# Patient Record
Sex: MALE | Race: WHITE | NOT HISPANIC OR LATINO | Employment: UNEMPLOYED | URBAN - METROPOLITAN AREA
[De-identification: names, ages, dates, MRNs, and addresses within clinical notes are randomized per-mention and may not be internally consistent; named-entity substitution may affect disease eponyms.]

---

## 2017-04-24 RX ORDER — ARMODAFINIL 250 MG/1
TABLET ORAL
Qty: 30 TABLET | Refills: 5 | OUTPATIENT
Start: 2017-04-24 | End: 2017-10-23 | Stop reason: SDUPTHER

## 2017-06-21 ENCOUNTER — OFFICE VISIT (OUTPATIENT)
Dept: NEUROLOGY | Facility: CLINIC | Age: 62
End: 2017-06-21

## 2017-06-21 VITALS
WEIGHT: 210.8 LBS | HEIGHT: 71 IN | BODY MASS INDEX: 29.51 KG/M2 | OXYGEN SATURATION: 98 % | DIASTOLIC BLOOD PRESSURE: 66 MMHG | HEART RATE: 78 BPM | SYSTOLIC BLOOD PRESSURE: 112 MMHG

## 2017-06-21 DIAGNOSIS — F33.40 RECURRENT MAJOR DEPRESSIVE DISORDER, IN REMISSION (HCC): ICD-10-CM

## 2017-06-21 DIAGNOSIS — G20 PARKINSON'S DISEASE (HCC): Primary | ICD-10-CM

## 2017-06-21 DIAGNOSIS — G47.33 OBSTRUCTIVE SLEEP APNEA SYNDROME: ICD-10-CM

## 2017-06-21 PROCEDURE — 99214 OFFICE O/P EST MOD 30 MIN: CPT | Performed by: PSYCHIATRY & NEUROLOGY

## 2017-06-21 RX ORDER — LANOLIN ALCOHOL/MO/W.PET/CERES
1000 CREAM (GRAM) TOPICAL DAILY
COMMUNITY
End: 2023-01-25 | Stop reason: SDUPTHER

## 2017-06-21 RX ORDER — ENTACAPONE 200 MG/1
200 TABLET ORAL
Qty: 150 TABLET | Refills: 11 | Status: SHIPPED | OUTPATIENT
Start: 2017-06-21 | End: 2018-06-21

## 2017-06-21 NOTE — PROGRESS NOTES
Subjective:     Patient ID: Vadim Mckeon is a 61 y.o. male.    History of Present Illness     59 yo male with PD, APOLINAR and MDD returns in follow up.  Last visit on 12/14/16 renewed Nuvigil, Sinemet, Mirapex, Comtan, and Celexa.    PD    Aching pains in legs from knees down.  Wearing off a few hours prior to next dosage.  Taking 6 am, 11 am, 3 pm, 7 pm.  Left leg stiffness and tremors are worsening.  Tremors in extremities left greater than right slightly worsening.  Involuntary blinking of eyes.  Fatigue is worsening.  Tolerating medications without side effects.          APOLINAR    Excessively tired the next day.  CPAP on average 8 hours a night.  Nuvigil improves daytime alertness.  Increased REM behavior disorder    MDD    Mood is stable on Celexa.    The following portions of the patient's history were reviewed and updated as appropriate: allergies, current medications, past medical history, past surgical history and problem list.    Review of Systems   Constitutional: Positive for fatigue. Negative for activity change and unexpected weight change.   HENT: Negative for facial swelling, hearing loss, tinnitus, trouble swallowing and voice change.    Eyes: Negative for photophobia, pain and visual disturbance.   Respiratory: Negative for apnea and choking.    Gastrointestinal: Positive for constipation.   Endocrine: Negative for cold intolerance.   Genitourinary: Negative for difficulty urinating, frequency and urgency.   Musculoskeletal: Positive for gait problem and neck stiffness. Negative for back pain.   Allergic/Immunologic: Negative for immunocompromised state.   Neurological: Positive for tremors and light-headedness. Negative for dizziness, seizures, syncope, facial asymmetry and speech difficulty.   Hematological: Negative for adenopathy.   Psychiatric/Behavioral: Positive for decreased concentration and sleep disturbance. Negative for confusion and hallucinations. The patient is not nervous/anxious.   "       Objective:  Vitals:    06/21/17 1431   BP: 112/66   Pulse: 78   SpO2: 98%   Weight: 210 lb 12.8 oz (95.6 kg)   Height: 71\" (180.3 cm)       Neurologic Exam     Mental Status   Attention: normal. Concentration: normal.   Level of consciousness: alert  Knowledge: good and consistent with education.   Normal comprehension.     Cranial Nerves     CN II   Visual fields full to confrontation.   Visual acuity: normal  Right visual field deficit: none  Left visual field deficit: none     CN III, IV, VI   Nystagmus: none   Diplopia: none  Ophthalmoparesis: none  Upgaze: normal  Downgaze: normal  Conjugate gaze: present    CN V   Facial sensation intact.   Right corneal reflex: normal  Left corneal reflex: normal    CN VII   Right facial weakness: none  Left facial weakness: none    CN VIII   Hearing: intact    CN IX, X   Palate: symmetric  Right gag reflex: normal  Left gag reflex: normal    CN XI   Right sternocleidomastoid strength: normal  Left sternocleidomastoid strength: normal    CN XII   Tongue: not atrophic  Fasciculations: absent  Tongue deviation: none    Motor Exam   Muscle bulk: normal  Overall muscle tone: normal  Right arm tone: normal  Left arm tone: normal and cogwheel rigidity  Right leg tone: normal  Left leg tone: normal    Sensory Exam   Light touch normal.     Gait, Coordination, and Reflexes     Gait  Gait: shuffling    Tremor   Resting tremor: present  Intention tremor: absent  Action tremor: absent    Reflexes   Reflexes 2+ except as noted.       Physical Exam   Constitutional: He appears well-developed and well-nourished.   Nursing note and vitals reviewed.      Assessment/Plan:       Problems Addressed this Visit        Respiratory    Obstructive sleep apnea syndrome     Continue CPAP and Nuvigil            Nervous and Auditory    Parkinson's disease - Primary     Waxing and waning sx on increasing dosages of dopamine replacement    Increase Sinemet and Comtan to 5 times day   With Mirapex " ER 4.5 mg qday         Relevant Medications    carbidopa-levodopa (SINEMET)  MG per tablet    entacapone (COMTAN) 200 MG tablet    Other Relevant Orders    Ambulatory Referral to Neurosurgery       Other    Depression     Psychological condition is unchanged.  Continue current treatment regimen.  Psychological condition  will be reassessed at the next regular appointment.

## 2017-06-21 NOTE — ASSESSMENT & PLAN NOTE
Waxing and waning sx on increasing dosages of dopamine replacement    Increase Sinemet and Comtan to 5 times day   With Mirapex ER 4.5 mg qday

## 2017-06-26 RX ORDER — ERGOCALCIFEROL 1.25 MG/1
CAPSULE ORAL
Qty: 4 CAPSULE | Refills: 12 | Status: SHIPPED | OUTPATIENT
Start: 2017-06-26 | End: 2018-07-09 | Stop reason: SDUPTHER

## 2017-08-09 ENCOUNTER — HOSPITAL ENCOUNTER (OUTPATIENT)
Dept: GENERAL RADIOLOGY | Facility: HOSPITAL | Age: 62
Discharge: HOME | End: 2017-08-09
Admitting: INTERNAL MEDICINE

## 2017-08-09 ENCOUNTER — TRANSCRIBE ORDERS (OUTPATIENT)
Dept: ADMINISTRATIVE | Facility: HOSPITAL | Age: 62
End: 2017-08-09

## 2017-08-09 DIAGNOSIS — I50.9 CONGESTIVE HEART FAILURE, UNSPECIFIED: Primary | ICD-10-CM

## 2017-08-09 PROCEDURE — 71020 HC CHEST PA AND LATERAL: CPT

## 2017-10-23 RX ORDER — ARMODAFINIL 250 MG/1
TABLET ORAL
Qty: 30 TABLET | Refills: 3 | Status: SHIPPED | OUTPATIENT
Start: 2017-10-23 | End: 2018-02-23 | Stop reason: SDUPTHER

## 2017-10-24 ENCOUNTER — HOSPITAL ENCOUNTER (OUTPATIENT)
Dept: GENERAL RADIOLOGY | Age: 62
Discharge: HOME | End: 2017-10-24
Attending: NURSE PRACTITIONER | Admitting: NURSE PRACTITIONER

## 2017-10-24 DIAGNOSIS — R22.0 FACIAL SWELLING: ICD-10-CM

## 2017-12-20 ENCOUNTER — OFFICE VISIT (OUTPATIENT)
Dept: NEUROLOGY | Facility: CLINIC | Age: 62
End: 2017-12-20

## 2017-12-20 VITALS
HEART RATE: 72 BPM | BODY MASS INDEX: 30.49 KG/M2 | RESPIRATION RATE: 18 BRPM | WEIGHT: 213 LBS | HEIGHT: 70 IN | OXYGEN SATURATION: 98 % | SYSTOLIC BLOOD PRESSURE: 106 MMHG | DIASTOLIC BLOOD PRESSURE: 58 MMHG

## 2017-12-20 DIAGNOSIS — G20 PARKINSON'S DISEASE (HCC): Primary | ICD-10-CM

## 2017-12-20 DIAGNOSIS — F33.40 RECURRENT MAJOR DEPRESSIVE DISORDER, IN REMISSION (HCC): ICD-10-CM

## 2017-12-20 DIAGNOSIS — G47.33 OBSTRUCTIVE SLEEP APNEA SYNDROME: ICD-10-CM

## 2017-12-20 PROCEDURE — 99214 OFFICE O/P EST MOD 30 MIN: CPT | Performed by: PSYCHIATRY & NEUROLOGY

## 2017-12-20 NOTE — PROGRESS NOTES
Subjective:   Chief Complaint   Patient presents with   • Parkinson's Disease       Patient ID: Vadim Mckeon is a 62 y.o. male.    History of Present Illness     62 y.o.  male with PD, APOLINAR and MDD returns in follow up.  Last visit on 6/21/17 renewed Nuvigil, Sinemet, Mirapex, Comtan, and Celexa.    PD    Leg pain improved after surgery for varicose veins.  Going through evaluation for DBS.  Taking Stalevo po 6 times a day.  Fatigue has slightly worsened.  Changed to Stalevo by  neurology.     APOLINAR    Recent sleep study and had CPAP pressure increased.  Improved daytime alertness.    CPAP on average 8 hours a night.  Nuvigil improves daytime alertness.  Continued REM behavior disorder    MDD    Mood is stable on Celexa.    The following portions of the patient's history were reviewed and updated as appropriate: allergies, current medications, past medical history, past surgical history and problem list.    Review of Systems   Constitutional: Positive for fatigue. Negative for activity change and unexpected weight change.   HENT: Negative for facial swelling, hearing loss, tinnitus, trouble swallowing and voice change.    Eyes: Negative for photophobia, pain and visual disturbance.   Respiratory: Negative for apnea and choking.    Gastrointestinal: Positive for constipation.   Endocrine: Negative for cold intolerance.   Genitourinary: Negative for difficulty urinating, frequency and urgency.   Musculoskeletal: Positive for gait problem and neck stiffness. Negative for back pain.   Allergic/Immunologic: Negative for immunocompromised state.   Neurological: Positive for tremors and light-headedness. Negative for dizziness, seizures, syncope, facial asymmetry and speech difficulty.   Hematological: Negative for adenopathy.   Psychiatric/Behavioral: Positive for decreased concentration and sleep disturbance. Negative for confusion and hallucinations. The patient is not nervous/anxious.         Objective:  Vitals:  "   12/20/17 1417   BP: 106/58   BP Location: Right arm   Patient Position: Sitting   Cuff Size: Adult   Pulse: 72   Resp: 18   SpO2: 98%   Weight: 96.6 kg (213 lb)   Height: 177.8 cm (70\")       Neurologic Exam     Mental Status   Attention: normal. Concentration: normal.   Level of consciousness: alert  Knowledge: good and consistent with education.   Normal comprehension.     Cranial Nerves     CN II   Visual fields full to confrontation.   Visual acuity: normal  Right visual field deficit: none  Left visual field deficit: none     CN III, IV, VI   Nystagmus: none   Diplopia: none  Ophthalmoparesis: none  Upgaze: normal  Downgaze: normal  Conjugate gaze: present    CN V   Facial sensation intact.   Right corneal reflex: normal  Left corneal reflex: normal    CN VII   Right facial weakness: none  Left facial weakness: none    CN VIII   Hearing: intact    CN IX, X   Palate: symmetric  Right gag reflex: normal  Left gag reflex: normal    CN XI   Right sternocleidomastoid strength: normal  Left sternocleidomastoid strength: normal    CN XII   Tongue: not atrophic  Fasciculations: absent  Tongue deviation: none    Motor Exam   Muscle bulk: normal  Overall muscle tone: normal  Right arm tone: normal  Left arm tone: normal and cogwheel rigidity  Right leg tone: normal  Left leg tone: normal    Sensory Exam   Light touch normal.     Gait, Coordination, and Reflexes     Gait  Gait: shuffling    Tremor   Resting tremor: present  Intention tremor: absent  Action tremor: absent    Reflexes   Reflexes 2+ except as noted.       Physical Exam   Constitutional: He appears well-developed and well-nourished.   Nursing note and vitals reviewed.      Assessment/Plan:       Problems Addressed this Visit        Respiratory    Obstructive sleep apnea syndrome     Continue CPAP and Nuvigil             Nervous and Auditory    Parkinson's disease - Primary     Undergoing evaluation for DBS    Will follow up after implantation             " Other    Depression     Psychological condition is unchanged.  Continue current treatment regimen.  Psychological condition  will be reassessed at the next regular appointment.

## 2017-12-26 RX ORDER — PRAMIPEXOLE DIHYDROCHLORIDE 4.5 MG/1
TABLET, EXTENDED RELEASE ORAL
Qty: 30 TABLET | Refills: 11 | Status: SHIPPED | OUTPATIENT
Start: 2017-12-26 | End: 2022-04-27

## 2018-01-08 RX ORDER — CITALOPRAM 20 MG/1
TABLET ORAL
Qty: 30 TABLET | Refills: 11 | Status: SHIPPED | OUTPATIENT
Start: 2018-01-08 | End: 2022-04-27

## 2018-02-23 RX ORDER — ARMODAFINIL 250 MG/1
250 TABLET ORAL DAILY
Qty: 30 TABLET | Refills: 3 | Status: SHIPPED | OUTPATIENT
Start: 2018-02-23 | End: 2018-05-29 | Stop reason: SDUPTHER

## 2018-02-23 RX ORDER — ARMODAFINIL 250 MG/1
TABLET ORAL
Qty: 30 TABLET | Refills: 3 | Status: SHIPPED | OUTPATIENT
Start: 2018-02-23 | End: 2018-02-23 | Stop reason: SDUPTHER

## 2018-03-01 RX ORDER — ARMODAFINIL 250 MG/1
TABLET ORAL
Qty: 30 TABLET | Refills: 3 | OUTPATIENT
Start: 2018-03-01

## 2018-05-29 RX ORDER — ARMODAFINIL 250 MG/1
250 TABLET ORAL DAILY
Qty: 30 TABLET | Refills: 3 | OUTPATIENT
Start: 2018-05-29

## 2018-05-29 NOTE — TELEPHONE ENCOUNTER
RECEIVED A FAX REQUESTING A MED REFILL FOR THE PATIENT. PLEASE SIGN THE SCRIPT SO I CAN CALL IN THE MEDICATION FOR THE PATIENT. THANKS!!

## 2018-07-09 RX ORDER — ERGOCALCIFEROL 1.25 MG/1
CAPSULE ORAL
Qty: 4 CAPSULE | Refills: 12 | Status: SHIPPED | OUTPATIENT
Start: 2018-07-09

## 2018-12-04 ENCOUNTER — HOSPITAL ENCOUNTER (OUTPATIENT)
Facility: HOSPITAL | Age: 63
Discharge: HOME | End: 2018-12-04
Payer: MEDICARE

## 2018-12-04 PROCEDURE — 80053 COMPREHEN METABOLIC PANEL: CPT

## 2018-12-04 PROCEDURE — 82607 VITAMIN B-12: CPT

## 2018-12-04 PROCEDURE — 84153 ASSAY OF PSA TOTAL: CPT

## 2018-12-04 PROCEDURE — 80061 LIPID PANEL: CPT

## 2018-12-04 PROCEDURE — 85025 COMPLETE CBC W/AUTO DIFF WBC: CPT

## 2018-12-04 PROCEDURE — 82746 ASSAY OF FOLIC ACID SERUM: CPT

## 2018-12-04 PROCEDURE — 83036 HEMOGLOBIN GLYCOSYLATED A1C: CPT

## 2018-12-04 PROCEDURE — 36415 COLL VENOUS BLD VENIPUNCTURE: CPT

## 2018-12-05 LAB
A/G RATIO: 2 (ref 0.8–2)
ALBUMIN SERPL-MCNC: 4.5 G/DL (ref 3.4–4.8)
ALP BLD-CCNC: 72 U/L (ref 25–100)
ALT SERPL-CCNC: 9 U/L (ref 4–36)
ANION GAP SERPL CALCULATED.3IONS-SCNC: 11 MMOL/L (ref 3–16)
AST SERPL-CCNC: 27 U/L (ref 8–33)
BASOPHILS ABSOLUTE: 0.1 K/UL (ref 0–0.1)
BASOPHILS RELATIVE PERCENT: 0.8 %
BILIRUB SERPL-MCNC: 0.4 MG/DL (ref 0.3–1.2)
BUN BLDV-MCNC: 20 MG/DL (ref 6–20)
CALCIUM SERPL-MCNC: 9.8 MG/DL (ref 8.5–10.5)
CHLORIDE BLD-SCNC: 98 MMOL/L (ref 98–107)
CHOLESTEROL, TOTAL: 151 MG/DL (ref 0–200)
CO2: 27 MMOL/L (ref 20–30)
CREAT SERPL-MCNC: 1.2 MG/DL (ref 0.4–1.2)
EOSINOPHILS ABSOLUTE: 0.1 K/UL (ref 0–0.4)
EOSINOPHILS RELATIVE PERCENT: 1.3 %
FOLATE: 8.97 NG/ML
GFR AFRICAN AMERICAN: >59
GFR NON-AFRICAN AMERICAN: >59
GLOBULIN: 2.3 G/DL
GLUCOSE BLD-MCNC: 302 MG/DL (ref 74–106)
HBA1C MFR BLD: 10.1 %
HCT VFR BLD CALC: 40.4 % (ref 40–54)
HDLC SERPL-MCNC: 35 MG/DL (ref 40–60)
HEMOGLOBIN: 13.2 G/DL (ref 13–18)
IMMATURE GRANULOCYTES #: 0 K/UL
IMMATURE GRANULOCYTES %: 0.3 % (ref 0–5)
LDL CHOLESTEROL CALCULATED: 56 MG/DL
LYMPHOCYTES ABSOLUTE: 1.5 K/UL (ref 1.5–4)
LYMPHOCYTES RELATIVE PERCENT: 21.5 %
MCH RBC QN AUTO: 30.2 PG (ref 27–32)
MCHC RBC AUTO-ENTMCNC: 32.7 G/DL (ref 31–35)
MCV RBC AUTO: 92.4 FL (ref 80–100)
MONOCYTES ABSOLUTE: 0.5 K/UL (ref 0.2–0.8)
MONOCYTES RELATIVE PERCENT: 7.4 %
NEUTROPHILS ABSOLUTE: 4.9 K/UL (ref 2–7.5)
NEUTROPHILS RELATIVE PERCENT: 68.7 %
PDW BLD-RTO: 13.4 % (ref 11–16)
PLATELET # BLD: 166 K/UL (ref 150–400)
PMV BLD AUTO: 11.1 FL (ref 6–10)
POTASSIUM SERPL-SCNC: 4.2 MMOL/L (ref 3.4–5.1)
PROSTATE SPECIFIC ANTIGEN: 1.44 NG/ML (ref 0–4)
RBC # BLD: 4.37 M/UL (ref 4.5–6)
SODIUM BLD-SCNC: 136 MMOL/L (ref 136–145)
TOTAL PROTEIN: 6.8 G/DL (ref 6.4–8.3)
TRIGL SERPL-MCNC: 301 MG/DL (ref 0–249)
VITAMIN B-12: 935 PG/ML (ref 211–911)
VLDLC SERPL CALC-MCNC: 60 MG/DL
WBC # BLD: 7.1 K/UL (ref 4–11)

## 2019-02-11 ENCOUNTER — APPOINTMENT (OUTPATIENT)
Dept: GENERAL RADIOLOGY | Facility: HOSPITAL | Age: 64
End: 2019-02-11
Payer: MEDICARE

## 2019-02-11 ENCOUNTER — HOSPITAL ENCOUNTER (EMERGENCY)
Facility: HOSPITAL | Age: 64
Discharge: HOME | End: 2019-02-11
Attending: EMERGENCY MEDICINE
Payer: MEDICARE

## 2019-02-11 VITALS
WEIGHT: 212.13 LBS | HEART RATE: 70 BPM | SYSTOLIC BLOOD PRESSURE: 137 MMHG | BODY MASS INDEX: 30.37 KG/M2 | HEIGHT: 70 IN | OXYGEN SATURATION: 97 % | DIASTOLIC BLOOD PRESSURE: 76 MMHG | TEMPERATURE: 98.1 F | RESPIRATION RATE: 15 BRPM

## 2019-02-11 DIAGNOSIS — R53.81 MALAISE: ICD-10-CM

## 2019-02-11 DIAGNOSIS — R11.11 NON-INTRACTABLE VOMITING WITHOUT NAUSEA, UNSPECIFIED VOMITING TYPE: Primary | ICD-10-CM

## 2019-02-11 LAB
A/G RATIO: 1.7 (ref 0.8–2)
ALBUMIN SERPL-MCNC: 4.7 G/DL (ref 3.4–4.8)
ALP BLD-CCNC: 75 U/L (ref 25–100)
ALT SERPL-CCNC: 40 U/L (ref 4–36)
ANION GAP SERPL CALCULATED.3IONS-SCNC: 11 MMOL/L (ref 3–16)
AST SERPL-CCNC: 28 U/L (ref 8–33)
BASOPHILS ABSOLUTE: 0 K/UL (ref 0–0.1)
BASOPHILS RELATIVE PERCENT: 0.4 %
BILIRUB SERPL-MCNC: 0.6 MG/DL (ref 0.3–1.2)
BILIRUBIN URINE: NEGATIVE
BLOOD, URINE: NEGATIVE
BUN BLDV-MCNC: 19 MG/DL (ref 6–20)
CALCIUM SERPL-MCNC: 9.7 MG/DL (ref 8.5–10.5)
CHLORIDE BLD-SCNC: 97 MMOL/L (ref 98–107)
CLARITY: CLEAR
CO2: 28 MMOL/L (ref 20–30)
COLOR: YELLOW
CREAT SERPL-MCNC: 1.2 MG/DL (ref 0.4–1.2)
EOSINOPHILS ABSOLUTE: 0.1 K/UL (ref 0–0.4)
EOSINOPHILS RELATIVE PERCENT: 1 %
GFR AFRICAN AMERICAN: >59
GFR NON-AFRICAN AMERICAN: >59
GLOBULIN: 2.8 G/DL
GLUCOSE BLD-MCNC: 215 MG/DL (ref 74–106)
GLUCOSE URINE: 500 MG/DL
HCT VFR BLD CALC: 42.6 % (ref 40–54)
HEMOGLOBIN: 14.2 G/DL (ref 13–18)
IMMATURE GRANULOCYTES #: 0 K/UL
IMMATURE GRANULOCYTES %: 0.3 % (ref 0–5)
KETONES, URINE: NEGATIVE MG/DL
LACTIC ACID: 1.8 MMOL/L (ref 0.4–2)
LEUKOCYTE ESTERASE, URINE: NEGATIVE
LIPASE: 41 U/L (ref 5.6–51.3)
LYMPHOCYTES ABSOLUTE: 2 K/UL (ref 1.5–4)
LYMPHOCYTES RELATIVE PERCENT: 20.9 %
MCH RBC QN AUTO: 29.8 PG (ref 27–32)
MCHC RBC AUTO-ENTMCNC: 33.3 G/DL (ref 31–35)
MCV RBC AUTO: 89.5 FL (ref 80–100)
MICROSCOPIC EXAMINATION: ABNORMAL
MONOCYTES ABSOLUTE: 0.7 K/UL (ref 0.2–0.8)
MONOCYTES RELATIVE PERCENT: 7.4 %
NEUTROPHILS ABSOLUTE: 6.6 K/UL (ref 2–7.5)
NEUTROPHILS RELATIVE PERCENT: 70 %
NITRITE, URINE: NEGATIVE
PDW BLD-RTO: 13.6 % (ref 11–16)
PH UA: 6.5
PLATELET # BLD: 158 K/UL (ref 150–400)
PMV BLD AUTO: 11.1 FL (ref 6–10)
POTASSIUM SERPL-SCNC: 4.2 MMOL/L (ref 3.4–5.1)
PRO-BNP: 162 PG/ML (ref 0–1800)
PROTEIN UA: NEGATIVE MG/DL
RBC # BLD: 4.76 M/UL (ref 4.5–6)
SODIUM BLD-SCNC: 136 MMOL/L (ref 136–145)
SPECIFIC GRAVITY UA: 1.02
TOTAL PROTEIN: 7.5 G/DL (ref 6.4–8.3)
TROPONIN: <0.3 NG/ML
URINE REFLEX TO CULTURE: ABNORMAL
URINE TYPE: ABNORMAL
UROBILINOGEN, URINE: 1 E.U./DL
WBC # BLD: 9.4 K/UL (ref 4–11)

## 2019-02-11 PROCEDURE — 83880 ASSAY OF NATRIURETIC PEPTIDE: CPT

## 2019-02-11 PROCEDURE — 99284 EMERGENCY DEPT VISIT MOD MDM: CPT

## 2019-02-11 PROCEDURE — 85025 COMPLETE CBC W/AUTO DIFF WBC: CPT

## 2019-02-11 PROCEDURE — 83605 ASSAY OF LACTIC ACID: CPT

## 2019-02-11 PROCEDURE — 83690 ASSAY OF LIPASE: CPT

## 2019-02-11 PROCEDURE — 84484 ASSAY OF TROPONIN QUANT: CPT

## 2019-02-11 PROCEDURE — 96361 HYDRATE IV INFUSION ADD-ON: CPT

## 2019-02-11 PROCEDURE — 36415 COLL VENOUS BLD VENIPUNCTURE: CPT

## 2019-02-11 PROCEDURE — 80053 COMPREHEN METABOLIC PANEL: CPT

## 2019-02-11 PROCEDURE — 96374 THER/PROPH/DIAG INJ IV PUSH: CPT

## 2019-02-11 PROCEDURE — 2580000003 HC RX 258: Performed by: EMERGENCY MEDICINE

## 2019-02-11 PROCEDURE — 93005 ELECTROCARDIOGRAM TRACING: CPT

## 2019-02-11 PROCEDURE — 81003 URINALYSIS AUTO W/O SCOPE: CPT

## 2019-02-11 PROCEDURE — 6360000002 HC RX W HCPCS: Performed by: EMERGENCY MEDICINE

## 2019-02-11 PROCEDURE — 71045 X-RAY EXAM CHEST 1 VIEW: CPT

## 2019-02-11 RX ORDER — CITALOPRAM 20 MG/1
20 TABLET ORAL DAILY
COMMUNITY

## 2019-02-11 RX ORDER — PRAMIPEXOLE 4.5 MG/1
TABLET, EXTENDED RELEASE ORAL DAILY
COMMUNITY

## 2019-02-11 RX ORDER — DONEPEZIL HYDROCHLORIDE 10 MG/1
10 TABLET, FILM COATED ORAL NIGHTLY
COMMUNITY

## 2019-02-11 RX ORDER — GLIMEPIRIDE 4 MG/1
4 TABLET ORAL 2 TIMES DAILY
COMMUNITY

## 2019-02-11 RX ORDER — RANITIDINE 150 MG/1
150 TABLET ORAL 2 TIMES DAILY
COMMUNITY

## 2019-02-11 RX ORDER — 0.9 % SODIUM CHLORIDE 0.9 %
1000 INTRAVENOUS SOLUTION INTRAVENOUS ONCE
Status: COMPLETED | OUTPATIENT
Start: 2019-02-11 | End: 2019-02-11

## 2019-02-11 RX ORDER — LISINOPRIL 20 MG/1
20 TABLET ORAL DAILY
COMMUNITY

## 2019-02-11 RX ORDER — ROSUVASTATIN CALCIUM 20 MG/1
20 TABLET, COATED ORAL DAILY
COMMUNITY

## 2019-02-11 RX ORDER — ONDANSETRON 2 MG/ML
4 INJECTION INTRAMUSCULAR; INTRAVENOUS ONCE
Status: COMPLETED | OUTPATIENT
Start: 2019-02-11 | End: 2019-02-11

## 2019-02-11 RX ORDER — ONDANSETRON 4 MG/1
4 TABLET, ORALLY DISINTEGRATING ORAL EVERY 8 HOURS PRN
Qty: 12 TABLET | Refills: 0 | Status: SHIPPED | OUTPATIENT
Start: 2019-02-11

## 2019-02-11 RX ADMIN — ONDANSETRON 4 MG: 2 INJECTION INTRAMUSCULAR; INTRAVENOUS at 19:46

## 2019-02-11 RX ADMIN — SODIUM CHLORIDE 1000 ML: 9 INJECTION, SOLUTION INTRAVENOUS at 19:42

## 2019-10-09 ENCOUNTER — HOSPITAL ENCOUNTER (OUTPATIENT)
Facility: HOSPITAL | Age: 64
Discharge: HOME | End: 2019-10-09
Payer: MEDICARE

## 2019-10-09 PROCEDURE — 82306 VITAMIN D 25 HYDROXY: CPT

## 2019-10-09 PROCEDURE — 82607 VITAMIN B-12: CPT

## 2019-10-09 PROCEDURE — 80053 COMPREHEN METABOLIC PANEL: CPT

## 2019-10-09 PROCEDURE — 36415 COLL VENOUS BLD VENIPUNCTURE: CPT

## 2019-10-09 PROCEDURE — 82746 ASSAY OF FOLIC ACID SERUM: CPT

## 2019-10-09 PROCEDURE — 85025 COMPLETE CBC W/AUTO DIFF WBC: CPT

## 2019-10-09 PROCEDURE — 80061 LIPID PANEL: CPT

## 2019-10-09 PROCEDURE — 84443 ASSAY THYROID STIM HORMONE: CPT

## 2019-10-09 PROCEDURE — 83036 HEMOGLOBIN GLYCOSYLATED A1C: CPT

## 2019-10-10 LAB
A/G RATIO: 1.7 (ref 0.8–2)
ALBUMIN SERPL-MCNC: 4.3 G/DL (ref 3.4–4.8)
ALP BLD-CCNC: 74 U/L (ref 25–100)
ALT SERPL-CCNC: 31 U/L (ref 4–36)
ANION GAP SERPL CALCULATED.3IONS-SCNC: 14 MMOL/L (ref 3–16)
AST SERPL-CCNC: 18 U/L (ref 8–33)
BASOPHILS ABSOLUTE: 0 K/UL (ref 0–0.1)
BASOPHILS RELATIVE PERCENT: 0.5 %
BILIRUB SERPL-MCNC: 0.4 MG/DL (ref 0.3–1.2)
BUN BLDV-MCNC: 22 MG/DL (ref 6–20)
CALCIUM SERPL-MCNC: 9.3 MG/DL (ref 8.5–10.5)
CHLORIDE BLD-SCNC: 98 MMOL/L (ref 98–107)
CHOLESTEROL, TOTAL: 87 MG/DL (ref 0–200)
CO2: 27 MMOL/L (ref 20–30)
CREAT SERPL-MCNC: 1.2 MG/DL (ref 0.4–1.2)
EOSINOPHILS ABSOLUTE: 0.1 K/UL (ref 0–0.4)
EOSINOPHILS RELATIVE PERCENT: 1.1 %
FOLATE: 9.49 NG/ML
GFR AFRICAN AMERICAN: >59
GFR NON-AFRICAN AMERICAN: >59
GLOBULIN: 2.5 G/DL
GLUCOSE BLD-MCNC: 158 MG/DL (ref 74–106)
HBA1C MFR BLD: 8 %
HCT VFR BLD CALC: 38.4 % (ref 40–54)
HDLC SERPL-MCNC: 30 MG/DL (ref 40–60)
HEMOGLOBIN: 12.9 G/DL (ref 13–18)
IMMATURE GRANULOCYTES #: 0 K/UL
IMMATURE GRANULOCYTES %: 0.4 % (ref 0–5)
LDL CHOLESTEROL CALCULATED: 18 MG/DL
LYMPHOCYTES ABSOLUTE: 1.8 K/UL (ref 1.5–4)
LYMPHOCYTES RELATIVE PERCENT: 21.9 %
MCH RBC QN AUTO: 30.8 PG (ref 27–32)
MCHC RBC AUTO-ENTMCNC: 33.6 G/DL (ref 31–35)
MCV RBC AUTO: 91.6 FL (ref 80–100)
MONOCYTES ABSOLUTE: 0.6 K/UL (ref 0.2–0.8)
MONOCYTES RELATIVE PERCENT: 7.4 %
NEUTROPHILS ABSOLUTE: 5.6 K/UL (ref 2–7.5)
NEUTROPHILS RELATIVE PERCENT: 68.7 %
PDW BLD-RTO: 13.2 % (ref 11–16)
PLATELET # BLD: 155 K/UL (ref 150–400)
PMV BLD AUTO: 11.7 FL (ref 6–10)
POTASSIUM SERPL-SCNC: 4.3 MMOL/L (ref 3.4–5.1)
RBC # BLD: 4.19 M/UL (ref 4.5–6)
SODIUM BLD-SCNC: 139 MMOL/L (ref 136–145)
TOTAL PROTEIN: 6.8 G/DL (ref 6.4–8.3)
TRIGL SERPL-MCNC: 197 MG/DL (ref 0–249)
TSH SERPL DL<=0.05 MIU/L-ACNC: 1.07 UIU/ML (ref 0.35–5.5)
VITAMIN B-12: 393 PG/ML (ref 211–911)
VITAMIN D 25-HYDROXY: 42.9 (ref 32–100)
VLDLC SERPL CALC-MCNC: 39 MG/DL
WBC # BLD: 8.1 K/UL (ref 4–11)

## 2020-06-15 ENCOUNTER — HOSPITAL ENCOUNTER (OUTPATIENT)
Facility: HOSPITAL | Age: 65
Discharge: HOME | End: 2020-06-15
Payer: MEDICARE

## 2020-06-15 LAB
A/G RATIO: 1.8 (ref 0.8–2)
ALBUMIN SERPL-MCNC: 4.6 G/DL (ref 3.4–4.8)
ALP BLD-CCNC: 81 U/L (ref 25–100)
ALT SERPL-CCNC: 6 U/L (ref 4–36)
ANION GAP SERPL CALCULATED.3IONS-SCNC: 14 MMOL/L (ref 3–16)
AST SERPL-CCNC: 21 U/L (ref 8–33)
BASOPHILS ABSOLUTE: 0 K/UL (ref 0–0.1)
BASOPHILS RELATIVE PERCENT: 0.5 %
BILIRUB SERPL-MCNC: 0.7 MG/DL (ref 0.3–1.2)
BUN BLDV-MCNC: 18 MG/DL (ref 6–20)
CALCIUM SERPL-MCNC: 9.5 MG/DL (ref 8.5–10.5)
CHLORIDE BLD-SCNC: 99 MMOL/L (ref 98–107)
CHOLESTEROL, TOTAL: 99 MG/DL (ref 0–200)
CO2: 25 MMOL/L (ref 20–30)
CREAT SERPL-MCNC: 1.1 MG/DL (ref 0.4–1.2)
EOSINOPHILS ABSOLUTE: 0.1 K/UL (ref 0–0.4)
EOSINOPHILS RELATIVE PERCENT: 0.8 %
FOLATE: 9.48 NG/ML
GFR AFRICAN AMERICAN: >59
GFR NON-AFRICAN AMERICAN: >59
GLOBULIN: 2.6 G/DL
GLUCOSE BLD-MCNC: 162 MG/DL (ref 74–106)
HBA1C MFR BLD: 7.3 %
HCT VFR BLD CALC: 39.2 % (ref 40–54)
HDLC SERPL-MCNC: 30 MG/DL (ref 40–60)
HEMOGLOBIN: 13.3 G/DL (ref 13–18)
IMMATURE GRANULOCYTES #: 0 K/UL
IMMATURE GRANULOCYTES %: 0.5 % (ref 0–5)
LDL CHOLESTEROL CALCULATED: 34 MG/DL
LYMPHOCYTES ABSOLUTE: 1.2 K/UL (ref 1.5–4)
LYMPHOCYTES RELATIVE PERCENT: 19.6 %
MCH RBC QN AUTO: 31.2 PG (ref 27–32)
MCHC RBC AUTO-ENTMCNC: 33.9 G/DL (ref 31–35)
MCV RBC AUTO: 92 FL (ref 80–100)
MONOCYTES ABSOLUTE: 0.4 K/UL (ref 0.2–0.8)
MONOCYTES RELATIVE PERCENT: 7 %
NEUTROPHILS ABSOLUTE: 4.4 K/UL (ref 2–7.5)
NEUTROPHILS RELATIVE PERCENT: 71.6 %
PDW BLD-RTO: 13.2 % (ref 11–16)
PLATELET # BLD: 141 K/UL (ref 150–400)
PMV BLD AUTO: 11.1 FL (ref 6–10)
POTASSIUM SERPL-SCNC: 4.2 MMOL/L (ref 3.4–5.1)
RBC # BLD: 4.26 M/UL (ref 4.5–6)
SODIUM BLD-SCNC: 138 MMOL/L (ref 136–145)
TOTAL PROTEIN: 7.2 G/DL (ref 6.4–8.3)
TRIGL SERPL-MCNC: 177 MG/DL (ref 0–249)
TSH SERPL DL<=0.05 MIU/L-ACNC: 2.08 UIU/ML (ref 0.35–5.5)
VITAMIN B-12: 320 PG/ML (ref 211–911)
VITAMIN D 25-HYDROXY: 44.1 (ref 32–100)
VLDLC SERPL CALC-MCNC: 35 MG/DL
WBC # BLD: 6.1 K/UL (ref 4–11)

## 2020-06-15 PROCEDURE — 82607 VITAMIN B-12: CPT

## 2020-06-15 PROCEDURE — 36415 COLL VENOUS BLD VENIPUNCTURE: CPT

## 2020-06-15 PROCEDURE — 84443 ASSAY THYROID STIM HORMONE: CPT

## 2020-06-15 PROCEDURE — 80061 LIPID PANEL: CPT

## 2020-06-15 PROCEDURE — 82746 ASSAY OF FOLIC ACID SERUM: CPT

## 2020-06-15 PROCEDURE — 80053 COMPREHEN METABOLIC PANEL: CPT

## 2020-06-15 PROCEDURE — 83036 HEMOGLOBIN GLYCOSYLATED A1C: CPT

## 2020-06-15 PROCEDURE — 82306 VITAMIN D 25 HYDROXY: CPT

## 2020-06-15 PROCEDURE — 85025 COMPLETE CBC W/AUTO DIFF WBC: CPT

## 2020-08-31 ENCOUNTER — HOSPITAL ENCOUNTER (OUTPATIENT)
Facility: HOSPITAL | Age: 65
Discharge: HOME | End: 2020-08-31
Payer: MEDICARE

## 2020-08-31 ENCOUNTER — HOSPITAL ENCOUNTER (OUTPATIENT)
Dept: ULTRASOUND IMAGING | Facility: HOSPITAL | Age: 65
Discharge: HOME | End: 2020-08-31
Payer: MEDICARE

## 2020-08-31 PROCEDURE — 87177 OVA AND PARASITES SMEARS: CPT

## 2020-08-31 PROCEDURE — 87506 IADNA-DNA/RNA PROBE TQ 6-11: CPT

## 2020-08-31 PROCEDURE — 76705 ECHO EXAM OF ABDOMEN: CPT

## 2020-08-31 PROCEDURE — 87209 SMEAR COMPLEX STAIN: CPT

## 2020-09-04 LAB — INTERPRETATION: NEGATIVE

## 2020-09-10 LAB
CAMPYLOBACTER JEJUNI/COLI PCR: NOT DETECTED
CAMPYLOBACTER UPSALIENSIS: NOT DETECTED
E COLI SHIGELLA/ENTEROINVASIVE PCR: NOT DETECTED
SALMONELLA PCR: NOT DETECTED
SHIGA TOXIN I: NOT DETECTED
SHIGA TOXIN II: NOT DETECTED

## 2020-09-28 NOTE — PROGRESS NOTES
Patient: Vadim Mckeon    YOB: 1955    Date: 09/30/2020    Primary Care Provider: Zuleyma Blackburn APRN    Chief Complaint   Patient presents with   • Consult   • Diarrhea       SUBJECTIVE:    History of present illness:  I saw the patient in the office today as a consultation for evaluation and treatment of diarrhea. States this has been going on x1 month ago.  Patient had stool studies that were negative for infection.  No rectal bleeding or anemia.  Last colonoscopy over 10 years ago.  No history of polyps and no rectal bleeding.  No weight loss or nausea or vomiting.  No family history of Crohn's disease or ulcerative colitis.  Gallbladder ultrasound was negative for stones.    The following portions of the patient's history were reviewed and updated as appropriate: allergies, current medications, past family history, past medical history, past social history, past surgical history and problem list.    Review of Systems   Constitutional: Negative for chills, fever and unexpected weight change.   HENT: Negative for trouble swallowing and voice change.    Eyes: Negative for visual disturbance.   Respiratory: Negative for apnea, cough, chest tightness, shortness of breath and wheezing.    Cardiovascular: Negative for chest pain, palpitations and leg swelling.   Gastrointestinal: Positive for abdominal pain and diarrhea. Negative for abdominal distention, anal bleeding, blood in stool, constipation, nausea, rectal pain and vomiting.   Endocrine: Negative for cold intolerance and heat intolerance.   Genitourinary: Negative for difficulty urinating, dysuria, flank pain, scrotal swelling and testicular pain.   Musculoskeletal: Negative for back pain, gait problem and joint swelling.   Skin: Negative for color change, rash and wound.   Neurological: Negative for dizziness, syncope, speech difficulty, weakness, numbness and headaches.   Hematological: Negative for adenopathy. Does not bruise/bleed  easily.   Psychiatric/Behavioral: Negative for confusion. The patient is not nervous/anxious.        History:  Past Medical History:   Diagnosis Date   • Movement disorder    • APOLINAR (obstructive sleep apnea)        Past Surgical History:   Procedure Laterality Date   • KNEE ARTHROSCOPY W/ MENISCAL REPAIR         Family History   Problem Relation Age of Onset   • Diabetes Other    • Hypertension Other    • Other Other         myocardial infarction       Social History     Tobacco Use   • Smoking status: Never Smoker   • Smokeless tobacco: Never Used   Substance Use Topics   • Alcohol use: Yes     Comment: 2 drinks/month   • Drug use: No       Allergies:  No Known Allergies    Medications:    Current Outpatient Medications:   •  Armodafinil 250 MG tablet, Take 1 tablet by mouth Daily., Disp: 30 tablet, Rfl: 3  •  carbidopa-levodopa (SINEMET)  MG per tablet, , Disp: , Rfl:   •  citalopram (CeleXA) 20 MG tablet, Take 1 tablet by mouth Daily., Disp: 30 tablet, Rfl: 11  •  clonazePAM (KlonoPIN) 1 MG tablet, , Disp: , Rfl:   •  donepezil (ARICEPT) 10 MG tablet, Take 10 mg by mouth., Disp: , Rfl:   •  glimepiride (AMARYL) 4 MG tablet, Take 4 mg by mouth., Disp: , Rfl:   •  glucose blood (ACCU-CHEK JAMEY PLUS) test strip, Check blood sugar X 2/day - 3 days/week, Disp: 25 each, Rfl: 12  •  Insulin Glargine, 1 Unit Dial, (TOUJEO) 300 UNIT/ML solution pen-injector injection, Inject  under the skin into the appropriate area as directed., Disp: , Rfl:   •  lisinopril (PRINIVIL,ZESTRIL) 20 MG tablet, Take 20 mg by mouth., Disp: , Rfl:   •  lisinopril-hydrochlorothiazide (PRINZIDE,ZESTORETIC) 20-25 MG per tablet, Take 1 tablet by mouth daily., Disp: , Rfl:   •  metFORMIN (GLUCOPHAGE) 500 MG tablet, Take 500 mg by mouth 2 (Two) Times a Day With Meals., Disp: , Rfl:   •  MIRAPEX ER 4.5 MG tablet sustained-release 24 hour, TAKE 1 TABLET BY MOUTH ONCE DAILY, Disp: 30 tablet, Rfl: 11  •  Nuedexta 20-10 MG capsule capsule, , Disp: ,  "Rfl:   •  omeprazole (priLOSEC) 40 MG capsule, , Disp: , Rfl:   •  rosuvastatin (CRESTOR) 20 MG tablet, Take 20 mg by mouth., Disp: , Rfl:   •  TRUEplus Pen Needles 31G X 6 MM misc, USE ONE EVERY DAY, Disp: , Rfl:   •  bisacodyl (bisacodyl) 5 MG EC tablet, Take 2 at 3pm and 2 at 7pm the day prior to colonoscopy., Disp: 4 tablet, Rfl: 0  •  Omega-3 Fatty Acids (FISH OIL TRIPLE STRENGTH) 1400 MG capsule, Take 1 capsule by mouth daily., Disp: , Rfl:   •  polyethylene glycol (MIRALAX) 17 GM/SCOOP powder, Mix 238g powder with 64 oz of clear liquid. Starting at 5pm drink 80z every 10-15 minutes until consumed., Disp: 238 g, Rfl: 0  •  ranitidine (ZANTAC) 150 MG tablet, Take 150 mg by mouth 2 (two) times a day., Disp: , Rfl:   •  triamcinolone (KENALOG) 0.5 % cream, apply TO TO RASH AREA TWO TO THREE times daily, Disp: , Rfl:   •  vitamin B-12 (CYANOCOBALAMIN) 1000 MCG tablet, Take 1,000 mcg by mouth Daily., Disp: , Rfl:   •  vitamin D (ERGOCALCIFEROL) 43523 units capsule capsule, TAKE 1 CAPSULE BY MOUTH ONCE WEEKLY, Disp: 4 capsule, Rfl: 12  •  VITAMIN D, CHOLECALCIFEROL, PO, Take 1.25 mg by mouth., Disp: , Rfl:     OBJECTIVE:    Vital Signs:   Vitals:    09/30/20 0937   BP: 120/62   Pulse: 73   Resp: 18   Temp: 97.8 °F (36.6 °C)   SpO2: 97%   Weight: 100 kg (221 lb)   Height: 180.3 cm (71\")       Physical Exam:   General Appearance:    Alert, cooperative, in no acute distress   Head:    Normocephalic, without obvious abnormality, atraumatic   Eyes:            Lids and lashes normal, conjunctivae and sclerae normal, no   icterus, no pallor, corneas clear, PERRL   Ears:    Ears appear intact with no abnormalities noted   Throat:   No oral lesions, no thrush, oral mucosa moist   Neck:   No adenopathy, supple, trachea midline, no thyromegaly,  no JVD   Lungs:     Clear to auscultation,respirations regular, even and                  unlabored    Heart:    Regular rhythm and normal rate, normal S1 and S2, no            murmur "   Abdomen:     no masses, no organomegaly, soft and nontender.  No umbilical hernias or inguinal hernias present.  No abdominal wall masses.  No tenderness.   Extremities:   Moves all extremities well, no edema, no cyanosis, no             redness   Pulses:   Pulses palpable and equal bilaterally   Skin:   No bleeding, bruising or rash   Lymph nodes:   No palpable adenopathy   Neurologic:   Cranial nerves 2 - 12 grossly intact, sensation intact      Results Review:   I reviewed the patient's new clinical results.    Review of Systems was reviewed and confirmed as accurate as documented by the MA.    ASSESSMENT/PLAN:    1. Chronic diarrhea        I recommend a colonoscopy for further evaluation. The procedure was explained as well as the risks which include but are not limited to bleeding, infection, perforation, abdominal pain etc. The patient understands these risks and the procedure and wishes to proceed.  Patient placed on a low-fat diet and lactose-free diet in interim.  Recent gallbladder work-up was negative.    Electronically signed by Ras Anders MD  09/30/20 09:04 EDT

## 2020-09-30 ENCOUNTER — OFFICE VISIT (OUTPATIENT)
Dept: SURGERY | Facility: CLINIC | Age: 65
End: 2020-09-30

## 2020-09-30 ENCOUNTER — TELEPHONE (OUTPATIENT)
Dept: SURGERY | Facility: CLINIC | Age: 65
End: 2020-09-30

## 2020-09-30 VITALS
WEIGHT: 221 LBS | HEART RATE: 73 BPM | HEIGHT: 71 IN | SYSTOLIC BLOOD PRESSURE: 120 MMHG | OXYGEN SATURATION: 97 % | DIASTOLIC BLOOD PRESSURE: 62 MMHG | RESPIRATION RATE: 18 BRPM | TEMPERATURE: 97.8 F | BODY MASS INDEX: 30.94 KG/M2

## 2020-09-30 DIAGNOSIS — K52.9 CHRONIC DIARRHEA: Primary | ICD-10-CM

## 2020-09-30 DIAGNOSIS — Z01.818 PREOP TESTING: Primary | ICD-10-CM

## 2020-09-30 PROCEDURE — 99203 OFFICE O/P NEW LOW 30 MIN: CPT | Performed by: SURGERY

## 2020-09-30 RX ORDER — DEXTROMETHORPHAN HYDROBROMIDE AND QUINIDINE SULFATE 20; 10 MG/1; MG/1
CAPSULE, GELATIN COATED ORAL
COMMUNITY
Start: 2020-09-25

## 2020-09-30 RX ORDER — ROSUVASTATIN CALCIUM 20 MG/1
20 TABLET, COATED ORAL
COMMUNITY

## 2020-09-30 RX ORDER — LISINOPRIL 20 MG/1
20 TABLET ORAL
COMMUNITY
End: 2023-01-25 | Stop reason: DRUGHIGH

## 2020-09-30 RX ORDER — TRIAMCINOLONE ACETONIDE 5 MG/G
CREAM TOPICAL
COMMUNITY
Start: 2020-09-16

## 2020-09-30 RX ORDER — OMEPRAZOLE 40 MG/1
CAPSULE, DELAYED RELEASE ORAL
COMMUNITY
Start: 2020-09-28

## 2020-09-30 RX ORDER — PEN NEEDLE, DIABETIC 31 G X1/4"
NEEDLE, DISPOSABLE MISCELLANEOUS
COMMUNITY
Start: 2020-09-03 | End: 2023-01-25

## 2020-09-30 RX ORDER — DONEPEZIL HYDROCHLORIDE 10 MG/1
23 TABLET, FILM COATED ORAL
COMMUNITY
End: 2022-04-27

## 2020-09-30 RX ORDER — BISACODYL 5 MG
TABLET, DELAYED RELEASE (ENTERIC COATED) ORAL
Qty: 4 TABLET | Refills: 0 | Status: SHIPPED | OUTPATIENT
Start: 2020-09-30 | End: 2023-01-25

## 2020-09-30 RX ORDER — CLONAZEPAM 1 MG/1
TABLET ORAL
COMMUNITY
Start: 2020-09-02

## 2020-09-30 RX ORDER — GLIMEPIRIDE 4 MG/1
4 TABLET ORAL
COMMUNITY
End: 2020-10-07 | Stop reason: SDUPTHER

## 2020-09-30 RX ORDER — POLYETHYLENE GLYCOL 3350 17 G/17G
POWDER, FOR SOLUTION ORAL
Qty: 238 G | Refills: 0 | Status: SHIPPED | OUTPATIENT
Start: 2020-09-30 | End: 2023-01-25

## 2020-10-05 ENCOUNTER — LAB (OUTPATIENT)
Dept: LAB | Facility: HOSPITAL | Age: 65
End: 2020-10-05

## 2020-10-05 DIAGNOSIS — Z01.818 PREOP TESTING: ICD-10-CM

## 2020-10-05 LAB — SARS-COV-2 RNA RESP QL NAA+PROBE: NOT DETECTED

## 2020-10-05 PROCEDURE — C9803 HOPD COVID-19 SPEC COLLECT: HCPCS

## 2020-10-05 PROCEDURE — U0004 COV-19 TEST NON-CDC HGH THRU: HCPCS

## 2020-10-06 ENCOUNTER — OUTSIDE FACILITY SERVICE (OUTPATIENT)
Dept: SURGERY | Facility: CLINIC | Age: 65
End: 2020-10-06

## 2020-10-06 PROCEDURE — 45384 COLONOSCOPY W/LESION REMOVAL: CPT | Performed by: SURGERY

## 2020-10-06 PROCEDURE — 45380 COLONOSCOPY AND BIOPSY: CPT | Performed by: SURGERY

## 2020-10-07 ENCOUNTER — OFFICE VISIT (OUTPATIENT)
Dept: ENDOCRINOLOGY | Facility: CLINIC | Age: 65
End: 2020-10-07

## 2020-10-07 VITALS
TEMPERATURE: 97.5 F | WEIGHT: 224.2 LBS | HEART RATE: 66 BPM | BODY MASS INDEX: 31.39 KG/M2 | HEIGHT: 71 IN | SYSTOLIC BLOOD PRESSURE: 128 MMHG | OXYGEN SATURATION: 96 % | DIASTOLIC BLOOD PRESSURE: 78 MMHG

## 2020-10-07 DIAGNOSIS — IMO0002 DIABETES MELLITUS TYPE 2, UNCONTROLLED, WITH COMPLICATIONS: Primary | ICD-10-CM

## 2020-10-07 LAB
EXPIRATION DATE: NORMAL
HBA1C MFR BLD: 9 %
Lab: NORMAL

## 2020-10-07 PROCEDURE — 99213 OFFICE O/P EST LOW 20 MIN: CPT | Performed by: INTERNAL MEDICINE

## 2020-10-07 PROCEDURE — 83036 HEMOGLOBIN GLYCOSYLATED A1C: CPT | Performed by: INTERNAL MEDICINE

## 2020-10-07 RX ORDER — GLIMEPIRIDE 4 MG/1
4 TABLET ORAL 2 TIMES DAILY
Qty: 60 TABLET | Refills: 5 | Status: SHIPPED | OUTPATIENT
Start: 2020-10-07 | End: 2021-04-05 | Stop reason: SDUPTHER

## 2020-10-07 NOTE — ASSESSMENT & PLAN NOTE
Diabetes is worsening.   Reminded to bring in blood sugar diary at next visit.  Dietary recommendations for ADA diet.  Regular aerobic exercise.  Diabetes will be reassessed in 3 months.A1c is up from 7.3 to 9 which is not good. He dementia is worse which makes it harder to follow a diet   He is less attentive to his det.   They can titrate the insulin to get the fasting blood sugars down from 180 to 120 again

## 2020-10-07 NOTE — PROGRESS NOTES
Office Note      Date: 10/07/2020  Patient Name: Vadim Mckeon  MRN: [id number]  : 1955    Chief Complaint   Patient presents with   • Diabetes       History of Present Illness:   Vadim Mckeon is a 64 y.o. male who presents for Diabetes type 2. Diagnosed in: . Treated in past with insulin. Current treatments: insulin an dpills . Number of insulin shots per day: 1. Checks blood sugar 2 times a day. Has low blood sugar: no. Last A1c: see below  Hemoglobin A1C   Date Value Ref Range Status   10/07/2020 9.0 % Final   06/15/2020 7.3 (H) See comment % Final     Comment:     Comment:  Diagnosis of Diabetes: > or = 6.5%  Increased risk of diabetes (Prediabetes): 5.7-6.4%  Glycemic Control: Nonpregnant Adults: <7.0%                    Pregnant: <6.0%   . Changes in health since last visit: had battery replaced in dbs. Last eye exam 2020.    Subjective      Diabetic Complications:  Eyes: No  Kidneys: No  Feet: Yes, describe: mild  Heart: No    Diet and Exercise:  Meals per day: >4  Minutes of exercise per week: 150 mins.    Review of Systems:   Review of Systems   Constitutional: Positive for fatigue and unexpected weight change. Negative for fever.   Respiratory: Negative for choking and chest tightness.    Cardiovascular: Positive for chest pain and leg swelling.   Neurological: Positive for tremors and numbness.   Psychiatric/Behavioral: The patient is nervous/anxious and is hyperactive.    All other systems reviewed and are negative.      The following portions of the patient's history were reviewed and updated as appropriate: allergies, current medications, past family history, past medical history, past social history, past surgical history and problem list.    Objective       Labs:    CMP  No results found for: GLUCOSE, BUN, CREATININE, EGFRIFNONA, EGFRIFAFRI, BCR, K, CO2, CALCIUM, PROTENTOTREF, LABIL2, BILIRUBIN, AST, ALT     CBC w/DIFF  Lab Results   Component Value Date    WBC 6.1  06/15/2020    RBC 4.26 (L) 06/15/2020    HGB 13.3 06/15/2020    HCT 39.2 (L) 06/15/2020    MCV 92.0 06/15/2020    MCH 31.2 06/15/2020    MCHC 33.9 06/15/2020    RDW 13.2 06/15/2020    MPV 11.1 (H) 06/15/2020     (L) 06/15/2020    NEUTRORELPCT 71.6 06/15/2020    LYMPHORELPCT 19.6 06/15/2020    MONORELPCT 7.0 06/15/2020    EOSRELPCT 0.8 06/15/2020    BASORELPCT 0.5 06/15/2020    AUTOIGPER 0.5 06/15/2020    NEUTROABS 4.4 06/15/2020    LYMPHSABS 1.2 (L) 06/15/2020    MONOSABS 0.4 06/15/2020    EOSABS 0.1 06/15/2020    BASOSABS 0.0 06/15/2020    AUTOIGNUM 0.0 06/15/2020       Physical Exam:  Physical Exam  Constitutional:       Appearance: Normal appearance. He is normal weight.   Cardiovascular:      Pulses:           Dorsalis pedis pulses are 2+ on the right side and 2+ on the left side.        Posterior tibial pulses are 2+ on the right side and 2+ on the left side.   Musculoskeletal:      Right foot: Normal range of motion. No deformity, bunion, Charcot foot, foot drop or prominent metatarsal heads.      Left foot: Normal range of motion. No deformity, bunion, Charcot foot, foot drop or prominent metatarsal heads.   Feet:      Right foot:      Protective Sensation: 5 sites tested. 5 sites sensed.      Skin integrity: Skin integrity normal.      Toenail Condition: Right toenails are normal.      Left foot:      Protective Sensation: 5 sites tested. 5 sites sensed.      Skin integrity: Skin integrity normal.      Toenail Condition: Left toenails are normal.      Comments: Diabetic Foot Exam Performed    Neurological:      Mental Status: He is alert.   Psychiatric:         Mood and Affect: Mood normal.         Behavior: Behavior normal.         Thought Content: Thought content normal.          Assessment / Plan      Assessment & Plan:  Problem List Items Addressed This Visit        Endocrine    Diabetes mellitus type 2, uncontrolled, with complications (CMS/[facility]) - Primary    Current Assessment & Plan     Diabetes  is worsening.   Reminded to bring in blood sugar diary at next visit.  Dietary recommendations for ADA diet.  Regular aerobic exercise.  Diabetes will be reassessed in 3 months.A1c is up from 7.3 to 9 which is not good. He dementia is worse which makes it harder to follow a diet   He is less attentive to his det.   They can titrate the insulin to get the fasting blood sugars down from 180 to 120 again          Relevant Medications    glimepiride (AMARYL) 4 MG tablet    Insulin Glargine, 1 Unit Dial, (TOUJEO) 300 UNIT/ML solution pen-injector injection    metFORMIN (GLUCOPHAGE) 500 MG tablet    Other Relevant Orders    POC Glycosylated Hemoglobin (Hb A1C) (Completed)           Matthew Bills MD   10/07/2020

## 2020-10-13 NOTE — PROGRESS NOTES
Patient: Vadim Mckeon    YOB: 1955    Date: 10/14/2020    Primary Care Provider: Zuleyma Blackburn APRN    Reason for Consultation: Follow-up colonoscopy    Chief Complaint   Patient presents with   • Post-op Follow-up     1 week follow up on cecum biopsies       History of present illness:  I saw the patient in the office today as a followup from their recent colonoscopy with polypectomy, the pathology report did show tubular adenoma.  They state that they have done well and are having no complaints.  Patient has persistent diarrhea, slightly improved.  Mild colitis in the cecum but biopsy report unremarkable.    The following portions of the patient's history were reviewed and updated as appropriate: allergies, current medications, past family history, past medical history, past social history, past surgical history and problem list.    Review of Systems   Constitutional: Negative for chills, fever and unexpected weight change.   HENT: Negative for trouble swallowing and voice change.    Eyes: Negative for visual disturbance.   Respiratory: Negative for apnea, cough, chest tightness, shortness of breath and wheezing.    Cardiovascular: Negative for chest pain, palpitations and leg swelling.   Gastrointestinal: Negative for abdominal distention, abdominal pain, anal bleeding, blood in stool, constipation, diarrhea, nausea, rectal pain and vomiting.   Endocrine: Negative for cold intolerance and heat intolerance.   Genitourinary: Negative for difficulty urinating, dysuria, flank pain, scrotal swelling and testicular pain.   Musculoskeletal: Negative for back pain, gait problem and joint swelling.   Skin: Negative for color change, rash and wound.   Neurological: Negative for dizziness, syncope, speech difficulty, weakness, numbness and headaches.   Hematological: Negative for adenopathy. Does not bruise/bleed easily.   Psychiatric/Behavioral: Negative for confusion. The patient is not  nervous/anxious.        Allergies:  No Known Allergies    Medications:    Current Outpatient Medications:   •  Armodafinil 250 MG tablet, Take 1 tablet by mouth Daily., Disp: 30 tablet, Rfl: 3  •  bisacodyl (bisacodyl) 5 MG EC tablet, Take 2 at 3pm and 2 at 7pm the day prior to colonoscopy., Disp: 4 tablet, Rfl: 0  •  carbidopa-levodopa (SINEMET)  MG per tablet, , Disp: , Rfl:   •  citalopram (CeleXA) 20 MG tablet, Take 1 tablet by mouth Daily., Disp: 30 tablet, Rfl: 11  •  clonazePAM (KlonoPIN) 1 MG tablet, , Disp: , Rfl:   •  donepezil (ARICEPT) 10 MG tablet, Take 10 mg by mouth., Disp: , Rfl:   •  glimepiride (AMARYL) 4 MG tablet, Take 1 tablet by mouth 2 (two) times a day., Disp: 60 tablet, Rfl: 5  •  glucose blood (ACCU-CHEK JAMEY PLUS) test strip, Check blood sugar X 2/day - 3 days/week, Disp: 25 each, Rfl: 12  •  Insulin Glargine, 1 Unit Dial, (TOUJEO) 300 UNIT/ML solution pen-injector injection, Inject 80 Units under the skin into the appropriate area as directed Daily., Disp: 8 pen, Rfl: 5  •  lisinopril (PRINIVIL,ZESTRIL) 20 MG tablet, Take 20 mg by mouth., Disp: , Rfl:   •  lisinopril-hydrochlorothiazide (PRINZIDE,ZESTORETIC) 20-25 MG per tablet, Take 1 tablet by mouth daily., Disp: , Rfl:   •  metFORMIN (GLUCOPHAGE) 500 MG tablet, Take 1 tablet by mouth 2 (Two) Times a Day With Meals., Disp: 60 tablet, Rfl: 5  •  MIRAPEX ER 4.5 MG tablet sustained-release 24 hour, TAKE 1 TABLET BY MOUTH ONCE DAILY, Disp: 30 tablet, Rfl: 11  •  Nuedexta 20-10 MG capsule capsule, , Disp: , Rfl:   •  Omega-3 Fatty Acids (FISH OIL TRIPLE STRENGTH) 1400 MG capsule, Take 1 capsule by mouth daily., Disp: , Rfl:   •  omeprazole (priLOSEC) 40 MG capsule, , Disp: , Rfl:   •  polyethylene glycol (MIRALAX) 17 GM/SCOOP powder, Mix 238g powder with 64 oz of clear liquid. Starting at 5pm drink 80z every 10-15 minutes until consumed., Disp: 238 g, Rfl: 0  •  ranitidine (ZANTAC) 150 MG tablet, Take 150 mg by mouth 2 (two) times a  "day., Disp: , Rfl:   •  rosuvastatin (CRESTOR) 20 MG tablet, Take 20 mg by mouth., Disp: , Rfl:   •  triamcinolone (KENALOG) 0.5 % cream, apply TO TO RASH AREA TWO TO THREE times daily, Disp: , Rfl:   •  TRUEplus Pen Needles 31G X 6 MM misc, USE ONE EVERY DAY, Disp: , Rfl:   •  vitamin B-12 (CYANOCOBALAMIN) 1000 MCG tablet, Take 1,000 mcg by mouth Daily., Disp: , Rfl:   •  vitamin D (ERGOCALCIFEROL) 86255 units capsule capsule, TAKE 1 CAPSULE BY MOUTH ONCE WEEKLY, Disp: 4 capsule, Rfl: 12  •  VITAMIN D, CHOLECALCIFEROL, PO, Take 1.25 mg by mouth., Disp: , Rfl:     Vital Signs:  Vitals:    10/14/20 1051   BP: 120/62   Pulse: 62   Temp: 97.5 °F (36.4 °C)   SpO2: 98%   Weight: 102 kg (224 lb 6.4 oz)   Height: 180.3 cm (71\")       Physical Exam:   General Appearance:    Alert, cooperative, in no acute distress   Abdomen:     no masses, no organomegaly, soft and tender right lower quadrant.  No rebound or guarding.   Chest:      Clear to ausculation            Cor:     Regular rate and rhythm    Results Review:   I reviewed the patient's new clinical results.    Assessment / Plan:    1. Chronic diarrhea    2. Right lower quadrant abdominal pain    3. Adenomatous polyp of ascending colon        I did discuss the situation with the patient today in the office and they have done well from their recent colonoscopy with polypectomy.  I have released the patient back to normal activity.  I need to see the patient back in the office in 5 years and they will need to have repeat colonoscopy at that time.  Recommend probiotics and Greek yogurt daily to alleviate diarrhea and IBS symptoms.    Electronically signed by Ras Anders MD  10/14/20                  "

## 2020-10-14 ENCOUNTER — OFFICE VISIT (OUTPATIENT)
Dept: SURGERY | Facility: CLINIC | Age: 65
End: 2020-10-14

## 2020-10-14 VITALS
WEIGHT: 224.4 LBS | SYSTOLIC BLOOD PRESSURE: 120 MMHG | TEMPERATURE: 97.5 F | HEIGHT: 71 IN | OXYGEN SATURATION: 98 % | BODY MASS INDEX: 31.42 KG/M2 | DIASTOLIC BLOOD PRESSURE: 62 MMHG | HEART RATE: 62 BPM

## 2020-10-14 DIAGNOSIS — D12.2 ADENOMATOUS POLYP OF ASCENDING COLON: ICD-10-CM

## 2020-10-14 DIAGNOSIS — R10.31 RIGHT LOWER QUADRANT ABDOMINAL PAIN: ICD-10-CM

## 2020-10-14 DIAGNOSIS — K52.9 CHRONIC DIARRHEA: Primary | ICD-10-CM

## 2020-10-14 PROCEDURE — 99212 OFFICE O/P EST SF 10 MIN: CPT | Performed by: SURGERY

## 2020-12-22 DIAGNOSIS — E11.9 TYPE 2 DIABETES MELLITUS, CONTROLLED (HCC): ICD-10-CM

## 2020-12-22 RX ORDER — CALCIUM CITRATE/VITAMIN D3 200MG-6.25
TABLET ORAL
Qty: 100 EACH | Refills: 11 | Status: SHIPPED | OUTPATIENT
Start: 2020-12-22

## 2021-03-19 DIAGNOSIS — IMO0002 DIABETES MELLITUS TYPE 2, UNCONTROLLED, WITH COMPLICATIONS: ICD-10-CM

## 2021-03-19 NOTE — TELEPHONE ENCOUNTER
PLEASE CALL IN FOR PT HIS TOUJEO TO [facility] IN [place]    PTS NUMBER IF YOU HAVE QUESTIONS [phone]

## 2021-04-05 DIAGNOSIS — IMO0002 DIABETES MELLITUS TYPE 2, UNCONTROLLED, WITH COMPLICATIONS: ICD-10-CM

## 2021-04-05 RX ORDER — GLIMEPIRIDE 4 MG/1
4 TABLET ORAL 2 TIMES DAILY
Qty: 60 TABLET | Refills: 5 | Status: SHIPPED | OUTPATIENT
Start: 2021-04-05 | End: 2021-07-09

## 2021-04-09 ENCOUNTER — OFFICE VISIT (OUTPATIENT)
Dept: ENDOCRINOLOGY | Facility: CLINIC | Age: 66
End: 2021-04-09

## 2021-04-09 VITALS
WEIGHT: 219 LBS | DIASTOLIC BLOOD PRESSURE: 60 MMHG | HEIGHT: 71 IN | HEART RATE: 75 BPM | BODY MASS INDEX: 30.66 KG/M2 | SYSTOLIC BLOOD PRESSURE: 130 MMHG | TEMPERATURE: 96.9 F | OXYGEN SATURATION: 98 %

## 2021-04-09 DIAGNOSIS — E11.65 UNCONTROLLED TYPE 2 DIABETES MELLITUS WITH HYPERGLYCEMIA (HCC): Primary | ICD-10-CM

## 2021-04-09 LAB
EXPIRATION DATE: NORMAL
HBA1C MFR BLD: 7.9 %
Lab: NORMAL

## 2021-04-09 PROCEDURE — 99213 OFFICE O/P EST LOW 20 MIN: CPT | Performed by: INTERNAL MEDICINE

## 2021-04-09 PROCEDURE — 83036 HEMOGLOBIN GLYCOSYLATED A1C: CPT | Performed by: INTERNAL MEDICINE

## 2021-04-09 NOTE — PROGRESS NOTES
"     Office Note      Date: 2021  Patient Name: Vadim Mckeon  MRN: [id number]  : 1955    Chief Complaint   Patient presents with   • Diabetes       History of Present Illness:   Vadim Mckeon is a 65 y.o. male who presents for Diabetes - type 2  On metformin and toujeo and glimepiride.  He did get a covid shot.  bg checks- daily fasting- most are 115-20   Lowest was 70     Last A1c:  Hemoglobin A1C   Date Value Ref Range Status   10/07/2020 9.0 % Final   06/15/2020 7.3 (H) See comment % Final     Comment:     Comment:  Diagnosis of Diabetes: > or = 6.5%  Increased risk of diabetes (Prediabetes): 5.7-6.4%  Glycemic Control: Nonpregnant Adults: <7.0%                    Pregnant: <6.0%       Changes in health since last visit: none . Last eye exam going next month  Feet- gets checked every  3 months  No open wounds .    Subjective        Review of Systems:   Review of Systems   Constitutional: Negative.    HENT: Negative.    Eyes: Negative.    All other systems reviewed and are negative.      The following portions of the patient's history were reviewed and updated as appropriate: allergies, current medications, past family history, past medical history, past social history, past surgical history and problem list.    Objective     Visit Vitals  /60 (BP Location: Right arm, Patient Position: Sitting, Cuff Size: Adult)   Pulse 75   Temp 96.9 °F (36.1 °C) (Infrared)   Ht 180.3 cm (71\")   Wt 99.3 kg (219 lb)   SpO2 98%   BMI 30.54 kg/m²       Labs:    CMP  No results found for: GLUCOSE, BUN, CREATININE, EGFRIFNONA, EGFRIFAFRI, BCR, K, CO2, CALCIUM, PROTENTOTREF, LABIL2, BILIRUBIN, AST, ALT     CBC w/DIFF  Lab Results   Component Value Date    WBC 6.1 06/15/2020    RBC 4.26 (L) 06/15/2020    HGB 13.3 06/15/2020    HCT 39.2 (L) 06/15/2020    MCV 92.0 06/15/2020    MCH 31.2 06/15/2020    MCHC 33.9 06/15/2020    RDW 13.2 06/15/2020    MPV 11.1 (H) 06/15/2020     (L) 06/15/2020    " NEUTRORELPCT 71.6 06/15/2020    LYMPHORELPCT 19.6 06/15/2020    MONORELPCT 7.0 06/15/2020    EOSRELPCT 0.8 06/15/2020    BASORELPCT 0.5 06/15/2020    AUTOIGPER 0.5 06/15/2020    NEUTROABS 4.4 06/15/2020    LYMPHSABS 1.2 (L) 06/15/2020    MONOSABS 0.4 06/15/2020    EOSABS 0.1 06/15/2020    BASOSABS 0.0 06/15/2020    AUTOIGNUM 0.0 06/15/2020       Physical Exam:  Physical Exam  Vitals reviewed.   Constitutional:       Appearance: Normal appearance.   Neurological:      Mental Status: He is alert. Mental status is at baseline.   Psychiatric:         Mood and Affect: Mood normal.          Assessment / Plan      Assessment & Plan:  Problem List Items Addressed This Visit        Other    Uncontrolled type 2 diabetes mellitus with hyperglycemia (CMS/HCC) - Primary    Current Assessment & Plan     Diabetes is improving with treatment.   Continue current treatment regimen.  Diabetes will be reassessed in 3 months.         Relevant Medications    metFORMIN (GLUCOPHAGE) 500 MG tablet    Insulin Glargine, 1 Unit Dial, (TOUJEO) 300 UNIT/ML solution pen-injector injection    glimepiride (AMARYL) 4 MG tablet    Other Relevant Orders    POC Glycosylated Hemoglobin (Hb A1C)           Matthew Bills MD   04/09/2021

## 2021-04-12 DIAGNOSIS — IMO0002 DIABETES MELLITUS TYPE 2, UNCONTROLLED, WITH COMPLICATIONS: ICD-10-CM

## 2021-06-03 ENCOUNTER — HOSPITAL ENCOUNTER (OUTPATIENT)
Facility: HOSPITAL | Age: 66
Discharge: HOME | End: 2021-06-03
Payer: MEDICARE

## 2021-06-03 LAB
A/G RATIO: 1.7 (ref 0.8–2)
ALBUMIN SERPL-MCNC: 4.5 G/DL (ref 3.4–4.8)
ALP BLD-CCNC: 86 U/L (ref 25–100)
ALT SERPL-CCNC: 10 U/L (ref 4–36)
ANION GAP SERPL CALCULATED.3IONS-SCNC: 16 MMOL/L (ref 3–16)
AST SERPL-CCNC: 22 U/L (ref 8–33)
BASOPHILS ABSOLUTE: 0 K/UL (ref 0–0.1)
BASOPHILS RELATIVE PERCENT: 0.5 %
BILIRUB SERPL-MCNC: 0.5 MG/DL (ref 0.3–1.2)
BUN BLDV-MCNC: 30 MG/DL (ref 6–20)
CALCIUM SERPL-MCNC: 9.2 MG/DL (ref 8.5–10.5)
CHLORIDE BLD-SCNC: 97 MMOL/L (ref 98–107)
CHOLESTEROL, TOTAL: 91 MG/DL (ref 0–200)
CO2: 25 MMOL/L (ref 20–30)
CREAT SERPL-MCNC: 1.4 MG/DL (ref 0.4–1.2)
CREATININE URINE: 336 MG/DL (ref 1.5–300)
EOSINOPHILS ABSOLUTE: 0 K/UL (ref 0–0.4)
EOSINOPHILS RELATIVE PERCENT: 0.5 %
FOLATE: 9.86 NG/ML
GFR AFRICAN AMERICAN: >59
GFR NON-AFRICAN AMERICAN: 51
GLOBULIN: 2.7 G/DL
GLUCOSE BLD-MCNC: 154 MG/DL (ref 74–106)
HBA1C MFR BLD: 8.3 %
HCT VFR BLD CALC: 38.7 % (ref 40–54)
HDLC SERPL-MCNC: 29 MG/DL (ref 40–60)
HEMOGLOBIN: 12.7 G/DL (ref 13–18)
IMMATURE GRANULOCYTES #: 0 K/UL
IMMATURE GRANULOCYTES %: 0.4 % (ref 0–5)
LDL CHOLESTEROL CALCULATED: 13 MG/DL
LYMPHOCYTES ABSOLUTE: 1.4 K/UL (ref 1.5–4)
LYMPHOCYTES RELATIVE PERCENT: 15.9 %
MCH RBC QN AUTO: 31.3 PG (ref 27–32)
MCHC RBC AUTO-ENTMCNC: 32.8 G/DL (ref 31–35)
MCV RBC AUTO: 95.3 FL (ref 80–100)
MICROALBUMIN UR-MCNC: 3 MG/DL (ref 0–22)
MICROALBUMIN/CREAT UR-RTO: 8.9 MG/G (ref 0–30)
MONOCYTES ABSOLUTE: 0.5 K/UL (ref 0.2–0.8)
MONOCYTES RELATIVE PERCENT: 6.3 %
NEUTROPHILS ABSOLUTE: 6.5 K/UL (ref 2–7.5)
NEUTROPHILS RELATIVE PERCENT: 76.4 %
PDW BLD-RTO: 13.6 % (ref 11–16)
PLATELET # BLD: 158 K/UL (ref 150–400)
PMV BLD AUTO: 11.4 FL (ref 6–10)
POTASSIUM SERPL-SCNC: 4.6 MMOL/L (ref 3.4–5.1)
PROSTATE SPECIFIC ANTIGEN: 1.46 NG/ML (ref 0–4)
RBC # BLD: 4.06 M/UL (ref 4.5–6)
SODIUM BLD-SCNC: 138 MMOL/L (ref 136–145)
TOTAL PROTEIN: 7.2 G/DL (ref 6.4–8.3)
TRIGL SERPL-MCNC: 247 MG/DL (ref 0–249)
TSH SERPL DL<=0.05 MIU/L-ACNC: 2.22 UIU/ML (ref 0.27–4.2)
VITAMIN B-12: 257 PG/ML (ref 211–911)
VITAMIN D 25-HYDROXY: 39.9 (ref 32–100)
VLDLC SERPL CALC-MCNC: 49 MG/DL
WBC # BLD: 8.5 K/UL (ref 4–11)

## 2021-06-03 PROCEDURE — 83036 HEMOGLOBIN GLYCOSYLATED A1C: CPT

## 2021-06-03 PROCEDURE — 82607 VITAMIN B-12: CPT

## 2021-06-03 PROCEDURE — 84153 ASSAY OF PSA TOTAL: CPT

## 2021-06-03 PROCEDURE — 82746 ASSAY OF FOLIC ACID SERUM: CPT

## 2021-06-03 PROCEDURE — 80053 COMPREHEN METABOLIC PANEL: CPT

## 2021-06-03 PROCEDURE — 82570 ASSAY OF URINE CREATININE: CPT

## 2021-06-03 PROCEDURE — 85025 COMPLETE CBC W/AUTO DIFF WBC: CPT

## 2021-06-03 PROCEDURE — 82043 UR ALBUMIN QUANTITATIVE: CPT

## 2021-06-03 PROCEDURE — 82306 VITAMIN D 25 HYDROXY: CPT

## 2021-06-03 PROCEDURE — 80061 LIPID PANEL: CPT

## 2021-06-03 PROCEDURE — 84443 ASSAY THYROID STIM HORMONE: CPT

## 2021-07-09 ENCOUNTER — OFFICE VISIT (OUTPATIENT)
Dept: ENDOCRINOLOGY | Facility: CLINIC | Age: 66
End: 2021-07-09

## 2021-07-09 VITALS
BODY MASS INDEX: 30.52 KG/M2 | DIASTOLIC BLOOD PRESSURE: 68 MMHG | WEIGHT: 218 LBS | OXYGEN SATURATION: 98 % | SYSTOLIC BLOOD PRESSURE: 130 MMHG | HEART RATE: 62 BPM | HEIGHT: 71 IN

## 2021-07-09 DIAGNOSIS — IMO0002 DIABETES MELLITUS TYPE 2, UNCONTROLLED, WITH COMPLICATIONS: ICD-10-CM

## 2021-07-09 DIAGNOSIS — E11.65 UNCONTROLLED TYPE 2 DIABETES MELLITUS WITH HYPERGLYCEMIA (HCC): Primary | ICD-10-CM

## 2021-07-09 PROCEDURE — 99213 OFFICE O/P EST LOW 20 MIN: CPT | Performed by: INTERNAL MEDICINE

## 2021-07-09 RX ORDER — DAPAGLIFLOZIN 5 MG/1
5 TABLET, FILM COATED ORAL DAILY
Qty: 30 TABLET | Refills: 5 | Status: SHIPPED | OUTPATIENT
Start: 2021-07-09 | End: 2022-02-01

## 2021-07-09 NOTE — ASSESSMENT & PLAN NOTE
a1c improved but still not at goal. We will stop sfu  And add farxiga. Side effects were addressed.. will stay on insulin and metformin as well.

## 2021-07-09 NOTE — PROGRESS NOTES
"     Office Note      Date: 2021  Patient Name: Vadim Mckeon  MRN: [id number]  : 1955    Chief Complaint   Patient presents with   • Diabetes       History of Present Illness:   Vadim Mckeon is a 65 y.o. male who presents for Diabetes - type 2   On metformin and sfu  Dementia is worsening and makes it harder to control his diet.   Last A1c:  Hemoglobin A1C   Date Value Ref Range Status   2021 8.3 (H) See comment % Final     Comment:     Comment:  Diagnosis of Diabetes: > or = 6.5%  Increased risk of diabetes (Prediabetes): 5.7-6.4%  Glycemic Control: Nonpregnant Adults: <7.0%                    Pregnant: <6.0%       Changes in health since last visit: see above . Last eye exam last week .    Subjective              Review of Systems:   Review of Systems   Constitutional: Negative.        The following portions of the patient's history were reviewed and updated as appropriate: allergies, current medications, past family history, past medical history, past social history, past surgical history and problem list.    Objective     Visit Vitals  /68 (BP Location: Left arm, Patient Position: Sitting, Cuff Size: Adult)   Pulse 62   Ht 180.3 cm (71\")   Wt 98.9 kg (218 lb)   SpO2 98%   BMI 30.40 kg/m²       Labs:    CMP  No results found for: GLUCOSE, BUN, CREATININE, EGFRIFNONA, EGFRIFAFRI, BCR, K, CO2, CALCIUM, PROTENTOTREF, LABIL2, BILIRUBIN, AST, ALT     CBC w/DIFF  Lab Results   Component Value Date    WBC 8.5 2021    RBC 4.06 (L) 2021    HGB 12.7 (L) 2021    HCT 38.7 (L) 2021    MCV 95.3 2021    MCH 31.3 2021    MCHC 32.8 2021    RDW 13.6 2021    MPV 11.4 (H) 2021     2021    NEUTRORELPCT 76.4 2021    LYMPHORELPCT 15.9 2021    MONORELPCT 6.3 2021    EOSRELPCT 0.5 2021    BASORELPCT 0.5 2021    AUTOIGPER 0.4 2021    NEUTROABS 6.5 2021    LYMPHSABS 1.4 (L) 2021    " MONOSABS 0.5 06/03/2021    EOSABS 0.0 06/03/2021    BASOSABS 0.0 06/03/2021    AUTOIGNUM 0.0 06/03/2021       Physical Exam:  Physical Exam  Vitals reviewed.   Constitutional:       Appearance: Normal appearance.   Neurological:      Mental Status: He is alert.          Assessment / Plan      Assessment & Plan:  Problem List Items Addressed This Visit        Other    Uncontrolled type 2 diabetes mellitus with hyperglycemia (CMS/[facility]) - Primary    Current Assessment & Plan      a1c improved but still not at goal. We will stop sfu  And add farxiga. Side effects were addressed.. will stay on insulin and metformin as well.          Relevant Medications    Insulin Glargine, 1 Unit Dial, (TOUJEO) 300 UNIT/ML solution pen-injector injection    metFORMIN (GLUCOPHAGE) 500 MG tablet    dapagliflozin (Farxiga) 5 MG tablet tablet           Matthew Bills MD   07/09/2021

## 2021-07-13 RX ORDER — PEN NEEDLE, DIABETIC 31 GX5/16"
NEEDLE, DISPOSABLE MISCELLANEOUS
Qty: 100 EACH | Refills: 1 | Status: SHIPPED | OUTPATIENT
Start: 2021-07-13 | End: 2022-01-11

## 2021-09-09 ENCOUNTER — OFFICE VISIT (OUTPATIENT)
Dept: CARDIOLOGY | Facility: CLINIC | Age: 66
End: 2021-09-09

## 2021-09-09 VITALS
OXYGEN SATURATION: 98 % | RESPIRATION RATE: 18 BRPM | HEIGHT: 71 IN | SYSTOLIC BLOOD PRESSURE: 128 MMHG | DIASTOLIC BLOOD PRESSURE: 72 MMHG | BODY MASS INDEX: 30.8 KG/M2 | HEART RATE: 88 BPM | WEIGHT: 220 LBS

## 2021-09-09 DIAGNOSIS — I10 ESSENTIAL HYPERTENSION: ICD-10-CM

## 2021-09-09 DIAGNOSIS — E78.5 HYPERLIPIDEMIA, UNSPECIFIED HYPERLIPIDEMIA TYPE: ICD-10-CM

## 2021-09-09 DIAGNOSIS — G20 PARKINSON'S DISEASE (HCC): ICD-10-CM

## 2021-09-09 DIAGNOSIS — E11.65 UNCONTROLLED TYPE 2 DIABETES MELLITUS WITH HYPERGLYCEMIA (HCC): ICD-10-CM

## 2021-09-09 DIAGNOSIS — R06.09 DYSPNEA ON EXERTION: Primary | ICD-10-CM

## 2021-09-09 PROCEDURE — 99204 OFFICE O/P NEW MOD 45 MIN: CPT | Performed by: INTERNAL MEDICINE

## 2021-09-09 PROCEDURE — 93000 ELECTROCARDIOGRAM COMPLETE: CPT | Performed by: INTERNAL MEDICINE

## 2021-09-09 RX ORDER — MONTELUKAST SODIUM 4 MG/1
1 TABLET, CHEWABLE ORAL 2 TIMES DAILY
COMMUNITY

## 2021-09-09 NOTE — PROGRESS NOTES
[facility] Cardiology OP Consult Note    Vadim Mckeon  [id number]  2021    Referred By: Krystle Keita APRN    Chief Complaint: Establish cardiac care    History of Present Illness:   Mr. Vadim Mckeon is a 65 y.o. male who presents to the Cardiology Clinic to establish cardiac care.  The patient has a past medical history significant for type 2 diabetes mellitus, hypertension, lipidemia, dementia, and Parkinson's disease.  He does not have any significant past cardiac history.  He presents the cardiology clinic today to establish general cardiac care.  The patient reports he is active around his home is able to perform daily-such as mowing grass without exertional chest pain or anginal symptoms.  He does report mild exertional dyspnea, which only occurs when walking up a steep incline.  His dyspnea does not limit his activity.  He denies any significant palpitations.  No presyncopal or syncopal events.  He does report mild left lower extremity edema, typically worse at the end of the day.  No orthopnea, PND, or lower extremity swelling.  No other specific complaints today.    Past Cardiac Testin. Last Coronary Angio: None  2. Prior Stress Testing: None  3. Last Echo: None  4. Prior Holter Monitor: None    Review of Systems:   Review of Systems   Constitutional: Negative for activity change, appetite change, chills, diaphoresis, fatigue, fever, unexpected weight gain and unexpected weight loss.   Eyes: Negative for blurred vision and double vision.   Respiratory: Positive for shortness of breath. Negative for cough, chest tightness and wheezing.    Cardiovascular: Negative for chest pain, palpitations and leg swelling.   Gastrointestinal: Negative for abdominal pain, anal bleeding, blood in stool and GERD.   Endocrine: Negative for cold intolerance and heat intolerance.   Genitourinary: Negative for hematuria.   Neurological: Negative for dizziness, syncope, weakness  and light-headedness.   Hematological: Does not bruise/bleed easily.   Psychiatric/Behavioral: Negative for depressed mood and stress. The patient is not nervous/anxious.        Past Medical History:   Past Medical History:   Diagnosis Date   • Hypercholesterolemia    • Hyperlipidemia    • Hypertension    • Movement disorder    • APOLINAR (obstructive sleep apnea)    • Parkinson's disease (CMS/HCC)    • Sprained ankle     History of   • Type II diabetes mellitus, uncontrolled (CMS/HCC)        Past Surgical History:   Past Surgical History:   Procedure Laterality Date   • KNEE ARTHROSCOPY W/ MENISCAL REPAIR         Family History:   Family History   Problem Relation Age of Onset   • Other Other         myocardial infarction   • Cancer Father    • Heart attack Father    • Cancer Mother    • Diabetes Maternal Aunt    • Diabetes Maternal Uncle    • Cancer Brother        Social History:   Social History     Socioeconomic History   • Marital status:      Spouse name: Not on file   • Number of children: Not on file   • Years of education: Not on file   • Highest education level: Not on file   Tobacco Use   • Smoking status: Never Smoker   • Smokeless tobacco: Never Used   Vaping Use   • Vaping Use: Never used   Substance and Sexual Activity   • Alcohol use: Yes     Comment: 2 drinks/month   • Drug use: No   • Sexual activity: Defer       Medications:     Current Outpatient Medications:   •  carbidopa-levodopa (SINEMET)  MG per tablet, 4 (Four) Times a Day., Disp: , Rfl:   •  citalopram (CeleXA) 20 MG tablet, Take 1 tablet by mouth Daily. (Patient taking differently: 40 mg Daily.), Disp: 30 tablet, Rfl: 11  •  clonazePAM (KlonoPIN) 1 MG tablet, , Disp: , Rfl:   •  colestipol (COLESTID) 1 g tablet, Take 1 g by mouth 2 (Two) Times a Day., Disp: , Rfl:   •  dapagliflozin (Farxiga) 5 MG tablet tablet, Take 1 tablet by mouth Daily., Disp: 30 tablet, Rfl: 5  •  donepezil (ARICEPT) 10 MG tablet, Take 23 mg by mouth., Disp:  , Rfl:   •  Insulin Glargine, 1 Unit Dial, (TOUJEO) 300 UNIT/ML solution pen-injector injection, Inject 80 Units under the skin into the appropriate area as directed Daily., Disp: 8 pen, Rfl: 5  •  lisinopril-hydrochlorothiazide (PRINZIDE,ZESTORETIC) 20-25 MG per tablet, Take 1 tablet by mouth daily., Disp: , Rfl:   •  metFORMIN (GLUCOPHAGE) 500 MG tablet, Take 1 tablet by mouth 2 (Two) Times a Day With Meals., Disp: 60 tablet, Rfl: 5  •  MIRAPEX ER 4.5 MG tablet sustained-release 24 hour, TAKE 1 TABLET BY MOUTH ONCE DAILY, Disp: 30 tablet, Rfl: 11  •  Nuedexta 20-10 MG capsule capsule, , Disp: , Rfl:   •  omeprazole (priLOSEC) 40 MG capsule, , Disp: , Rfl:   •  rosuvastatin (CRESTOR) 20 MG tablet, Take 20 mg by mouth., Disp: , Rfl:   •  triamcinolone (KENALOG) 0.5 % cream, apply TO TO RASH AREA TWO TO THREE times daily, Disp: , Rfl:   •  True Metrix Blood Glucose Test test strip, TEST BLOOD SUGAR TWICE A DAY, Disp: 100 each, Rfl: 11  •  TRUEplus Pen Needles 31G X 6 MM misc, USE ONE EVERY DAY, Disp: , Rfl:   •  Unifine Pentips 31G X 8 MM misc, USE ONE EVERY DAY, Disp: 100 each, Rfl: 1  •  vitamin B-12 (CYANOCOBALAMIN) 1000 MCG tablet, Take 1,000 mcg by mouth Daily., Disp: , Rfl:   •  vitamin D (ERGOCALCIFEROL) 50846 units capsule capsule, TAKE 1 CAPSULE BY MOUTH ONCE WEEKLY, Disp: 4 capsule, Rfl: 12  •  VITAMIN D, CHOLECALCIFEROL, PO, Take 1.25 mg by mouth., Disp: , Rfl:   •  Armodafinil 250 MG tablet, Take 1 tablet by mouth Daily., Disp: 30 tablet, Rfl: 3  •  bisacodyl (bisacodyl) 5 MG EC tablet, Take 2 at 3pm and 2 at 7pm the day prior to colonoscopy., Disp: 4 tablet, Rfl: 0  •  lisinopril (PRINIVIL,ZESTRIL) 20 MG tablet, Take 20 mg by mouth., Disp: , Rfl:   •  Omega-3 Fatty Acids (FISH OIL TRIPLE STRENGTH) 1400 MG capsule, Take 1 capsule by mouth daily., Disp: , Rfl:   •  polyethylene glycol (MIRALAX) 17 GM/SCOOP powder, Mix 238g powder with 64 oz of clear liquid. Starting at 5pm drink 80z every 10-15 minutes  "until consumed., Disp: 238 g, Rfl: 0  •  ranitidine (ZANTAC) 150 MG tablet, Take 150 mg by mouth 2 (two) times a day., Disp: , Rfl:     Allergies:   No Known Allergies    Physical Exam:  Vital Signs:   Vitals:    09/09/21 1443   BP: 128/72   BP Location: Left arm   Patient Position: Sitting   Pulse: 88   Resp: 18   SpO2: 98%   Weight: 99.8 kg (220 lb)   Height: 180.3 cm (71\")       Physical Exam  Constitutional:       General: He is not in acute distress.     Appearance: Normal appearance. He is well-developed. He is not diaphoretic.   HENT:      Head: Normocephalic and atraumatic.   Eyes:      General: No scleral icterus.     Pupils: Pupils are equal, round, and reactive to light.   Neck:      Trachea: No tracheal deviation.   Cardiovascular:      Rate and Rhythm: Normal rate and regular rhythm.      Heart sounds: Normal heart sounds. No murmur heard.   No friction rub. No gallop.       Comments: Normal JVD.  Pulmonary:      Effort: Pulmonary effort is normal. No respiratory distress.      Breath sounds: Normal breath sounds. No stridor. No wheezing or rales.   Chest:      Chest wall: No tenderness.   Abdominal:      General: Bowel sounds are normal. There is no distension.      Palpations: Abdomen is soft.      Tenderness: There is no abdominal tenderness. There is no guarding or rebound.   Musculoskeletal:         General: No swelling. Normal range of motion.      Cervical back: Neck supple. No tenderness.   Lymphadenopathy:      Cervical: No cervical adenopathy.   Skin:     General: Skin is warm and dry.      Findings: No erythema.   Neurological:      General: No focal deficit present.      Mental Status: He is alert and oriented to person, place, and time.   Psychiatric:         Mood and Affect: Mood normal.         Behavior: Behavior normal.         Results Review:   I reviewed the patient's new clinical results.  I personally viewed and interpreted the patient's EKG/Telemetry data      ECG 12 " Lead    Date/Time: 9/9/2021 3:15 PM  Performed by: Germain Levy MD  Authorized by: Germain Levy MD   Comparison: not compared with previous ECG   Rhythm: sinus rhythm  Rate: normal  QRS axis: normal  Clinical impression comment: Sinus rhythm with baseline artifact due to presence of neurostimulator.              Assessment / Plan:     1.  Dyspnea  --Chief complaint today is mild exertional dyspnea, which does not currently limit daily 70s  --No exertional chest pain with low suspicion for dyspnea being an anginal equivalent  --ECG today shows NSR, baseline artifact due to presence of neural stimulator  --Will obtain baseline echocardiogram to rule underlying structural valvular abnormalities which could contribute to exertional dyspnea  --Follow-up in 1 year, or sooner if required pending results of echocardiogram    2. Essential hypertension  --Well-controlled with current antihypertensives    3. Hyperlipidemia  --On statin    4.  Type 2 diabetes mellitus  --Management per PCP    5. Parkinson's disease  --Follows with neurology      Follow Up:   Return in about 1 year (around 9/9/2022).      Thank you for allowing me to participate in the care of your patient. Please to not hesitate to contact me with additional questions or concerns.     GUTIERREZ Levy MD  Interventional Cardiology   09/09/2021  14:46 EDT

## 2021-09-13 ENCOUNTER — HOSPITAL ENCOUNTER (OUTPATIENT)
Facility: HOSPITAL | Age: 66
Discharge: HOME | End: 2021-09-13
Payer: MEDICARE

## 2021-09-13 PROCEDURE — 36415 COLL VENOUS BLD VENIPUNCTURE: CPT

## 2021-09-13 PROCEDURE — 82306 VITAMIN D 25 HYDROXY: CPT

## 2021-09-13 PROCEDURE — 82607 VITAMIN B-12: CPT

## 2021-09-13 PROCEDURE — 85025 COMPLETE CBC W/AUTO DIFF WBC: CPT

## 2021-09-13 PROCEDURE — 80061 LIPID PANEL: CPT

## 2021-09-13 PROCEDURE — 82746 ASSAY OF FOLIC ACID SERUM: CPT

## 2021-09-13 PROCEDURE — 84443 ASSAY THYROID STIM HORMONE: CPT

## 2021-09-13 PROCEDURE — 80053 COMPREHEN METABOLIC PANEL: CPT

## 2021-09-14 LAB
A/G RATIO: 1.9 (ref 0.8–2)
ALBUMIN SERPL-MCNC: 4.8 G/DL (ref 3.4–4.8)
ALP BLD-CCNC: 98 U/L (ref 25–100)
ALT SERPL-CCNC: 18 U/L (ref 4–36)
ANION GAP SERPL CALCULATED.3IONS-SCNC: 13 MMOL/L (ref 3–16)
AST SERPL-CCNC: 20 U/L (ref 8–33)
BASOPHILS ABSOLUTE: 0.1 K/UL (ref 0–0.1)
BASOPHILS RELATIVE PERCENT: 0.7 %
BILIRUB SERPL-MCNC: 0.5 MG/DL (ref 0.3–1.2)
BUN BLDV-MCNC: 22 MG/DL (ref 6–20)
CALCIUM SERPL-MCNC: 9.3 MG/DL (ref 8.5–10.5)
CHLORIDE BLD-SCNC: 95 MMOL/L (ref 98–107)
CHOLESTEROL, TOTAL: 108 MG/DL (ref 0–200)
CO2: 25 MMOL/L (ref 20–30)
CREAT SERPL-MCNC: 1.4 MG/DL (ref 0.4–1.2)
EOSINOPHILS ABSOLUTE: 0.1 K/UL (ref 0–0.4)
EOSINOPHILS RELATIVE PERCENT: 0.7 %
FOLATE: 9.29 NG/ML
GFR AFRICAN AMERICAN: >59
GFR NON-AFRICAN AMERICAN: 51
GLOBULIN: 2.5 G/DL
GLUCOSE BLD-MCNC: 361 MG/DL (ref 74–106)
HCT VFR BLD CALC: 42.7 % (ref 40–54)
HDLC SERPL-MCNC: 28 MG/DL (ref 40–60)
HEMOGLOBIN: 14 G/DL (ref 13–18)
IMMATURE GRANULOCYTES #: 0 K/UL
IMMATURE GRANULOCYTES %: 0.3 % (ref 0–5)
LDL CHOLESTEROL CALCULATED: 8 MG/DL
LYMPHOCYTES ABSOLUTE: 1.4 K/UL (ref 1.5–4)
LYMPHOCYTES RELATIVE PERCENT: 15.6 %
MCH RBC QN AUTO: 30.6 PG (ref 27–32)
MCHC RBC AUTO-ENTMCNC: 32.8 G/DL (ref 31–35)
MCV RBC AUTO: 93.4 FL (ref 80–100)
MONOCYTES ABSOLUTE: 0.6 K/UL (ref 0.2–0.8)
MONOCYTES RELATIVE PERCENT: 6.2 %
NEUTROPHILS ABSOLUTE: 6.7 K/UL (ref 2–7.5)
NEUTROPHILS RELATIVE PERCENT: 76.5 %
PDW BLD-RTO: 13 % (ref 11–16)
PLATELET # BLD: 196 K/UL (ref 150–400)
PMV BLD AUTO: 11.1 FL (ref 6–10)
POTASSIUM SERPL-SCNC: 4.4 MMOL/L (ref 3.4–5.1)
RBC # BLD: 4.57 M/UL (ref 4.5–6)
SODIUM BLD-SCNC: 133 MMOL/L (ref 136–145)
TOTAL PROTEIN: 7.3 G/DL (ref 6.4–8.3)
TRIGL SERPL-MCNC: 362 MG/DL (ref 0–249)
TSH SERPL DL<=0.05 MIU/L-ACNC: 2.87 UIU/ML (ref 0.27–4.2)
VITAMIN B-12: 291 PG/ML (ref 211–911)
VITAMIN D 25-HYDROXY: 35.2 (ref 32–100)
VLDLC SERPL CALC-MCNC: 72 MG/DL
WBC # BLD: 8.8 K/UL (ref 4–11)

## 2021-09-20 ENCOUNTER — HOSPITAL ENCOUNTER (OUTPATIENT)
Facility: HOSPITAL | Age: 66
Discharge: HOME | End: 2021-09-20
Payer: MEDICARE

## 2021-09-20 ENCOUNTER — HOSPITAL ENCOUNTER (OUTPATIENT)
Dept: GENERAL RADIOLOGY | Facility: HOSPITAL | Age: 66
Discharge: HOME | End: 2021-09-20
Payer: MEDICARE

## 2021-09-20 DIAGNOSIS — M25.521 ELBOW PAIN, RIGHT: ICD-10-CM

## 2021-09-20 PROCEDURE — 73070 X-RAY EXAM OF ELBOW: CPT

## 2021-10-22 ENCOUNTER — OFFICE VISIT (OUTPATIENT)
Dept: ENDOCRINOLOGY | Facility: CLINIC | Age: 66
End: 2021-10-22

## 2021-10-22 VITALS
HEART RATE: 83 BPM | WEIGHT: 207 LBS | BODY MASS INDEX: 29.63 KG/M2 | SYSTOLIC BLOOD PRESSURE: 120 MMHG | OXYGEN SATURATION: 97 % | DIASTOLIC BLOOD PRESSURE: 60 MMHG | HEIGHT: 70 IN

## 2021-10-22 DIAGNOSIS — E11.65 UNCONTROLLED TYPE 2 DIABETES MELLITUS WITH HYPERGLYCEMIA (HCC): Primary | ICD-10-CM

## 2021-10-22 LAB
EXPIRATION DATE: ABNORMAL
EXPIRATION DATE: NORMAL
GLUCOSE BLDC GLUCOMTR-MCNC: 251 MG/DL (ref 70–130)
HBA1C MFR BLD: 8.7 %
Lab: ABNORMAL
Lab: NORMAL

## 2021-10-22 PROCEDURE — 99213 OFFICE O/P EST LOW 20 MIN: CPT | Performed by: INTERNAL MEDICINE

## 2021-10-22 PROCEDURE — 3052F HG A1C>EQUAL 8.0%<EQUAL 9.0%: CPT | Performed by: INTERNAL MEDICINE

## 2021-10-22 PROCEDURE — 83036 HEMOGLOBIN GLYCOSYLATED A1C: CPT | Performed by: INTERNAL MEDICINE

## 2021-10-22 PROCEDURE — 82947 ASSAY GLUCOSE BLOOD QUANT: CPT | Performed by: INTERNAL MEDICINE

## 2021-10-22 RX ORDER — SILDENAFIL 50 MG/1
TABLET, FILM COATED ORAL
Qty: 6 TABLET | Refills: 3 | Status: SHIPPED | OUTPATIENT
Start: 2021-10-22 | End: 2022-07-25

## 2021-10-22 RX ORDER — CYANOCOBALAMIN 1000 UG/ML
INJECTION, SOLUTION INTRAMUSCULAR; SUBCUTANEOUS
COMMUNITY
Start: 2021-09-20

## 2021-10-22 NOTE — ASSESSMENT & PLAN NOTE
Diabetes is unchanged.   Continue current treatment regimen.  Diabetes will be reassessed in 6 months     a1c is about the same which is ok considering all the changes in his life .  He is interested in trying to get a deni. He understands it might not be covered since he is only on one shot per day

## 2021-10-22 NOTE — PROGRESS NOTES
"     Office Note      Date: 10/22/2021  Patient Name: Vadim Mckeon  MRN: [id number]  : 1955    Chief Complaint   Patient presents with   • Diabetes       History of Present Illness:   Vadim Mckeon is a 65 y.o. male who presents for Diabetes - type 2  On metformin, insulin and farxiga  ---------------------------  He has been very stressed the last 3 months. His wife left him and he has had to cook for himself which is difficult with his parkinson's and his dementia  He is eating at home more  bg checks ae done daily  He has not had hypoglycemia   Last A1c:  Hemoglobin A1C   Date Value Ref Range Status   10/22/2021 8.7 % Final   2021 8.3 (H) See comment % Final     Comment:     Comment:  Diagnosis of Diabetes: > or = 6.5%  Increased risk of diabetes (Prediabetes): 5.7-6.4%  Glycemic Control: Nonpregnant Adults: <7.0%                    Pregnant: <6.0%       Changes in health since last visit: see above   . Last eye exam up to date .    Subjective        Review of Systems:   Review of Systems   Psychiatric/Behavioral: Positive for dysphoric mood.       The following portions of the patient's history were reviewed and updated as appropriate: allergies, current medications, past family history, past medical history, past social history, past surgical history and problem list.    Objective     Visit Vitals  /60   Pulse 83   Ht 177.8 cm (70\")   Wt 93.9 kg (207 lb)   SpO2 97%   BMI 29.70 kg/m²       Labs:    CMP  No results found for: GLUCOSE, BUN, CREATININE, EGFRIFNONA, EGFRIFAFRI, BCR, K, CO2, CALCIUM, PROTENTOTREF, LABIL2, BILIRUBIN, AST, ALT     CBC w/DIFF  Lab Results   Component Value Date    WBC 8.8 2021    RBC 4.57 2021    HGB 14.0 2021    HCT 42.7 2021    MCV 93.4 2021    MCH 30.6 2021    MCHC 32.8 2021    RDW 13.0 2021    MPV 11.1 (H) 2021     2021    NEUTRORELPCT 76.5 2021    LYMPHORELPCT 15.6 " 09/13/2021    MONORELPCT 6.2 09/13/2021    EOSRELPCT 0.7 09/13/2021    BASORELPCT 0.7 09/13/2021    AUTOIGPER 0.3 09/13/2021    NEUTROABS 6.7 09/13/2021    LYMPHSABS 1.4 (L) 09/13/2021    MONOSABS 0.6 09/13/2021    EOSABS 0.1 09/13/2021    BASOSABS 0.1 09/13/2021    AUTOIGNUM 0.0 09/13/2021       Physical Exam:  Physical Exam  Vitals reviewed.   Constitutional:       Appearance: Normal appearance.   Musculoskeletal:      Right foot: Deformity present.      Left foot: Deformity present.   Feet:      Right foot:      Protective Sensation: 10 sites tested. 6 sites sensed.      Skin integrity: Skin integrity normal.      Toenail Condition: Right toenails are abnormally thick.      Left foot:      Protective Sensation: 10 sites tested. 6 sites sensed.      Skin integrity: Skin integrity normal.      Toenail Condition: Left toenails are abnormally thick.      Comments: Diabetic Foot Exam Performed and Monofilament Test Performed  Neurological:      Mental Status: He is alert.          Assessment / Plan      Assessment & Plan:  Problem List Items Addressed This Visit        Other    Uncontrolled type 2 diabetes mellitus with hyperglycemia (HCC) - Primary    Current Assessment & Plan     Diabetes is unchanged.   Continue current treatment regimen.  Diabetes will be reassessed in 6 months     a1c is about the same which is ok considering all the changes in his life .  He is interested in trying to get a deni. He understands it might not be covered since he is only on one shot per day         Relevant Medications    dapagliflozin (Farxiga) 5 MG tablet tablet    metFORMIN (GLUCOPHAGE) 500 MG tablet    Insulin Glargine, 1 Unit Dial, (TOUJEO) 300 UNIT/ML solution pen-injector injection    Other Relevant Orders    POC Glycosylated Hemoglobin (Hb A1C) (Completed)    POC Glucose, Blood (Completed)           Matthew Bills MD   10/22/2021

## 2021-10-26 ENCOUNTER — HOSPITAL ENCOUNTER (EMERGENCY)
Facility: HOSPITAL | Age: 66
Discharge: HOME | End: 2021-10-26
Attending: EMERGENCY MEDICINE
Payer: MEDICARE

## 2021-10-26 VITALS
SYSTOLIC BLOOD PRESSURE: 108 MMHG | RESPIRATION RATE: 18 BRPM | HEART RATE: 71 BPM | DIASTOLIC BLOOD PRESSURE: 70 MMHG | OXYGEN SATURATION: 96 % | BODY MASS INDEX: 30.78 KG/M2 | TEMPERATURE: 97.8 F | HEIGHT: 70 IN | WEIGHT: 215 LBS

## 2021-10-26 DIAGNOSIS — R32 URINARY INCONTINENCE, UNSPECIFIED TYPE: Primary | ICD-10-CM

## 2021-10-26 LAB
BILIRUBIN URINE: NEGATIVE
BLOOD, URINE: NEGATIVE
CHP ED QC CHECK: NORMAL
CLARITY: CLEAR
COLOR: YELLOW
GLUCOSE BLD-MCNC: 161 MG/DL
GLUCOSE BLD-MCNC: 161 MG/DL (ref 74–106)
GLUCOSE URINE: 500 MG/DL
KETONES, URINE: NEGATIVE MG/DL
LEUKOCYTE ESTERASE, URINE: NEGATIVE
MICROSCOPIC EXAMINATION: ABNORMAL
NITRITE, URINE: NEGATIVE
PERFORMED ON: ABNORMAL
PH UA: 7 (ref 5–8)
PROTEIN UA: NEGATIVE MG/DL
SPECIFIC GRAVITY UA: 1.02 (ref 1–1.03)
URINE REFLEX TO CULTURE: ABNORMAL
URINE TYPE: ABNORMAL
UROBILINOGEN, URINE: 1 E.U./DL

## 2021-10-26 PROCEDURE — 81003 URINALYSIS AUTO W/O SCOPE: CPT

## 2021-10-26 PROCEDURE — 99283 EMERGENCY DEPT VISIT LOW MDM: CPT

## 2021-10-26 ASSESSMENT — PAIN SCALES - GENERAL: PAINLEVEL_OUTOF10: 0

## 2021-10-26 NOTE — ED NOTES
AVS reviewed with patient / family, understanding verbalized. Patient discharged home with no further needs or concerns voiced. pcp follow up recommended.      Henri Rasheed RN  10/26/21 6721

## 2021-10-26 NOTE — ED PROVIDER NOTES
Sayda Gonzalez [address] [facility] ENCOUNTER      Pt Name: Jatinder Pisano  MRN: [id number]  YOB: 1955  Date of evaluation: 10/26/2021  Provider: Elbert Llamas MD    [address] [place] [address]       Chief Complaint   Patient presents with    Incontinence     urinary incontinence and weakness x 1 day per EMS  HR 70, NSR, RR 16, 96% RA, 130/70, 98.7 and #18 LAC. HISTORY OF PRESENT ILLNESS  (Location/Symptom, Timing/Onset, Context/Setting, Quality, Duration, Modifying Factors, Severity.)   Jatinder Pisano is a 60 y.o. male who presents to the emergency department presents with loss of bladder control for the last 24 hours he also has a bowel movement every time he urinates but states that he is not lost control of his bowels he knows when they need to move he is not had any back problems. Of significance he has a history of Parkinson's disease as well as diabetes he denies any dysuria or hematuria he denies any other neurologic problem i.e. numbness loss of motor control. Nursing notes were reviewed. REVIEW OFSYSTEMS    (2-9 systems for level 4, 10 or more for level 5)   ROS:  General:  No fevers, no chills, no weakness  Cardiovascular:  No chest pain, no palpitations  Respiratory:  No shortness of breath, no cough, no wheezing  Gastrointestinal:  No pain, no nausea, no vomiting, no diarrhea  Musculoskeletal:  No muscle pain, no joint pain  Skin:  No rash, no easy bruising  Neurologic:  No speech problems, no headache, no extremity weakness  Psychiatric:  No anxiety  Genitourinary:  No dysuria, no hematuria    Except as noted above the remainder of the review of systems was reviewed and negative.        PAST MEDICAL HISTORY     Past Medical History:   Diagnosis Date    Diabetes mellitus ([facility]     GERD (gastroesophageal reflux disease)     Hyperlipidemia     Parkinson disease ([facility] [facility]     Vertigo          SURGICAL HISTORY       Past Surgical History:   Procedure Laterality Date    BRAIN SURGERY  03/2018    DBS system     KNEE ARTHROPLASTY Left          CURRENT MEDICATIONS       Previous Medications    CARBIDOPA-LEVODOPA (SINEMET)  MG PER TABLET    Take 0.5 tablets by mouth 2 times daily    CHOLECALCIFEROL (VITAMIN D PO)    Take 1.25 mg by mouth Takes on sunday    CITALOPRAM (CELEXA) 20 MG TABLET    Take 20 mg by mouth daily    DONEPEZIL (ARICEPT) 10 MG TABLET    Take 10 mg by mouth nightly    GLIMEPIRIDE (AMARYL) 4 MG TABLET    Take 4 mg by mouth 2 times daily    INSULIN GLARGINE (TOUJEO SOLOSTAR SC)    Inject into the skin 45 units at night SQ    LISINOPRIL (PRINIVIL;ZESTRIL) 20 MG TABLET    Take 20 mg by mouth daily    METFORMIN (GLUCOPHAGE) 500 MG TABLET    Take 500 mg by mouth 2 times daily (with meals)    NONFORMULARY    nuedexta 1 capsule twice a day    ONDANSETRON (ZOFRAN ODT) 4 MG DISINTEGRATING TABLET    Take 1 tablet by mouth every 8 hours as needed for Nausea or Vomiting    PRAMIPEXOLE DIHYDROCHLORIDE ER (MIRAPEX ER) 4.5 MG TB24    Take by mouth daily    RANITIDINE (ZANTAC) 150 MG TABLET    Take 150 mg by mouth 2 times daily    ROSUVASTATIN (CRESTOR) 20 MG TABLET    Take 20 mg by mouth daily       ALLERGIES     Patient has no known allergies. FAMILY HISTORY     History reviewed. No pertinent family history.        SOCIAL HISTORY       Social History     Socioeconomic History    Marital status:      Spouse name: None    Number of children: None    Years of education: None    Highest education level: None   Occupational History    None   Tobacco Use    Smoking status: Never Smoker    Smokeless tobacco: Never Used   Substance and Sexual Activity    Alcohol use: No    Drug use: No    Sexual activity: None   Other Topics Concern    None   Social History Narrative    None     Social Determinants of Health     Financial Resource Strain:     Difficulty of Paying Living Expenses:    Food Insecurity:     Worried About Running Out of Food in the Last Year:    [address] in the Last Year:    Transportation Needs:     Lack of Transportation (Medical):  Lack of Transportation (Non-Medical):    Physical Activity:     Days of Exercise per Week:     Minutes of Exercise per Session:    Stress:     Feeling of Stress :    Social Connections:     Frequency of Communication with Friends and Family:     Frequency of Social Gatherings with Friends and Family:     Attends Anabaptism Services:     Active Member of Clubs or Organizations:     Attends Club or Organization Meetings:     Marital Status:    Intimate Partner Violence:     Fear of Current or Ex-Partner:     Emotionally Abused:     Physically Abused:     Sexually Abused:          PHYSICAL EXAM    (up to 7 for level 4, 8 or more for level 5)     ED Triage Vitals [10/26/21 0736]   BP Temp Temp Source Pulse Resp SpO2 Height Weight   115/62 98 °F (36.7 °C) Oral 71 18 96 % 5' 10\" (1.778 m) 215 lb (97.5 kg)       Physical Exam  General :Patient is awake, alert, oriented, in no acute distress, nontoxic appearing  HEENT: Pupils are equally round and reactive to light, EOMI, conjunctivae clear. Oral mucosa is moist, no exudate. Uvula is midline  Neck: Neck is supple, full range of motion, trachea midline  Cardiac: Heart regular rate, rhythm, no murmurs, rubs, or gallops  Lungs: Lungs are clear to auscultation, there is no wheezing, rhonchi, or rales. There is no use of accessory muscles. Chest wall: There is no tenderness to palpation over the chest wall or over ribs  Abdomen: Abdomen is soft, nontender, nondistended. There is no firm or pulsatile masses, no rebound rigidity or guarding. Musculoskeletal: 5 out of 5 strength in all 4 extremities. No focal muscle deficits are appreciated  Neuro:  Motor intact, sensory intact, level of consciousness is normal   No patient will evidence of incontinence or loss of bowel  no saddle anesthesia noted patient's rectal tone is decreased but intact  Dermatology: Skin is warm and dry  Psych: Mentation is grossly normal, cognition is grossly normal. Affect is appropriate. DIAGNOSTIC RESULTS     EKG: All EKG's are interpreted by the Emergency Department Physician who either signs or Co-signs this chart in the [address] a cardiologist.        RADIOLOGY:   Non-plain film images such as CT, Ultrasound and MRI are read by the radiologist. Bernadine Borja radiographic images are visualized and preliminarily interpreted by the emergency physician with the below findings:      ? Radiologist's Report Reviewed:  No orders to display         ED BEDSIDE ULTRASOUND:   Performed by ED Physician - none    LABS:    I have reviewed and interpreted all of the currently available lab results from this visit (ifapplicable):  Results for orders placed or performed during the hospital encounter of 10/26/21   Urinalysis Reflex to Culture    Specimen: Urine, clean catch   Result Value Ref Range    Color, UA Yellow Straw/Yellow    Clarity, UA Clear Clear    Glucose, Ur 500 (A) Negative mg/dL    Bilirubin Urine Negative Negative    Ketones, Urine Negative Negative mg/dL    Specific Gravity, UA 1.020 1.005 - 1.030    Blood, Urine Negative Negative    pH, UA 7.0 5.0 - 8.0    Protein, UA Negative Negative mg/dL    Urobilinogen, Urine 1.0 <2.0 E.U./dL    Nitrite, Urine Negative Negative    Leukocyte Esterase, Urine Negative Negative    Microscopic Examination Not Indicated     Urine Type Voided     Urine Reflex to Culture Not Indicated    POCT Glucose   Result Value Ref Range    Glucose 161 mg/dL    QC OK? y    POCT Glucose   Result Value Ref Range    POC Glucose 161 (H) 74 - 106 mg/dl    Performed on ACCU-CHEK         All other labs were within normal range or not returned as of this dictation.     EMERGENCY DEPARTMENT COURSE and DIFFERENTIAL DIAGNOSIS/MDM:   Vitals:    Vitals:    10/26/21 0745 10/26/21 0800 10/26/21 0815 10/26/21 0830   BP: (!) 102/57 (!) 106/58 114/61 113/72   Pulse: Resp:       Temp:       TempSrc:       SpO2:    96%   Weight:       Height:           MEDICATIONS ADMINISTERED IN ED:  Medications - No data to display    Patient has been stable he was able to void for us had minimal post void residual urine is negative for any type of infection this appears to be an neurologic issue probably related to his Parkinson's or possibly his diabetes will discharge to home with instructions to contact his Parkinson's doctor to see if he can get his appointment moved up. The patient will follow-up with their PCP in 1-2 days for reevaluation. If the patient or family members have anyfurther concerns or any worsening symptoms they will return to the ED for reevaluation. CONSULTS:  None    PROCEDURES:  Procedures    CRITICAL CARE TIME    Total Critical Care time was 0 minutes, excluding separately reportable procedures. There was a high probability of clinically significant/life threatening deterioration in the patient's condition which required my urgent intervention. FINAL IMPRESSION      1. Urinary incontinence, unspecified type New Problem         DISPOSITION/PLAN   DISPOSITION Decision To Discharge 10/26/2021 08:40:35 AM      PATIENT REFERRED TO:  Elisabeth Caicedo  [address] [place] [address]  [phone]    Schedule an appointment as soon as possible for a visit in 3 days        DISCHARGE MEDICATIONS:  New Prescriptions    No medications on file       Comment: Please note this report has been produced using speech recognition software and may contain errorsrelated to that system including errors in grammar, punctuation, and spelling, as well as words and phrases that may be inappropriate. If there are any questions or concerns please feel free to contact the dictating providerfor clarification.     Kalani Interiano MD  Attending Emergency Physician              Kalani Interiano MD  10/26/21 7671

## 2021-10-26 NOTE — ED NOTES
Patient states he hasn't been able to control his urine, \"sometimes it just leaks out\", denies N/V/D or abd pain. Dr Daniela Mazariegos at bedside.      Robert Maldonado RN  10/26/21 7043

## 2021-10-26 NOTE — ED NOTES
Straight cath completed with # 14, patient tolerated well. 60 ml urine returned. Dr Hosey Duane notified of results.      Estelle Dear, RN  10/26/21 3360

## 2021-10-26 NOTE — ED NOTES
Patient instructed that urine needed for ua, understanding verbalized. Urinal at bedside.      Molly Mcgowan RN  10/26/21 5876

## 2021-11-03 NOTE — TELEPHONE ENCOUNTER
PT CALLED REQUESTING WE SEND AN RX FOR A CGM IN TO [facility] IN [place], [place]. PLEASE AND THANK YOU

## 2021-11-24 ENCOUNTER — TELEPHONE (OUTPATIENT)
Dept: ENDOCRINOLOGY | Facility: CLINIC | Age: 66
End: 2021-11-24

## 2022-01-11 RX ORDER — PEN NEEDLE, DIABETIC 31 GX5/16"
NEEDLE, DISPOSABLE MISCELLANEOUS
Qty: 100 EACH | Refills: 1 | Status: SHIPPED | OUTPATIENT
Start: 2022-01-11 | End: 2022-08-24

## 2022-02-01 DIAGNOSIS — E11.65 UNCONTROLLED TYPE 2 DIABETES MELLITUS WITH HYPERGLYCEMIA: ICD-10-CM

## 2022-02-01 RX ORDER — DAPAGLIFLOZIN 5 MG/1
5 TABLET, FILM COATED ORAL DAILY
Qty: 30 TABLET | Refills: 5 | Status: SHIPPED | OUTPATIENT
Start: 2022-02-01 | End: 2022-02-11 | Stop reason: CLARIF

## 2022-02-11 NOTE — TELEPHONE ENCOUNTER
You can try sending in jardiance 25 mg per day but I suspect that with the first of the year he just has had to meet a deductible

## 2022-02-11 NOTE — TELEPHONE ENCOUNTER
REROUTING TO ENDO POOL    PATIENT STATES THAT HIS FARXIGA RX WILL COST $180 PER 30 DAYS AND WOULD LIKE TO KNOW IF THERE MIGHT BE A LESS EXPENSIVE ALTERNATIVE THAT CAN BE PRESCRIBED. THIS MEDICATION WAS $30 PER MONTH IN THE PAST. ARE THERE SAMPLES AVAILABLE?

## 2022-04-26 ENCOUNTER — HOSPITAL ENCOUNTER (OUTPATIENT)
Facility: HOSPITAL | Age: 67
Discharge: HOME | End: 2022-04-26
Payer: MEDICARE

## 2022-04-26 LAB
A/G RATIO: 1.7 (ref 0.8–2)
ALBUMIN SERPL-MCNC: 4.3 G/DL (ref 3.4–4.8)
ALP BLD-CCNC: 91 U/L (ref 25–100)
ALT SERPL-CCNC: 28 U/L (ref 4–36)
ANION GAP SERPL CALCULATED.3IONS-SCNC: 9 MMOL/L (ref 3–16)
AST SERPL-CCNC: 20 U/L (ref 8–33)
BASOPHILS ABSOLUTE: 0 K/UL (ref 0–0.1)
BASOPHILS RELATIVE PERCENT: 0.5 %
BILIRUB SERPL-MCNC: 0.6 MG/DL (ref 0.3–1.2)
BUN BLDV-MCNC: 19 MG/DL (ref 6–20)
CALCIUM SERPL-MCNC: 9.5 MG/DL (ref 8.5–10.5)
CHLORIDE BLD-SCNC: 99 MMOL/L (ref 98–107)
CHOLESTEROL, TOTAL: 78 MG/DL (ref 0–200)
CO2: 26 MMOL/L (ref 20–30)
CREAT SERPL-MCNC: 1.2 MG/DL (ref 0.4–1.2)
EOSINOPHILS ABSOLUTE: 0 K/UL (ref 0–0.4)
EOSINOPHILS RELATIVE PERCENT: 0.4 %
FOLATE: 9.49 NG/ML
GFR AFRICAN AMERICAN: >59
GFR NON-AFRICAN AMERICAN: >59
GLOBULIN: 2.6 G/DL
GLUCOSE BLD-MCNC: 236 MG/DL (ref 74–106)
HBA1C MFR BLD: 8.9 %
HCT VFR BLD CALC: 39.3 % (ref 40–54)
HDLC SERPL-MCNC: 31 MG/DL (ref 40–60)
HEMOGLOBIN: 13.5 G/DL (ref 13–18)
IMMATURE GRANULOCYTES #: 0 K/UL
IMMATURE GRANULOCYTES %: 0.3 % (ref 0–5)
LDL CHOLESTEROL CALCULATED: 8 MG/DL
LYMPHOCYTES ABSOLUTE: 1.6 K/UL (ref 1.5–4)
LYMPHOCYTES RELATIVE PERCENT: 21.1 %
MCH RBC QN AUTO: 31 PG (ref 27–32)
MCHC RBC AUTO-ENTMCNC: 34.4 G/DL (ref 31–35)
MCV RBC AUTO: 90.3 FL (ref 80–100)
MONOCYTES ABSOLUTE: 0.5 K/UL (ref 0.2–0.8)
MONOCYTES RELATIVE PERCENT: 6.8 %
NEUTROPHILS ABSOLUTE: 5.2 K/UL (ref 2–7.5)
NEUTROPHILS RELATIVE PERCENT: 70.9 %
PDW BLD-RTO: 13.3 % (ref 11–16)
PLATELET # BLD: 139 K/UL (ref 150–400)
PMV BLD AUTO: 10.9 FL (ref 6–10)
POTASSIUM SERPL-SCNC: 4.7 MMOL/L (ref 3.4–5.1)
RBC # BLD: 4.35 M/UL (ref 4.5–6)
SODIUM BLD-SCNC: 134 MMOL/L (ref 136–145)
TOTAL PROTEIN: 6.9 G/DL (ref 6.4–8.3)
TRIGL SERPL-MCNC: 197 MG/DL (ref 0–249)
TSH SERPL DL<=0.05 MIU/L-ACNC: 2.15 UIU/ML (ref 0.27–4.2)
VITAMIN B-12: 520 PG/ML (ref 211–911)
VLDLC SERPL CALC-MCNC: 39 MG/DL
WBC # BLD: 7.4 K/UL (ref 4–11)

## 2022-04-26 PROCEDURE — 80061 LIPID PANEL: CPT

## 2022-04-26 PROCEDURE — 83036 HEMOGLOBIN GLYCOSYLATED A1C: CPT

## 2022-04-26 PROCEDURE — 80053 COMPREHEN METABOLIC PANEL: CPT

## 2022-04-26 PROCEDURE — 84443 ASSAY THYROID STIM HORMONE: CPT

## 2022-04-26 PROCEDURE — 85025 COMPLETE CBC W/AUTO DIFF WBC: CPT

## 2022-04-26 PROCEDURE — 82607 VITAMIN B-12: CPT

## 2022-04-26 PROCEDURE — 82746 ASSAY OF FOLIC ACID SERUM: CPT

## 2022-04-26 PROCEDURE — 36415 COLL VENOUS BLD VENIPUNCTURE: CPT

## 2022-04-27 ENCOUNTER — OFFICE VISIT (OUTPATIENT)
Dept: ENDOCRINOLOGY | Facility: CLINIC | Age: 67
End: 2022-04-27

## 2022-04-27 VITALS
SYSTOLIC BLOOD PRESSURE: 126 MMHG | HEART RATE: 64 BPM | RESPIRATION RATE: 18 BRPM | OXYGEN SATURATION: 96 % | BODY MASS INDEX: 30.24 KG/M2 | WEIGHT: 211.2 LBS | DIASTOLIC BLOOD PRESSURE: 66 MMHG | HEIGHT: 70 IN | TEMPERATURE: 97.5 F

## 2022-04-27 DIAGNOSIS — E11.65 UNCONTROLLED TYPE 2 DIABETES MELLITUS WITH HYPERGLYCEMIA: Primary | ICD-10-CM

## 2022-04-27 PROCEDURE — 99213 OFFICE O/P EST LOW 20 MIN: CPT | Performed by: INTERNAL MEDICINE

## 2022-04-27 RX ORDER — DONEPEZIL HYDROCHLORIDE 23 MG/1
23 TABLET, FILM COATED ORAL DAILY
COMMUNITY
Start: 2022-01-26 | End: 2023-01-25 | Stop reason: SDUPTHER

## 2022-04-27 RX ORDER — REPAGLINIDE 1 MG/1
1 TABLET ORAL
Qty: 90 TABLET | Refills: 5 | Status: SHIPPED | OUTPATIENT
Start: 2022-04-27 | End: 2022-12-30

## 2022-04-27 RX ORDER — DAPAGLIFLOZIN 5 MG/1
5 TABLET, FILM COATED ORAL DAILY
COMMUNITY
Start: 2022-03-11 | End: 2022-04-27 | Stop reason: ALTCHOICE

## 2022-04-27 RX ORDER — CITALOPRAM 40 MG/1
40 TABLET ORAL DAILY
COMMUNITY
Start: 2022-03-28

## 2022-04-27 RX ORDER — PRAMIPEXOLE 1.5 MG/1
1.5 TABLET, EXTENDED RELEASE ORAL DAILY
COMMUNITY
Start: 2022-04-25

## 2022-04-27 NOTE — PROGRESS NOTES
"     Office Note      Date: 2022  Patient Name: Vadim Mckeon  MRN: [id number]  : 1955    Chief Complaint   Patient presents with   • Diabetes     6 month f/u       History of Present Illness:   Vadim Mckeon is a 66 y.o. male who presents for Diabetes on insulin and metformin . Cost of sglt2 is prohibitive.  Uses deni usually to check bg but out for now     Last A1c:  Hemoglobin A1C   Date Value Ref Range Status   2022 8.9 (H) See comment % Final     Comment:     Comment:  Diagnosis of Diabetes: > or = 6.5%  Increased risk of diabetes (Prediabetes): 5.7-6.4%  Glycemic Control: Nonpregnant Adults: <7.0%                    Pregnant: <6.0%       Changes in health since last visit: none . Last eye exam up to date .    Subjective          Review of Systems:   Review of Systems   Constitutional: Negative.    HENT: Negative.    Eyes: Negative.    Respiratory: Negative.        The following portions of the patient's history were reviewed and updated as appropriate: allergies, current medications, past family history, past medical history, past social history, past surgical history and problem list.    Objective     Visit Vitals  /66 (BP Location: Left arm, Patient Position: Sitting, Cuff Size: Adult)   Pulse 64   Temp 97.5 °F (36.4 °C) (Infrared)   Resp 18   Ht 177.8 cm (70\")   Wt 95.8 kg (211 lb 3.2 oz)   SpO2 96%   BMI 30.30 kg/m²       Labs:    CMP  No results found for: GLUCOSE, BUN, CREATININE, EGFRIFNONA, EGFRIFAFRI, BCR, K, CO2, CALCIUM, PROTENTOTREF, LABIL2, BILIRUBIN, AST, ALT     CBC w/DIFF  Lab Results   Component Value Date    WBC 7.4 2022    RBC 4.35 (L) 2022    HGB 13.5 2022    HCT 39.3 (L) 2022    MCV 90.3 2022    MCH 31.0 2022    MCHC 34.4 2022    RDW 13.3 2022    MPV 10.9 (H) 2022     (L) 2022    NEUTRORELPCT 70.9 2022    LYMPHORELPCT 21.1 2022    MONORELPCT 6.8 2022    EOSRELPCT " 0.4 04/26/2022    BASORELPCT 0.5 04/26/2022    AUTOIGPER 0.3 04/26/2022    NEUTROABS 5.2 04/26/2022    LYMPHSABS 1.6 04/26/2022    MONOSABS 0.5 04/26/2022    EOSABS 0.0 04/26/2022    BASOSABS 0.0 04/26/2022    AUTOIGNUM 0.0 04/26/2022       Physical Exam:  Physical Exam  Vitals reviewed.   Constitutional:       Appearance: Normal appearance.   Neurological:      Mental Status: He is alert.   Psychiatric:         Mood and Affect: Mood normal.         Thought Content: Thought content normal.         Judgment: Judgment normal.          Assessment / Plan      Assessment & Plan:  Problem List Items Addressed This Visit        Other    Uncontrolled type 2 diabetes mellitus with hyperglycemia (HCC) - Primary    Current Assessment & Plan     Diabetes is unchanged.   will increase insulin to 44 units and add starlix/prandin at meals  Diabetes will be reassessed in 6 months.           Relevant Medications    metFORMIN (GLUCOPHAGE) 500 MG tablet    Insulin Glargine, 1 Unit Dial, (TOUJEO) 300 UNIT/ML solution pen-injector injection    repaglinide (Prandin) 1 MG tablet           Matthew Bills MD   04/27/2022

## 2022-04-27 NOTE — ASSESSMENT & PLAN NOTE
Diabetes is unchanged.   will increase insulin to 44 units and add starlix/prandin at meals  Diabetes will be reassessed in 6 months.

## 2022-05-23 ENCOUNTER — HOSPITAL ENCOUNTER (OUTPATIENT)
Facility: HOSPITAL | Age: 67
Discharge: HOME | End: 2022-05-23
Payer: MEDICARE

## 2022-05-23 PROCEDURE — 87086 URINE CULTURE/COLONY COUNT: CPT

## 2022-05-25 ENCOUNTER — HOSPITAL ENCOUNTER (OUTPATIENT)
Dept: ULTRASOUND IMAGING | Facility: HOSPITAL | Age: 67
Discharge: HOME | End: 2022-05-25
Payer: MEDICARE

## 2022-05-25 DIAGNOSIS — N50.82 SCROTAL PAIN: ICD-10-CM

## 2022-05-25 DIAGNOSIS — N49.2 ABSCESS OF SCROTUM: ICD-10-CM

## 2022-05-25 LAB — URINE CULTURE, ROUTINE: NORMAL

## 2022-05-25 PROCEDURE — 76870 US EXAM SCROTUM: CPT

## 2022-05-26 ENCOUNTER — OFFICE VISIT (OUTPATIENT)
Dept: UROLOGY | Facility: CLINIC | Age: 67
End: 2022-05-26

## 2022-05-26 VITALS
HEIGHT: 70 IN | RESPIRATION RATE: 19 BRPM | HEART RATE: 78 BPM | OXYGEN SATURATION: 97 % | DIASTOLIC BLOOD PRESSURE: 70 MMHG | BODY MASS INDEX: 30.21 KG/M2 | WEIGHT: 211 LBS | SYSTOLIC BLOOD PRESSURE: 124 MMHG

## 2022-05-26 DIAGNOSIS — N43.3 HYDROCELE, UNSPECIFIED HYDROCELE TYPE: Primary | ICD-10-CM

## 2022-05-26 LAB
BILIRUB BLD-MCNC: NEGATIVE MG/DL
CLARITY, POC: CLEAR
COLOR UR: YELLOW
EXPIRATION DATE: ABNORMAL
GLUCOSE UR STRIP-MCNC: ABNORMAL MG/DL
KETONES UR QL: NEGATIVE
LEUKOCYTE EST, POC: NEGATIVE
Lab: ABNORMAL
NITRITE UR-MCNC: NEGATIVE MG/ML
PH UR: 6 [PH] (ref 5–8)
PROT UR STRIP-MCNC: ABNORMAL MG/DL
RBC # UR STRIP: NEGATIVE /UL
SP GR UR: 1.02 (ref 1–1.03)
UROBILINOGEN UR QL: NORMAL

## 2022-05-26 PROCEDURE — 99203 OFFICE O/P NEW LOW 30 MIN: CPT | Performed by: UROLOGY

## 2022-05-26 PROCEDURE — 81003 URINALYSIS AUTO W/O SCOPE: CPT | Performed by: UROLOGY

## 2022-05-26 RX ORDER — CIPROFLOXACIN 500 MG/1
500 TABLET, FILM COATED ORAL 2 TIMES DAILY
COMMUNITY
Start: 2022-05-23 | End: 2022-09-14

## 2022-05-26 RX ORDER — CIPROFLOXACIN 500 MG/1
500 TABLET, FILM COATED ORAL 2 TIMES DAILY
Qty: 6 TABLET | Refills: 0 | Status: SHIPPED | OUTPATIENT
Start: 2022-05-26 | End: 2023-01-25

## 2022-05-26 RX ORDER — DONEPEZIL HYDROCHLORIDE 23 MG/1
1 TABLET, FILM COATED ORAL
COMMUNITY
Start: 2022-05-23

## 2022-05-26 NOTE — PROGRESS NOTES
Chief Complaint  Chief Complaint   Patient presents with   • Consult     hydrocele        Referring Provider:  No ref. provider found    HPI  Mr. Mckeon is a 66 y.o. male with below past medical history who presents with left scrotal swelling.    Duration 1 week, happened suddenly. No fevers though or pain. Hx of parkinsons x 12 years. Poorly controlled DM.     Past Medical History  Past Medical History:   Diagnosis Date   • Hypercholesterolemia    • Hyperlipidemia    • Hypertension    • Movement disorder    • APOLINAR (obstructive sleep apnea)    • Parkinson's disease (HCC)    • Sprained ankle     History of   • Type II diabetes mellitus, uncontrolled        Past Surgical History  Past Surgical History:   Procedure Laterality Date   • KNEE ARTHROSCOPY W/ MENISCAL REPAIR         Medications    Current Outpatient Medications:   •  Armodafinil 250 MG tablet, Take 1 tablet by mouth Daily., Disp: 30 tablet, Rfl: 3  •  bisacodyl (bisacodyl) 5 MG EC tablet, Take 2 at 3pm and 2 at 7pm the day prior to colonoscopy., Disp: 4 tablet, Rfl: 0  •  carbidopa-levodopa (SINEMET)  MG per tablet, 4 (Four) Times a Day., Disp: , Rfl:   •  ciprofloxacin (CIPRO) 500 MG tablet, Take 500 mg by mouth 2 (Two) Times a Day., Disp: , Rfl:   •  citalopram (CeleXA) 40 MG tablet, Take 40 mg by mouth Daily., Disp: , Rfl:   •  clonazePAM (KlonoPIN) 1 MG tablet, , Disp: , Rfl:   •  colestipol (COLESTID) 1 g tablet, Take 1 g by mouth 2 (Two) Times a Day., Disp: , Rfl:   •  Continuous Blood Gluc  (FreeStyle Neda 2 [place]) device, 1 Device See Admin Instructions., Disp: 1 each, Rfl: 0  •  Continuous Blood Gluc Sensor (FreeStyle Neda 2 Sensor) misc, 1 Device Every 14 (Fourteen) Days., Disp: 2 each, Rfl: 5  •  cyanocobalamin 1000 MCG/ML injection, Inject 1 ml IM every 2 weeks for 2 doses, THEN ONCE monthly], Disp: , Rfl:   •  donepezil (ARICEPT) 23 MG tablet, Take 23 mg by mouth Daily., Disp: , Rfl:   •  donepezil (ARICEPT) 23 MG tablet, Take  1 tablet by mouth every night at bedtime., Disp: , Rfl:   •  Insulin Glargine, 1 Unit Dial, (TOUJEO) 300 UNIT/ML solution pen-injector injection, Inject 80 Units under the skin into the appropriate area as directed Daily., Disp: 8 pen, Rfl: 5  •  lisinopril (PRINIVIL,ZESTRIL) 20 MG tablet, Take 20 mg by mouth., Disp: , Rfl:   •  metFORMIN (GLUCOPHAGE) 500 MG tablet, Take 1 tablet by mouth 2 (Two) Times a Day With Meals., Disp: 60 tablet, Rfl: 5  •  Nuedexta 20-10 MG capsule capsule, , Disp: , Rfl:   •  Omega-3 Fatty Acids (FISH OIL TRIPLE STRENGTH) 1400 MG capsule, Take 1 capsule by mouth daily., Disp: , Rfl:   •  omeprazole (priLOSEC) 40 MG capsule, , Disp: , Rfl:   •  polyethylene glycol (MIRALAX) 17 GM/SCOOP powder, Mix 238g powder with 64 oz of clear liquid. Starting at 5pm drink 80z every 10-15 minutes until consumed., Disp: 238 g, Rfl: 0  •  Pramipexole Dihydrochloride ER 1.5 MG tablet sustained-release 24 hour, Take 1.5 mg by mouth Daily., Disp: , Rfl:   •  ranitidine (ZANTAC) 150 MG tablet, Take 150 mg by mouth 2 (two) times a day., Disp: , Rfl:   •  repaglinide (Prandin) 1 MG tablet, Take 1 tablet by mouth 3 (Three) Times a Day Before Meals., Disp: 90 tablet, Rfl: 5  •  rosuvastatin (CRESTOR) 20 MG tablet, Take 20 mg by mouth., Disp: , Rfl:   •  sildenafil (VIAGRA) 50 MG tablet, 1-2 po prn sexual acivity, Disp: 6 tablet, Rfl: 3  •  triamcinolone (KENALOG) 0.5 % cream, apply TO TO RASH AREA TWO TO THREE times daily, Disp: , Rfl:   •  True Metrix Blood Glucose Test test strip, TEST BLOOD SUGAR TWICE A DAY, Disp: 100 each, Rfl: 11  •  TRUEplus Pen Needles 31G X 6 MM misc, USE ONE EVERY DAY, Disp: , Rfl:   •  Unifine Pentips 31G X 8 MM misc, USE ONE EVERY DAY, Disp: 100 each, Rfl: 1  •  vitamin B-12 (CYANOCOBALAMIN) 1000 MCG tablet, Take 1,000 mcg by mouth Daily., Disp: , Rfl:   •  vitamin D (ERGOCALCIFEROL) 42615 units capsule capsule, TAKE 1 CAPSULE BY MOUTH ONCE WEEKLY, Disp: 4 capsule, Rfl: 12  •  VITAMIN  "D, CHOLECALCIFEROL, PO, Take 1.25 mg by mouth., Disp: , Rfl:     Allergies  No Known Allergies    Social History  Social History     Socioeconomic History   • Marital status:    Tobacco Use   • Smoking status: Never Smoker   • Smokeless tobacco: Never Used   Vaping Use   • Vaping Use: Never used   Substance and Sexual Activity   • Alcohol use: Yes     Comment: 2 drinks/month   • Drug use: No   • Sexual activity: Defer       Family History  Family History   Problem Relation Age of Onset   • Other Other         myocardial infarction   • Cancer Father    • Heart attack Father    • Cancer Mother    • Diabetes Maternal Aunt    • Diabetes Maternal Uncle    • Cancer Brother      Physical Exam  Visit Vitals  /70   Pulse 78   Resp 19   Ht 177.8 cm (70\")   Wt 95.7 kg (211 lb)   SpO2 97%   BMI 30.28 kg/m²     Physical exam was performed and was notable for normal habitus    Genitourinary  Penis: circumcised penis, glans normal, no penile discharge.  No rashes/lesions.    Testes: descended bilaterally, left testicle swollen but not tender. Hard. No erythema      Labs  Lab Results   Component Value Date    PSA 1.46 06/03/2021    PSA 1.44 12/04/2018       No results found for: GLUCOSE, CALCIUM, NA, K, CO2, CL, BUN, CREATININE, EGFRIFAFRI, EGFRIFNONA, BCR, ANIONGAP    Lab Results   Component Value Date    WBC 7.4 04/26/2022    HGB 13.5 04/26/2022    HCT 39.3 (L) 04/26/2022    MCV 90.3 04/26/2022     (L) 04/26/2022       Brief Urine Lab Results  (Last result in the past 365 days)      Color   Clarity   Blood   Leuk Est   Nitrite   Protein   CREAT   Urine HCG        05/26/22 1537 Yellow   Clear   Negative   Negative   Negative   Trace                  Radiologic Studies  US SCROTUM AND TESTICLES  Result Date: 5/25/2022  1. Enlarged left epididymis with hyperemia on color Doppler images. Correlate for an epididymitis. Recommend sonographic follow-up to confirm resolution. 2. Moderate size left-sided hydrocele with " endoscopic by multiple thin septations. In setting of infection this may represent a pyocele. 3. No sonographic evidence of any testicular torsion, testicular mass or orchitis. 4. Simple 0.8 cm right epididymal cyst. 5. Simple small right-sided hydrocele.        Assessment  Mr. Mckeon is a 66 y.o. male who presents with likely mild epididymitis w/ reactive hydrocele.    Plan  1.  Continue cipro for total of 2 weeks.  I added a few pills for full treatment course.  2. FU in 2 mo to see if swelling resolved      Mauro Cole MD

## 2022-07-25 ENCOUNTER — OFFICE VISIT (OUTPATIENT)
Dept: UROLOGY | Facility: CLINIC | Age: 67
End: 2022-07-25

## 2022-07-25 ENCOUNTER — HOSPITAL ENCOUNTER (OUTPATIENT)
Facility: HOSPITAL | Age: 67
Discharge: HOME | End: 2022-07-25
Payer: MEDICARE

## 2022-07-25 VITALS
BODY MASS INDEX: 30.21 KG/M2 | HEIGHT: 70 IN | SYSTOLIC BLOOD PRESSURE: 142 MMHG | TEMPERATURE: 96.8 F | DIASTOLIC BLOOD PRESSURE: 76 MMHG | WEIGHT: 211 LBS | OXYGEN SATURATION: 98 % | HEART RATE: 75 BPM

## 2022-07-25 DIAGNOSIS — N50.82 SCROTAL PAIN: Primary | ICD-10-CM

## 2022-07-25 DIAGNOSIS — N52.9 ERECTILE DYSFUNCTION, UNSPECIFIED ERECTILE DYSFUNCTION TYPE: ICD-10-CM

## 2022-07-25 LAB
A/G RATIO: 2 (ref 0.8–2)
ALBUMIN SERPL-MCNC: 4.4 G/DL (ref 3.4–4.8)
ALP BLD-CCNC: 88 U/L (ref 25–100)
ALT SERPL-CCNC: 18 U/L (ref 4–36)
ANION GAP SERPL CALCULATED.3IONS-SCNC: 8 MMOL/L (ref 3–16)
AST SERPL-CCNC: 24 U/L (ref 8–33)
BASOPHILS ABSOLUTE: 0.1 K/UL (ref 0–0.1)
BASOPHILS RELATIVE PERCENT: 0.7 %
BILIRUB BLD-MCNC: NEGATIVE MG/DL
BILIRUB SERPL-MCNC: 0.5 MG/DL (ref 0.3–1.2)
BUN BLDV-MCNC: 17 MG/DL (ref 6–20)
CALCIUM SERPL-MCNC: 8.9 MG/DL (ref 8.5–10.5)
CHLORIDE BLD-SCNC: 105 MMOL/L (ref 98–107)
CHOLESTEROL, TOTAL: 89 MG/DL (ref 0–200)
CLARITY, POC: CLEAR
CO2: 26 MMOL/L (ref 20–30)
COLOR UR: YELLOW
CREAT SERPL-MCNC: 1.1 MG/DL (ref 0.4–1.2)
EOSINOPHILS ABSOLUTE: 0 K/UL (ref 0–0.4)
EOSINOPHILS RELATIVE PERCENT: 0.6 %
EXPIRATION DATE: ABNORMAL
FOLATE: 10.54 NG/ML
GFR AFRICAN AMERICAN: >59
GFR NON-AFRICAN AMERICAN: >59
GLOBULIN: 2.2 G/DL
GLUCOSE BLD-MCNC: 153 MG/DL (ref 74–106)
GLUCOSE UR STRIP-MCNC: ABNORMAL MG/DL
HBA1C MFR BLD: 9.1 %
HCT VFR BLD CALC: 40.3 % (ref 40–54)
HDLC SERPL-MCNC: 30 MG/DL (ref 40–60)
HEMOGLOBIN: 13.5 G/DL (ref 13–18)
IMMATURE GRANULOCYTES #: 0 K/UL
IMMATURE GRANULOCYTES %: 0.3 % (ref 0–5)
KETONES UR QL: NEGATIVE
LDL CHOLESTEROL CALCULATED: 18 MG/DL
LEUKOCYTE EST, POC: NEGATIVE
LYMPHOCYTES ABSOLUTE: 1.5 K/UL (ref 1.5–4)
LYMPHOCYTES RELATIVE PERCENT: 21.6 %
Lab: ABNORMAL
MCH RBC QN AUTO: 30.3 PG (ref 27–32)
MCHC RBC AUTO-ENTMCNC: 33.5 G/DL (ref 31–35)
MCV RBC AUTO: 90.6 FL (ref 80–100)
MONOCYTES ABSOLUTE: 0.4 K/UL (ref 0.2–0.8)
MONOCYTES RELATIVE PERCENT: 6 %
NEUTROPHILS ABSOLUTE: 4.7 K/UL (ref 2–7.5)
NEUTROPHILS RELATIVE PERCENT: 70.8 %
NITRITE UR-MCNC: NEGATIVE MG/ML
PDW BLD-RTO: 13.2 % (ref 11–16)
PH UR: 6.5 [PH] (ref 5–8)
PLATELET # BLD: 148 K/UL (ref 150–400)
PMV BLD AUTO: 10.8 FL (ref 6–10)
POTASSIUM SERPL-SCNC: 4.7 MMOL/L (ref 3.4–5.1)
PROSTATE SPECIFIC ANTIGEN: 2.28 NG/ML (ref 0–4)
PROT UR STRIP-MCNC: NEGATIVE MG/DL
RBC # BLD: 4.45 M/UL (ref 4.5–6)
RBC # UR STRIP: NEGATIVE /UL
SODIUM BLD-SCNC: 139 MMOL/L (ref 136–145)
SP GR UR: 1.01 (ref 1–1.03)
TOTAL PROTEIN: 6.6 G/DL (ref 6.4–8.3)
TRIGL SERPL-MCNC: 205 MG/DL (ref 0–249)
TSH SERPL DL<=0.05 MIU/L-ACNC: 4.68 UIU/ML (ref 0.27–4.2)
UROBILINOGEN UR QL: NORMAL
VITAMIN B-12: 419 PG/ML (ref 211–911)
VLDLC SERPL CALC-MCNC: 41 MG/DL
WBC # BLD: 6.7 K/UL (ref 4–11)

## 2022-07-25 PROCEDURE — 84153 ASSAY OF PSA TOTAL: CPT

## 2022-07-25 PROCEDURE — 36415 COLL VENOUS BLD VENIPUNCTURE: CPT

## 2022-07-25 PROCEDURE — 82607 VITAMIN B-12: CPT

## 2022-07-25 PROCEDURE — 99214 OFFICE O/P EST MOD 30 MIN: CPT | Performed by: UROLOGY

## 2022-07-25 PROCEDURE — 85025 COMPLETE CBC W/AUTO DIFF WBC: CPT

## 2022-07-25 PROCEDURE — 83036 HEMOGLOBIN GLYCOSYLATED A1C: CPT

## 2022-07-25 PROCEDURE — 84443 ASSAY THYROID STIM HORMONE: CPT

## 2022-07-25 PROCEDURE — 82746 ASSAY OF FOLIC ACID SERUM: CPT

## 2022-07-25 PROCEDURE — 80061 LIPID PANEL: CPT

## 2022-07-25 PROCEDURE — 81003 URINALYSIS AUTO W/O SCOPE: CPT | Performed by: UROLOGY

## 2022-07-25 PROCEDURE — 80053 COMPREHEN METABOLIC PANEL: CPT

## 2022-07-25 RX ORDER — TADALAFIL 5 MG/1
5 TABLET ORAL DAILY
Qty: 30 TABLET | Refills: 11 | Status: SHIPPED | OUTPATIENT
Start: 2022-07-25 | End: 2022-10-24

## 2022-07-25 NOTE — PROGRESS NOTES
Chief Complaint   Patient presents with   • Follow-up     8 week follow up/scrotal exam.         HPI  Ms. Mckeon is a 66 y.o. male with history below in assessment, who presents for follow up.     At this visit patient states that the swelling in his scrotum and pain have all resolved.  New complaint for today is worsening of chronic erectile dysfunction.  Viagra has not worked well for him in the past.    Past Medical History:   Diagnosis Date   • Hypercholesterolemia    • Hyperlipidemia    • Hypertension    • Movement disorder    • APOLINAR (obstructive sleep apnea)    • Parkinson's disease (HCC)    • Sprained ankle     History of   • Type II diabetes mellitus, uncontrolled        Past Surgical History:   Procedure Laterality Date   • KNEE ARTHROSCOPY W/ MENISCAL REPAIR           Current Outpatient Medications:   •  carbidopa-levodopa (SINEMET)  MG per tablet, 4 (Four) Times a Day., Disp: , Rfl:   •  citalopram (CeleXA) 40 MG tablet, Take 40 mg by mouth Daily., Disp: , Rfl:   •  clonazePAM (KlonoPIN) 1 MG tablet, , Disp: , Rfl:   •  colestipol (COLESTID) 1 g tablet, Take 1 g by mouth 2 (Two) Times a Day., Disp: , Rfl:   •  Continuous Blood Gluc  (FreeStyle Neda 2 [place]) device, 1 Device See Admin Instructions., Disp: 1 each, Rfl: 0  •  Continuous Blood Gluc Sensor (FreeStyle Neda 2 Sensor) misc, 1 Device Every 14 (Fourteen) Days., Disp: 2 each, Rfl: 5  •  donepezil (ARICEPT) 23 MG tablet, Take 23 mg by mouth Daily., Disp: , Rfl:   •  Insulin Glargine, 1 Unit Dial, (TOUJEO) 300 UNIT/ML solution pen-injector injection, Inject 80 Units under the skin into the appropriate area as directed Daily., Disp: 8 pen, Rfl: 5  •  lisinopril (PRINIVIL,ZESTRIL) 20 MG tablet, Take 20 mg by mouth., Disp: , Rfl:   •  metFORMIN (GLUCOPHAGE) 500 MG tablet, Take 1 tablet by mouth 2 (Two) Times a Day With Meals., Disp: 60 tablet, Rfl: 5  •  Nuedexta 20-10 MG capsule capsule, , Disp: , Rfl:   •  Pramipexole Dihydrochloride  ER 1.5 MG tablet sustained-release 24 hour, Take 1.5 mg by mouth Daily., Disp: , Rfl:   •  repaglinide (Prandin) 1 MG tablet, Take 1 tablet by mouth 3 (Three) Times a Day Before Meals., Disp: 90 tablet, Rfl: 5  •  rosuvastatin (CRESTOR) 20 MG tablet, Take 20 mg by mouth., Disp: , Rfl:   •  sildenafil (VIAGRA) 50 MG tablet, 1-2 po prn sexual acivity, Disp: 6 tablet, Rfl: 3  •  triamcinolone (KENALOG) 0.5 % cream, apply TO TO RASH AREA TWO TO THREE times daily, Disp: , Rfl:   •  True Metrix Blood Glucose Test test strip, TEST BLOOD SUGAR TWICE A DAY, Disp: 100 each, Rfl: 11  •  TRUEplus Pen Needles 31G X 6 MM misc, USE ONE EVERY DAY, Disp: , Rfl:   •  Unifine Pentips 31G X 8 MM misc, USE ONE EVERY DAY, Disp: 100 each, Rfl: 1  •  vitamin D (ERGOCALCIFEROL) 13398 units capsule capsule, TAKE 1 CAPSULE BY MOUTH ONCE WEEKLY, Disp: 4 capsule, Rfl: 12  •  Armodafinil 250 MG tablet, Take 1 tablet by mouth Daily., Disp: 30 tablet, Rfl: 3  •  bisacodyl (bisacodyl) 5 MG EC tablet, Take 2 at 3pm and 2 at 7pm the day prior to colonoscopy., Disp: 4 tablet, Rfl: 0  •  ciprofloxacin (CIPRO) 500 MG tablet, Take 500 mg by mouth 2 (Two) Times a Day., Disp: , Rfl:   •  ciprofloxacin (Cipro) 500 MG tablet, Take 1 tablet by mouth 2 (Two) Times a Day., Disp: 6 tablet, Rfl: 0  •  cyanocobalamin 1000 MCG/ML injection, Inject 1 ml IM every 2 weeks for 2 doses, THEN ONCE monthly], Disp: , Rfl:   •  donepezil (ARICEPT) 23 MG tablet, Take 1 tablet by mouth every night at bedtime., Disp: , Rfl:   •  Omega-3 Fatty Acids (FISH OIL TRIPLE STRENGTH) 1400 MG capsule, Take 1 capsule by mouth daily., Disp: , Rfl:   •  omeprazole (priLOSEC) 40 MG capsule, , Disp: , Rfl:   •  polyethylene glycol (MIRALAX) 17 GM/SCOOP powder, Mix 238g powder with 64 oz of clear liquid. Starting at 5pm drink 80z every 10-15 minutes until consumed., Disp: 238 g, Rfl: 0  •  ranitidine (ZANTAC) 150 MG tablet, Take 150 mg by mouth 2 (two) times a day., Disp: , Rfl:   •  vitamin  "B-12 (CYANOCOBALAMIN) 1000 MCG tablet, Take 1,000 mcg by mouth Daily., Disp: , Rfl:   •  VITAMIN D, CHOLECALCIFEROL, PO, Take 1.25 mg by mouth., Disp: , Rfl:      Physical Exam  Visit Vitals  /76 (BP Location: Left arm, Patient Position: Sitting, Cuff Size: Adult)   Pulse 75   Temp 96.8 °F (36 °C) (Temporal)   Ht 177.8 cm (70\")   Wt 95.7 kg (211 lb)   SpO2 98%   BMI 30.28 kg/m²       Labs  Brief Urine Lab Results  (Last result in the past 365 days)      Color   Clarity   Blood   Leuk Est   Nitrite   Protein   CREAT   Urine HCG        07/25/22 1216 Yellow   Clear   Negative   Negative   Negative   Negative                 No results found for: GLUCOSE, CALCIUM, NA, K, CO2, CL, BUN, CREATININE, EGFRIFAFRI, EGFRIFNONA, BCR, ANIONGAP    Lab Results   Component Value Date    WBC 7.4 04/26/2022    HGB 13.5 04/26/2022    HCT 39.3 (L) 04/26/2022    MCV 90.3 04/26/2022     (L) 04/26/2022            Lab Results   Component Value Date    PSA 1.46 06/03/2021    PSA 1.44 12/04/2018     Lab Results   Component Value Date    HGBA1C 8.9 (H) 04/26/2022             Radiographic Studies  US SCROTUM AND TESTICLES  Result Date: 5/25/2022  1. Enlarged left epididymis with hyperemia on color Doppler images. Correlate for an epididymitis. Recommend sonographic follow-up to confirm resolution. 2. Moderate size left-sided hydrocele with endoscopic by multiple thin septations. In setting of infection this may represent a pyocele. 3. No sonographic evidence of any testicular torsion, testicular mass or orchitis. 4. Simple 0.8 cm right epididymal cyst. 5. Simple small right-sided hydrocele.      I have reviewed above labs and imaging.     Assessment  66 y.o. male with poorly controlled DM, ED, epididymitis, which has resolved after 2 weeks Abx.     He now presents with worsening of chronic erectile dysfunction.  His diabetes is poorly controlled.  The ED has been refractory to 1 oral phosphodiesterase inhibitor.  After careful " discussion, he would like to try a different phosphodiesterase inhibitor prior to considering intracavernosal injections, penile prosthesis, or other treatments.    Plan  1.  Daily Cialis 5 mg prescribed.  2.  Follow-up in 3 months

## 2022-08-18 DIAGNOSIS — E11.8 TYPE 2 DIABETES MELLITUS WITH UNSPECIFIED COMPLICATIONS: ICD-10-CM

## 2022-08-19 RX ORDER — INSULIN GLARGINE 300 U/ML
INJECTION, SOLUTION SUBCUTANEOUS
Qty: 9 ML | Refills: 5 | Status: SHIPPED | OUTPATIENT
Start: 2022-08-19

## 2022-08-24 RX ORDER — PEN NEEDLE, DIABETIC 31 GX5/16"
NEEDLE, DISPOSABLE MISCELLANEOUS
Qty: 100 EACH | Refills: 3 | Status: SHIPPED | OUTPATIENT
Start: 2022-08-24 | End: 2023-03-14 | Stop reason: SDUPTHER

## 2022-09-14 ENCOUNTER — OFFICE VISIT (OUTPATIENT)
Dept: ENDOCRINOLOGY | Facility: CLINIC | Age: 67
End: 2022-09-14

## 2022-09-14 VITALS
DIASTOLIC BLOOD PRESSURE: 64 MMHG | SYSTOLIC BLOOD PRESSURE: 118 MMHG | WEIGHT: 211 LBS | HEIGHT: 70 IN | OXYGEN SATURATION: 97 % | BODY MASS INDEX: 30.21 KG/M2 | HEART RATE: 63 BPM

## 2022-09-14 DIAGNOSIS — E11.65 UNCONTROLLED TYPE 2 DIABETES MELLITUS WITH HYPERGLYCEMIA: ICD-10-CM

## 2022-09-14 DIAGNOSIS — E11.8 TYPE 2 DIABETES MELLITUS WITH UNSPECIFIED COMPLICATIONS: Primary | ICD-10-CM

## 2022-09-14 LAB
EXPIRATION DATE: NORMAL
EXPIRATION DATE: NORMAL
GLUCOSE BLDC GLUCOMTR-MCNC: 113 MG/DL (ref 70–130)
HBA1C MFR BLD: 7.8 %
Lab: NORMAL
Lab: NORMAL

## 2022-09-14 PROCEDURE — 3051F HG A1C>EQUAL 7.0%<8.0%: CPT | Performed by: INTERNAL MEDICINE

## 2022-09-14 PROCEDURE — 83036 HEMOGLOBIN GLYCOSYLATED A1C: CPT | Performed by: INTERNAL MEDICINE

## 2022-09-14 PROCEDURE — 99214 OFFICE O/P EST MOD 30 MIN: CPT | Performed by: INTERNAL MEDICINE

## 2022-09-14 PROCEDURE — 82947 ASSAY GLUCOSE BLOOD QUANT: CPT | Performed by: INTERNAL MEDICINE

## 2022-09-14 NOTE — PROGRESS NOTES
"     Office Note      Date: 2022  Patient Name: Vadim Mckeon  MRN: [id number]  : 1955    Chief Complaint   Patient presents with   • Diabetes     Type II       History of Present Illness:   Vadim Mckeon is a 66 y.o. male who presents for Diabetes - type   Rx: daily insulin, metformin and I added prandin last time  He notes low fasting bg now - some in the 60's     Last A1c:  Hemoglobin A1C   Date Value Ref Range Status   2022 7.8 % Final   2022 9.1 (H) See comment % Final     Comment:     Comment:  Diagnosis of Diabetes: > or = 6.5%  Increased risk of diabetes (Prediabetes): 5.7-6.4%  Glycemic Control: Nonpregnant Adults: <7.0%                    Pregnant: <6.0%       Changes in health since last visit: none . Last eye exam 2022  Eye exam is up to date  He sees podiatry every 3 months and qualifies for therapeutic shoes because he has hammer toes and neuropathy.    Subjective          Review of Systems:   Review of Systems   Constitutional: Negative.    HENT: Negative.    Eyes: Negative.    Respiratory: Negative.        The following portions of the patient's history were reviewed and updated as appropriate: allergies, current medications, past family history, past medical history, past social history, past surgical history and problem list.    Objective     Visit Vitals  /64 (BP Location: Left arm, Patient Position: Sitting, Cuff Size: Adult)   Pulse 63   Ht 177.8 cm (70\")   Wt 95.7 kg (211 lb)   SpO2 97%   BMI 30.28 kg/m²       Labs:    CMP  No results found for: GLUCOSE, BUN, CREATININE, EGFRIFNONA, EGFRIFAFRI, BCR, K, CO2, CALCIUM, PROTENTOTREF, LABIL2, BILIRUBIN, AST, ALT     CBC w/DIFF  Lab Results   Component Value Date    WBC 6.7 2022    RBC 4.45 (L) 2022    HGB 13.5 2022    HCT 40.3 2022    MCV 90.6 2022    MCH 30.3 2022    MCHC 33.5 2022    RDW 13.2 2022    MPV 10.8 (H) 2022     (L) 2022    " NEUTRORELPCT 70.8 07/25/2022    LYMPHORELPCT 21.6 07/25/2022    MONORELPCT 6.0 07/25/2022    EOSRELPCT 0.6 07/25/2022    BASORELPCT 0.7 07/25/2022    AUTOIGPER 0.3 07/25/2022    NEUTROABS 4.7 07/25/2022    LYMPHSABS 1.5 07/25/2022    MONOSABS 0.4 07/25/2022    EOSABS 0.0 07/25/2022    BASOSABS 0.1 07/25/2022    AUTOIGNUM 0.0 07/25/2022       Physical Exam:  Physical Exam  Vitals reviewed.   Constitutional:       Appearance: Normal appearance.   Neurological:      Mental Status: He is alert.          Assessment / Plan      Assessment & Plan:  Problem List Items Addressed This Visit        Other    Uncontrolled type 2 diabetes mellitus with hyperglycemia (HCC)    Current Assessment & Plan     Improved with a1c down to 7.8  Can reduce insulin to 40 units to avoid nocturnal hypoglycemia.         Relevant Medications    repaglinide (Prandin) 1 MG tablet    metFORMIN (GLUCOPHAGE) 500 MG tablet    Toujeo SoloStar 300 UNIT/ML solution pen-injector injection    Other Relevant Orders    POC Glycosylated Hemoglobin (Hb A1C) (Completed)      Other Visit Diagnoses     Type 2 diabetes mellitus with unspecified complications (HCC)    -  Primary    Relevant Orders    POC Glucose, Blood (Completed)           Matthew Bills MD   09/14/2022

## 2022-10-14 ENCOUNTER — HOSPITAL ENCOUNTER (OUTPATIENT)
Dept: GENERAL RADIOLOGY | Facility: HOSPITAL | Age: 67
Discharge: HOME | End: 2022-10-14
Payer: MEDICARE

## 2022-10-14 ENCOUNTER — HOSPITAL ENCOUNTER (OUTPATIENT)
Facility: HOSPITAL | Age: 67
Discharge: HOME | End: 2022-10-14
Payer: MEDICARE

## 2022-10-14 DIAGNOSIS — M54.50 LOW BACK PAIN, UNSPECIFIED BACK PAIN LATERALITY, UNSPECIFIED CHRONICITY, UNSPECIFIED WHETHER SCIATICA PRESENT: ICD-10-CM

## 2022-10-14 PROCEDURE — 72100 X-RAY EXAM L-S SPINE 2/3 VWS: CPT

## 2022-10-24 ENCOUNTER — OFFICE VISIT (OUTPATIENT)
Dept: UROLOGY | Facility: CLINIC | Age: 67
End: 2022-10-24

## 2022-10-24 VITALS
TEMPERATURE: 97.8 F | SYSTOLIC BLOOD PRESSURE: 118 MMHG | BODY MASS INDEX: 30.49 KG/M2 | HEIGHT: 70 IN | WEIGHT: 213 LBS | HEART RATE: 80 BPM | DIASTOLIC BLOOD PRESSURE: 74 MMHG | OXYGEN SATURATION: 97 %

## 2022-10-24 DIAGNOSIS — N52.9 ERECTILE DYSFUNCTION, UNSPECIFIED ERECTILE DYSFUNCTION TYPE: Primary | ICD-10-CM

## 2022-10-24 PROCEDURE — 54235 NJX CORPORA CAVERNOSA RX AGT: CPT | Performed by: UROLOGY

## 2022-10-24 NOTE — PROGRESS NOTES
Chief Complaint   Patient presents with   • [facility]      Teaching      HPI  Ms. cMkeon is a 66 y.o. male with history below in assessment, who presents for follow up.     At this visit Cialis helped some but erections were only 5/10.     Past Medical History:   Diagnosis Date   • Hypercholesterolemia    • Hyperlipidemia    • Hypertension    • Movement disorder    • APOLINAR (obstructive sleep apnea)    • Parkinson's disease (HCC)    • Sprained ankle     History of   • Type II diabetes mellitus, uncontrolled        Past Surgical History:   Procedure Laterality Date   • KNEE ARTHROSCOPY W/ MENISCAL REPAIR           Current Outpatient Medications:   •  Armodafinil 250 MG tablet, Take 1 tablet by mouth Daily., Disp: 30 tablet, Rfl: 3  •  bisacodyl (bisacodyl) 5 MG EC tablet, Take 2 at 3pm and 2 at 7pm the day prior to colonoscopy., Disp: 4 tablet, Rfl: 0  •  carbidopa-levodopa (SINEMET)  MG per tablet, 4 (Four) Times a Day., Disp: , Rfl:   •  ciprofloxacin (Cipro) 500 MG tablet, Take 1 tablet by mouth 2 (Two) Times a Day., Disp: 6 tablet, Rfl: 0  •  citalopram (CeleXA) 40 MG tablet, Take 40 mg by mouth Daily., Disp: , Rfl:   •  clonazePAM (KlonoPIN) 1 MG tablet, , Disp: , Rfl:   •  colestipol (COLESTID) 1 g tablet, Take 1 g by mouth 2 (Two) Times a Day., Disp: , Rfl:   •  Continuous Blood Gluc  (FreeStyle Neda 2 [place]) device, 1 Device See Admin Instructions., Disp: 1 each, Rfl: 0  •  Continuous Blood Gluc Sensor (FreeStyle Neda 2 Sensor) misc, 1 Device Every 14 (Fourteen) Days., Disp: 2 each, Rfl: 5  •  cyanocobalamin 1000 MCG/ML injection, Inject 1 ml IM every 2 weeks for 2 doses, THEN ONCE monthly], Disp: , Rfl:   •  donepezil (ARICEPT) 23 MG tablet, Take 23 mg by mouth Daily., Disp: , Rfl:   •  donepezil (ARICEPT) 23 MG tablet, Take 1 tablet by mouth every night at bedtime., Disp: , Rfl:   •  Easy Touch Pen Needles 31G X 8 MM misc, USE ONE EVERY DAY, Disp: 100 each, Rfl: 3  •  lisinopril (PRINIVIL,ZESTRIL)  "20 MG tablet, Take 20 mg by mouth., Disp: , Rfl:   •  metFORMIN (GLUCOPHAGE) 500 MG tablet, Take 1 tablet by mouth 2 (Two) Times a Day With Meals., Disp: 60 tablet, Rfl: 5  •  Nuedexta 20-10 MG capsule capsule, , Disp: , Rfl:   •  Omega-3 Fatty Acids (FISH OIL TRIPLE STRENGTH) 1400 MG capsule, Take 1 capsule by mouth daily., Disp: , Rfl:   •  omeprazole (priLOSEC) 40 MG capsule, , Disp: , Rfl:   •  polyethylene glycol (MIRALAX) 17 GM/SCOOP powder, Mix 238g powder with 64 oz of clear liquid. Starting at 5pm drink 80z every 10-15 minutes until consumed., Disp: 238 g, Rfl: 0  •  Pramipexole Dihydrochloride ER 1.5 MG tablet sustained-release 24 hour, Take 1.5 mg by mouth Daily., Disp: , Rfl:   •  ranitidine (ZANTAC) 150 MG tablet, Take 150 mg by mouth 2 (two) times a day., Disp: , Rfl:   •  repaglinide (Prandin) 1 MG tablet, Take 1 tablet by mouth 3 (Three) Times a Day Before Meals., Disp: 90 tablet, Rfl: 5  •  rosuvastatin (CRESTOR) 20 MG tablet, Take 20 mg by mouth., Disp: , Rfl:   •  tadalafil (CIALIS) 5 MG tablet, Take 1 tablet by mouth Daily., Disp: 30 tablet, Rfl: 11  •  Toujeo SoloStar 300 UNIT/ML solution pen-injector injection, Inject 80 Units under the skin into the appropriate area as directed Daily., Disp: 9 mL, Rfl: 5  •  triamcinolone (KENALOG) 0.5 % cream, apply TO TO RASH AREA TWO TO THREE times daily, Disp: , Rfl:   •  True Metrix Blood Glucose Test test strip, TEST BLOOD SUGAR TWICE A DAY, Disp: 100 each, Rfl: 11  •  TRUEplus Pen Needles 31G X 6 MM misc, USE ONE EVERY DAY, Disp: , Rfl:   •  vitamin B-12 (CYANOCOBALAMIN) 1000 MCG tablet, Take 1,000 mcg by mouth Daily., Disp: , Rfl:   •  vitamin D (ERGOCALCIFEROL) 16575 units capsule capsule, TAKE 1 CAPSULE BY MOUTH ONCE WEEKLY, Disp: 4 capsule, Rfl: 12     Physical Exam  Visit Vitals  /74   Pulse 80   Temp 97.8 °F (36.6 °C)   Ht 177.8 cm (70\")   Wt 96.6 kg (213 lb)   SpO2 97%   BMI 30.56 kg/m²       Labs  Brief Urine Lab Results  (Last result in " the past 365 days)      Color   Clarity   Blood   Leuk Est   Nitrite   Protein   CREAT   Urine HCG        07/25/22 1216 Yellow   Clear   Negative   Negative   Negative   Negative                 No results found for: GLUCOSE, CALCIUM, NA, K, CO2, CL, BUN, CREATININE, EGFRIFAFRI, EGFRIFNONA, BCR, ANIONGAP    Lab Results   Component Value Date    WBC 6.7 07/25/2022    HGB 13.5 07/25/2022    HCT 40.3 07/25/2022    MCV 90.6 07/25/2022     (L) 07/25/2022         Lab Results   Component Value Date    PSA 2.28 07/25/2022    PSA 1.46 06/03/2021    PSA 1.44 12/04/2018           Radiographic Studies  XR LUMBAR SPINE (2-3 VIEWS)  Result Date: 10/14/2022  3 views demonstrate 5 lumbar type vertebral bodies with mild dextroscoliosis. Alignment is otherwise anatomic without fracture. There is mild degenerative disc disease at L1-2. Facet hypertrophy is mild at L5-S1.    I have reviewed above labs and imaging.     Assessment  66 y.o. male with poorly controlled DM, chronic ED, history of epididymitis.    In office today he received an injection of Trimix with a 100% erection at a dosage of 0.2 mL.  He would like to use this.    Plan  1.  Stop Cialis.  Trimix ordered.  2.  Follow-up with RONEY in 3 months to make sure it is working well for him

## 2022-12-05 ENCOUNTER — OFFICE VISIT (OUTPATIENT)
Dept: CARDIOLOGY | Facility: CLINIC | Age: 67
End: 2022-12-05

## 2022-12-05 VITALS
BODY MASS INDEX: 29.54 KG/M2 | WEIGHT: 211 LBS | HEIGHT: 71 IN | RESPIRATION RATE: 16 BRPM | DIASTOLIC BLOOD PRESSURE: 62 MMHG | OXYGEN SATURATION: 98 % | HEART RATE: 71 BPM | SYSTOLIC BLOOD PRESSURE: 118 MMHG

## 2022-12-05 DIAGNOSIS — E11.65 UNCONTROLLED TYPE 2 DIABETES MELLITUS WITH HYPERGLYCEMIA: ICD-10-CM

## 2022-12-05 DIAGNOSIS — I10 PRIMARY HYPERTENSION: Primary | ICD-10-CM

## 2022-12-05 DIAGNOSIS — G20 PARKINSON'S DISEASE: ICD-10-CM

## 2022-12-05 DIAGNOSIS — E78.00 PURE HYPERCHOLESTEROLEMIA: ICD-10-CM

## 2022-12-05 PROCEDURE — 99213 OFFICE O/P EST LOW 20 MIN: CPT | Performed by: INTERNAL MEDICINE

## 2022-12-05 NOTE — PROGRESS NOTES
[facility] Cardiology Office Follow Up Note    Vadim Mckeon  [id number]  2022    Primary Care Provider: Zuleyma Blackburn APRN    Chief Complaint: Routine follow-up    History of Present Illness:   Mr. Vadim Mckeon is a 67y.o. male who presents to the Cardiology Clinic for routine follow-up. The patient has a past medical history significant for type 2 diabetes mellitus, hypertension, lipidemia, dementia, and Parkinson's disease.  He does not have any significant past cardiac history.  He returns to cardiology clinic today for routine follow-up.  Since last appointment, the patient reports he has been well without significant changes in his health.  He continues to monitor his blood pressure intermittently at home, with a systolic BP typically being well controlled.  Since his last appointment, he reports interval improvement in his mild exertional dyspnea.  He remains active, without chest pain or exertional chest discomfort.  No specific complaints today.    Past Cardiac Testin. Last Coronary Angio: None  2. Prior Stress Testing: None  3. Last Echo:  2021   1.  Normal left ventricular Luis Manuel function, LVEF 65-70%   2.  Grade 1 diastolic dysfunction   3.  Mild concentric LVH  4. Prior Holter Monitor: None    Review of Systems:   Review of Systems   Constitutional: Negative for activity change, appetite change, chills, diaphoresis, fatigue, fever, unexpected weight gain and unexpected weight loss.   Eyes: Negative for blurred vision and double vision.   Respiratory: Negative for cough, chest tightness, shortness of breath and wheezing.    Cardiovascular: Negative for chest pain, palpitations and leg swelling.   Gastrointestinal: Negative for abdominal pain, anal bleeding, blood in stool and GERD.   Endocrine: Negative for cold intolerance and heat intolerance.   Genitourinary: Negative for hematuria.   Neurological: Negative for dizziness, syncope, weakness  and light-headedness.   Hematological: Does not bruise/bleed easily.   Psychiatric/Behavioral: Negative for depressed mood and stress. The patient is not nervous/anxious.        I have reviewed and/or updated the patient's past medical, past surgical, family, social history, problem list and allergies as appropriate.     Medications:     Current Outpatient Medications:   •  carbidopa-levodopa (SINEMET)  MG per tablet, 4 (Four) Times a Day., Disp: , Rfl:   •  citalopram (CeleXA) 40 MG tablet, Take 40 mg by mouth Daily., Disp: , Rfl:   •  clonazePAM (KlonoPIN) 1 MG tablet, , Disp: , Rfl:   •  Continuous Blood Gluc  (FreeStyle Neda 2 [place]) device, 1 Device See Admin Instructions., Disp: 1 each, Rfl: 0  •  Continuous Blood Gluc Sensor (FreeStyle Neda 2 Sensor) misc, 1 Device Every 14 (Fourteen) Days., Disp: 2 each, Rfl: 5  •  donepezil (ARICEPT) 23 MG tablet, Take 23 mg by mouth Daily., Disp: , Rfl:   •  donepezil (ARICEPT) 23 MG tablet, Take 1 tablet by mouth every night at bedtime., Disp: , Rfl:   •  Easy Touch Pen Needles 31G X 8 MM misc, USE ONE EVERY DAY, Disp: 100 each, Rfl: 3  •  lisinopril (PRINIVIL,ZESTRIL) 20 MG tablet, Take 20 mg by mouth., Disp: , Rfl:   •  metFORMIN (GLUCOPHAGE) 500 MG tablet, Take 1 tablet by mouth 2 (Two) Times a Day With Meals. (Patient taking differently: Take 1,000 mg by mouth 2 (Two) Times a Day With Meals.), Disp: 60 tablet, Rfl: 5  •  Nuedexta 20-10 MG capsule capsule, , Disp: , Rfl:   •  omeprazole (priLOSEC) 40 MG capsule, , Disp: , Rfl:   •  Pramipexole Dihydrochloride ER 1.5 MG tablet sustained-release 24 hour, Take 1.5 mg by mouth Daily., Disp: , Rfl:   •  repaglinide (Prandin) 1 MG tablet, Take 1 tablet by mouth 3 (Three) Times a Day Before Meals., Disp: 90 tablet, Rfl: 5  •  rosuvastatin (CRESTOR) 20 MG tablet, Take 20 mg by mouth., Disp: , Rfl:   •  Toujeo SoloStar 300 UNIT/ML solution pen-injector injection, Inject 80 Units under the skin into the  "appropriate area as directed Daily., Disp: 9 mL, Rfl: 5  •  triamcinolone (KENALOG) 0.5 % cream, apply TO TO RASH AREA TWO TO THREE times daily, Disp: , Rfl:   •  True Metrix Blood Glucose Test test strip, TEST BLOOD SUGAR TWICE A DAY, Disp: 100 each, Rfl: 11  •  TRUEplus Pen Needles 31G X 6 MM misc, USE ONE EVERY DAY, Disp: , Rfl:   •  vitamin D (ERGOCALCIFEROL) 75107 units capsule capsule, TAKE 1 CAPSULE BY MOUTH ONCE WEEKLY, Disp: 4 capsule, Rfl: 12  •  Armodafinil 250 MG tablet, Take 1 tablet by mouth Daily., Disp: 30 tablet, Rfl: 3  •  bisacodyl (bisacodyl) 5 MG EC tablet, Take 2 at 3pm and 2 at 7pm the day prior to colonoscopy., Disp: 4 tablet, Rfl: 0  •  ciprofloxacin (Cipro) 500 MG tablet, Take 1 tablet by mouth 2 (Two) Times a Day., Disp: 6 tablet, Rfl: 0  •  colestipol (COLESTID) 1 g tablet, Take 1 g by mouth 2 (Two) Times a Day., Disp: , Rfl:   •  cyanocobalamin 1000 MCG/ML injection, Inject 1 ml IM every 2 weeks for 2 doses, THEN ONCE monthly], Disp: , Rfl:   •  Omega-3 Fatty Acids (FISH OIL TRIPLE STRENGTH) 1400 MG capsule, Take 1 capsule by mouth daily., Disp: , Rfl:   •  polyethylene glycol (MIRALAX) 17 GM/SCOOP powder, Mix 238g powder with 64 oz of clear liquid. Starting at 5pm drink 80z every 10-15 minutes until consumed., Disp: 238 g, Rfl: 0  •  ranitidine (ZANTAC) 150 MG tablet, Take 150 mg by mouth 2 (two) times a day., Disp: , Rfl:   •  vitamin B-12 (CYANOCOBALAMIN) 1000 MCG tablet, Take 1,000 mcg by mouth Daily., Disp: , Rfl:     Physical Exam:  Vital Signs:   Vitals:    12/05/22 1112   BP: 118/62   BP Location: Left arm   Pulse: 71   Resp: 16   SpO2: 98%   Weight: 95.7 kg (211 lb)   Height: 180.3 cm (71\")       Physical Exam  Constitutional:       General: He is not in acute distress.     Appearance: Normal appearance. He is not diaphoretic.   HENT:      Head: Normocephalic and atraumatic.   Cardiovascular:      Rate and Rhythm: Normal rate and regular rhythm.      Heart sounds: No murmur " heard.  Pulmonary:      Effort: Pulmonary effort is normal. No respiratory distress.      Breath sounds: Normal breath sounds. No stridor. No wheezing, rhonchi or rales.   Abdominal:      General: Bowel sounds are normal. There is no distension.      Palpations: Abdomen is soft.      Tenderness: There is no abdominal tenderness. There is no guarding or rebound.   Musculoskeletal:         General: No swelling. Normal range of motion.      Cervical back: Neck supple. No tenderness.   Skin:     General: Skin is warm and dry.   Neurological:      General: No focal deficit present.      Mental Status: He is alert and oriented to person, place, and time.   Psychiatric:         Mood and Affect: Mood normal.         Behavior: Behavior normal.         Results Review:   I reviewed the patient's new clinical results.      Assessment / Plan:     1. Essential hypertension  -- BP remains well controlled  --Continue current antihypertensives     2.  Hyperlipidemia  -- Last lipid profile in 7/22 showed LDL 18, HDL 30, triglycerides 205  --Continue statin     3. Type 2 diabetes mellitus  -- Last hemoglobin A1c in 7/22 9.1%  --Management per PCP     4.  Parkinson's disease  --Follows with neurology       Follow Up:   Return in about 1 year (around 12/5/2023).      Thank you for allowing me to participate in the care of your patient. Please to not hesitate to contact me with additional questions or concerns.     GUTIERREZ Levy MD  Interventional Cardiology   12/05/2022  11:15 EST

## 2022-12-30 RX ORDER — REPAGLINIDE 1 MG/1
1 TABLET ORAL
Qty: 90 TABLET | Refills: 5 | Status: SHIPPED | OUTPATIENT
Start: 2022-12-30 | End: 2023-03-14

## 2023-01-04 ENCOUNTER — HOSPITAL ENCOUNTER (OUTPATIENT)
Facility: HOSPITAL | Age: 68
Discharge: HOME | End: 2023-01-04
Payer: MEDICARE

## 2023-01-04 LAB
A/G RATIO: 1.9 (ref 0.8–2)
ALBUMIN SERPL-MCNC: 4.4 G/DL (ref 3.4–4.8)
ALP BLD-CCNC: 78 U/L (ref 25–100)
ALT SERPL-CCNC: 19 U/L (ref 4–36)
ANION GAP SERPL CALCULATED.3IONS-SCNC: 11 MMOL/L (ref 3–16)
AST SERPL-CCNC: 26 U/L (ref 8–33)
BASOPHILS ABSOLUTE: 0 K/UL (ref 0–0.1)
BASOPHILS RELATIVE PERCENT: 0.5 %
BILIRUB SERPL-MCNC: 0.5 MG/DL (ref 0.3–1.2)
BUN BLDV-MCNC: 20 MG/DL (ref 6–20)
CALCIUM SERPL-MCNC: 9 MG/DL (ref 8.5–10.5)
CHLORIDE BLD-SCNC: 99 MMOL/L (ref 98–107)
CHOLESTEROL, TOTAL: 84 MG/DL (ref 0–200)
CO2: 26 MMOL/L (ref 20–30)
CREAT SERPL-MCNC: 1.3 MG/DL (ref 0.4–1.2)
EOSINOPHILS ABSOLUTE: 0 K/UL (ref 0–0.4)
EOSINOPHILS RELATIVE PERCENT: 0.5 %
FOLATE: 9.66 NG/ML
GFR SERPL CREATININE-BSD FRML MDRD: >60 ML/MIN/{1.73_M2}
GLOBULIN: 2.3 G/DL
GLUCOSE BLD-MCNC: 233 MG/DL (ref 74–106)
HCT VFR BLD CALC: 34.8 % (ref 40–54)
HDLC SERPL-MCNC: 29 MG/DL (ref 40–60)
HEMOGLOBIN: 12 G/DL (ref 13–18)
IMMATURE GRANULOCYTES #: 0 K/UL
IMMATURE GRANULOCYTES %: 0.3 % (ref 0–5)
LDL CHOLESTEROL CALCULATED: -1 MG/DL
LYMPHOCYTES ABSOLUTE: 1.6 K/UL (ref 1.5–4)
LYMPHOCYTES RELATIVE PERCENT: 20.6 %
MCH RBC QN AUTO: 31.3 PG (ref 27–32)
MCHC RBC AUTO-ENTMCNC: 34.5 G/DL (ref 31–35)
MCV RBC AUTO: 90.6 FL (ref 80–100)
MONOCYTES ABSOLUTE: 0.4 K/UL (ref 0.2–0.8)
MONOCYTES RELATIVE PERCENT: 5.3 %
NEUTROPHILS ABSOLUTE: 5.5 K/UL (ref 2–7.5)
NEUTROPHILS RELATIVE PERCENT: 72.8 %
PDW BLD-RTO: 13.1 % (ref 11–16)
PLATELET # BLD: 152 K/UL (ref 150–400)
PMV BLD AUTO: 10.4 FL (ref 6–10)
POTASSIUM SERPL-SCNC: 4 MMOL/L (ref 3.4–5.1)
RBC # BLD: 3.84 M/UL (ref 4.5–6)
SODIUM BLD-SCNC: 136 MMOL/L (ref 136–145)
TOTAL PROTEIN: 6.7 G/DL (ref 6.4–8.3)
TRIGL SERPL-MCNC: 281 MG/DL (ref 0–249)
TSH SERPL DL<=0.05 MIU/L-ACNC: 3.61 UIU/ML (ref 0.27–4.2)
VITAMIN B-12: 259 PG/ML (ref 211–911)
VLDLC SERPL CALC-MCNC: 56 MG/DL
WBC # BLD: 7.6 K/UL (ref 4–11)

## 2023-01-04 PROCEDURE — 84443 ASSAY THYROID STIM HORMONE: CPT

## 2023-01-04 PROCEDURE — 36415 COLL VENOUS BLD VENIPUNCTURE: CPT

## 2023-01-04 PROCEDURE — 85025 COMPLETE CBC W/AUTO DIFF WBC: CPT

## 2023-01-04 PROCEDURE — 82607 VITAMIN B-12: CPT

## 2023-01-04 PROCEDURE — 80061 LIPID PANEL: CPT

## 2023-01-04 PROCEDURE — 87177 OVA AND PARASITES SMEARS: CPT

## 2023-01-04 PROCEDURE — 80053 COMPREHEN METABOLIC PANEL: CPT

## 2023-01-04 PROCEDURE — 87209 SMEAR COMPLEX STAIN: CPT

## 2023-01-04 PROCEDURE — 82746 ASSAY OF FOLIC ACID SERUM: CPT

## 2023-01-08 LAB — INTERPRETATION: NEGATIVE

## 2023-01-25 ENCOUNTER — OFFICE VISIT (OUTPATIENT)
Dept: UROLOGY | Facility: CLINIC | Age: 68
End: 2023-01-25
Payer: MEDICARE

## 2023-01-25 VITALS
RESPIRATION RATE: 18 BRPM | SYSTOLIC BLOOD PRESSURE: 118 MMHG | TEMPERATURE: 97.2 F | HEIGHT: 71 IN | OXYGEN SATURATION: 97 % | HEART RATE: 90 BPM | DIASTOLIC BLOOD PRESSURE: 68 MMHG | BODY MASS INDEX: 29.26 KG/M2 | WEIGHT: 209 LBS

## 2023-01-25 DIAGNOSIS — N52.1 ERECTILE DYSFUNCTION DUE TO DISEASES CLASSIFIED ELSEWHERE: Primary | ICD-10-CM

## 2023-01-25 PROCEDURE — 99213 OFFICE O/P EST LOW 20 MIN: CPT | Performed by: PHYSICIAN ASSISTANT

## 2023-01-25 RX ORDER — TADALAFIL 5 MG/1
5 TABLET ORAL DAILY
COMMUNITY
Start: 2023-01-04

## 2023-01-25 RX ORDER — HYDROXYZINE HYDROCHLORIDE 25 MG/1
TABLET, FILM COATED ORAL
COMMUNITY
Start: 2023-01-23

## 2023-01-25 RX ORDER — LISINOPRIL AND HYDROCHLOROTHIAZIDE 25; 20 MG/1; MG/1
TABLET ORAL
COMMUNITY
Start: 2023-01-06

## 2023-01-25 RX ORDER — CEPHALEXIN 500 MG/1
CAPSULE ORAL
COMMUNITY
Start: 2023-01-23 | End: 2023-01-25

## 2023-01-25 RX ORDER — LOPERAMIDE HYDROCHLORIDE 2 MG/1
CAPSULE ORAL
COMMUNITY
Start: 2023-01-04

## 2023-01-25 NOTE — PROGRESS NOTES
Chief Complaint   Patient presents with   • Erectile Dysfunction     3 mo fu ed        HPI  Mr. Mckeon is a 67 y.o. male with history of DM2, ED who presents for follow up.     At this visit, tells me that he has had an excellent response with Trimix usage at home.  He says he is using the prescribed volume, 0.2 cc, and injecting easily and without problems.  He tells me that his erections are as hard as desired and last as long as he would like them to.    Past Medical History:   Diagnosis Date   • Hypercholesterolemia    • Hyperlipidemia    • Hypertension    • Movement disorder    • APOLINAR (obstructive sleep apnea)    • Parkinson's disease (HCC)    • Sprained ankle     History of   • Type II diabetes mellitus, uncontrolled        Past Surgical History:   Procedure Laterality Date   • RENE HOLE FOR INSERTION DBS LEADS     • KNEE ARTHROSCOPY W/ MENISCAL REPAIR           Current Outpatient Medications:   •  Armodafinil 250 MG tablet, Take 1 tablet by mouth Daily., Disp: 30 tablet, Rfl: 3  •  carbidopa-levodopa (SINEMET)  MG per tablet, 4 (Four) Times a Day., Disp: , Rfl:   •  citalopram (CeleXA) 40 MG tablet, Take 40 mg by mouth Daily., Disp: , Rfl:   •  clonazePAM (KlonoPIN) 1 MG tablet, , Disp: , Rfl:   •  colestipol (COLESTID) 1 g tablet, Take 1 g by mouth 2 (Two) Times a Day., Disp: , Rfl:   •  Continuous Blood Gluc Sensor (FreeStyle Neda 2 Sensor) misc, 1 Device Every 14 (Fourteen) Days., Disp: 2 each, Rfl: 5  •  cyanocobalamin 1000 MCG/ML injection, Inject 1 ml IM every 2 weeks for 2 doses, THEN ONCE monthly], Disp: , Rfl:   •  donepezil (ARICEPT) 23 MG tablet, Take 1 tablet by mouth every night at bedtime., Disp: , Rfl:   •  Easy Touch Pen Needles 31G X 8 MM misc, USE ONE EVERY DAY, Disp: 100 each, Rfl: 3  •  hydrOXYzine (ATARAX) 25 MG tablet, , Disp: , Rfl:   •  lisinopril-hydrochlorothiazide (PRINZIDE,ZESTORETIC) 20-25 MG per tablet, TAKE ONE TABLET BY MOUTH everyday, Disp: , Rfl:   •  loperamide  "(IMODIUM) 2 MG capsule, , Disp: , Rfl:   •  metFORMIN (GLUCOPHAGE) 1000 MG tablet, Take 1,000 mg by mouth 2 (Two) Times a Day., Disp: , Rfl:   •  Nuedexta 20-10 MG capsule capsule, , Disp: , Rfl:   •  Omega-3 Fatty Acids (FISH OIL TRIPLE STRENGTH) 1400 MG capsule, Take 1 capsule by mouth daily., Disp: , Rfl:   •  omeprazole (priLOSEC) 40 MG capsule, , Disp: , Rfl:   •  Pramipexole Dihydrochloride ER 1.5 MG tablet sustained-release 24 hour, Take 1.5 mg by mouth Daily., Disp: , Rfl:   •  ranitidine (ZANTAC) 150 MG tablet, Take 150 mg by mouth 2 (two) times a day., Disp: , Rfl:   •  repaglinide (PRANDIN) 1 MG tablet, Take 1 tablet by mouth 3 (Three) Times a Day Before Meals., Disp: 90 tablet, Rfl: 5  •  rosuvastatin (CRESTOR) 20 MG tablet, Take 20 mg by mouth., Disp: , Rfl:   •  tadalafil (CIALIS) 5 MG tablet, Take 5 mg by mouth Daily., Disp: , Rfl:   •  Toujeo SoloStar 300 UNIT/ML solution pen-injector injection, Inject 80 Units under the skin into the appropriate area as directed Daily., Disp: 9 mL, Rfl: 5  •  triamcinolone (KENALOG) 0.5 % cream, apply TO TO RASH AREA TWO TO THREE times daily, Disp: , Rfl:   •  True Metrix Blood Glucose Test test strip, TEST BLOOD SUGAR TWICE A DAY, Disp: 100 each, Rfl: 11  •  vitamin D (ERGOCALCIFEROL) 01019 units capsule capsule, TAKE 1 CAPSULE BY MOUTH ONCE WEEKLY, Disp: 4 capsule, Rfl: 12     Physical Exam  Visit Vitals  /68 (BP Location: Left arm, Patient Position: Sitting, Cuff Size: Adult)   Pulse 90   Temp 97.2 °F (36.2 °C) (Temporal)   Resp 18   Ht 180.3 cm (71\")   Wt 94.8 kg (209 lb)   SpO2 97%   BMI 29.15 kg/m²       Labs  Brief Urine Lab Results  (Last result in the past 365 days)      Color   Clarity   Blood   Leuk Est   Nitrite   Protein   CREAT   Urine HCG        01/09/23 1312 Yellow   Clear     Negative                     Lab Results   Component Value Date    WBC 7.58 01/09/2023    HGB 12.7 (L) 01/09/2023    HCT 36.9 (L) 01/09/2023    MCV 90 01/09/2023    PLT " 179 01/09/2023            Lab Results   Component Value Date    PSA 2.28 07/25/2022    PSA 1.46 06/03/2021    PSA 1.44 12/04/2018       Assessment  67 y.o. male with DM2, ED who presents for Trimix follow-up.  He has had an excellent and robust response.  He has no problems with injection.  I counseled him that it is normal to need to increase volume and concentration of Trimix over time, not to hesitate in reaching out to us if this is the case.    I have otherwise reminded him that he will be due for prostate cancer screening with his primary care in July of this year.    Plan  1.  Continue Trimix as prescribed    Follow-up in 1 year or sooner as needed

## 2023-03-14 ENCOUNTER — OFFICE VISIT (OUTPATIENT)
Dept: ENDOCRINOLOGY | Facility: CLINIC | Age: 68
End: 2023-03-14
Payer: MEDICARE

## 2023-03-14 VITALS
HEART RATE: 78 BPM | SYSTOLIC BLOOD PRESSURE: 114 MMHG | WEIGHT: 212 LBS | HEIGHT: 71 IN | OXYGEN SATURATION: 97 % | BODY MASS INDEX: 29.68 KG/M2 | DIASTOLIC BLOOD PRESSURE: 65 MMHG

## 2023-03-14 DIAGNOSIS — E11.65 UNCONTROLLED TYPE 2 DIABETES MELLITUS WITH HYPERGLYCEMIA: Primary | ICD-10-CM

## 2023-03-14 LAB
EXPIRATION DATE: ABNORMAL
EXPIRATION DATE: NORMAL
GLUCOSE BLDC GLUCOMTR-MCNC: 225 MG/DL (ref 70–130)
HBA1C MFR BLD: 10.6 %
Lab: ABNORMAL
Lab: NORMAL

## 2023-03-14 PROCEDURE — 82947 ASSAY GLUCOSE BLOOD QUANT: CPT | Performed by: INTERNAL MEDICINE

## 2023-03-14 PROCEDURE — 3046F HEMOGLOBIN A1C LEVEL >9.0%: CPT | Performed by: INTERNAL MEDICINE

## 2023-03-14 PROCEDURE — 1160F RVW MEDS BY RX/DR IN RCRD: CPT | Performed by: INTERNAL MEDICINE

## 2023-03-14 PROCEDURE — 99214 OFFICE O/P EST MOD 30 MIN: CPT | Performed by: INTERNAL MEDICINE

## 2023-03-14 PROCEDURE — 83036 HEMOGLOBIN GLYCOSYLATED A1C: CPT | Performed by: INTERNAL MEDICINE

## 2023-03-14 PROCEDURE — 1159F MED LIST DOCD IN RCRD: CPT | Performed by: INTERNAL MEDICINE

## 2023-03-14 RX ORDER — INSULIN LISPRO 100 [IU]/ML
8 INJECTION, SOLUTION INTRAVENOUS; SUBCUTANEOUS
Qty: 24 ML | Refills: 3 | Status: SHIPPED | OUTPATIENT
Start: 2023-03-14

## 2023-03-14 RX ORDER — PEN NEEDLE, DIABETIC 31 GX5/16"
NEEDLE, DISPOSABLE MISCELLANEOUS
Qty: 400 EACH | Refills: 3 | Status: SHIPPED | OUTPATIENT
Start: 2023-03-14 | End: 2023-03-20 | Stop reason: SDUPTHER

## 2023-03-14 NOTE — PROGRESS NOTES
"     Office Note      Date: 2023  Patient Name: Vadim Mckeon  MRN: [id number]  : 1955    Chief Complaint   Patient presents with   • Diabetes       History of Present Illness:   Vadim Mckeon is a 67 y.o. male who presents for Diabetes type 2.   Current RX daily insulin, prandin and metformin     Bg checks are done:>10 times per day with deni 2 but is out of sensors now  Hypoglycemia : minimal      Last A1c:  Hemoglobin A1C   Date Value Ref Range Status   2023 10.6 % Final   2022 9.1 (H) See comment % Final     Comment:     Comment:  Diagnosis of Diabetes: > or = 6.5%  Increased risk of diabetes (Prediabetes): 5.7-6.4%  Glycemic Control: Nonpregnant Adults: <7.0%                    Pregnant: <6.0%       Changes in health since last visit:  Battery change for DBS system . Last eye exam  Up to date .    Subjective              Review of Systems:   Review of Systems   Constitutional: Negative.    HENT: Negative.    Eyes: Negative.    Respiratory: Negative.        The following portions of the patient's history were reviewed and updated as appropriate: allergies, current medications, past family history, past medical history, past social history, past surgical history and problem list.    Objective     Visit Vitals  /65   Pulse 78   Ht 180.3 cm (71\")   Wt 96.2 kg (212 lb)   SpO2 97%   BMI 29.57 kg/m²           Physical Exam:  Physical Exam  Vitals reviewed.   Constitutional:       Appearance: Normal appearance.   Feet:      Comments: Shallow ulcer distal tip of right third toe   Neurological:      Mental Status: He is alert.   Psychiatric:         Mood and Affect: Mood normal.         Thought Content: Thought content normal.         Judgment: Judgment normal.          Assessment / Plan      Assessment & Plan:  Problem List Items Addressed This Visit        Other    Uncontrolled type 2 diabetes mellitus with hyperglycemia (HCC) - Primary    Current Assessment & Plan      " Deteriorated  The plan is to stop prandin and add meal time insulin so he will be on basal bolus insulin and metformin. I wrote this out for him          Relevant Medications    Toujeo SoloStar 300 UNIT/ML solution pen-injector injection    metFORMIN (GLUCOPHAGE) 1000 MG tablet    Insulin Lispro, 1 Unit Dial, (HumaLOG KwikPen) 100 UNIT/ML solution pen-injector    Other Relevant Orders    POC Glucose, Blood (Completed)    POC Glycosylated Hemoglobin (Hb A1C) (Completed)        Matthew Bills MD   03/14/2023

## 2023-03-14 NOTE — ASSESSMENT & PLAN NOTE
Deteriorated  The plan is to stop prandin and add meal time insulin so he will be on basal bolus insulin and metformin. I wrote this out for him

## 2023-03-20 RX ORDER — PEN NEEDLE, DIABETIC 31 GX5/16"
NEEDLE, DISPOSABLE MISCELLANEOUS
Qty: 400 EACH | Refills: 3 | Status: SHIPPED | OUTPATIENT
Start: 2023-03-20

## 2023-03-20 NOTE — TELEPHONE ENCOUNTER
Pt called states he is injects 4 time a day as is running out of pen needles Easy Touch pen needles. Please send a new prescription for 4 time a day. Pt seen 03/14/23 pt next f/u 06/28/23

## 2023-05-12 ENCOUNTER — TELEPHONE (OUTPATIENT)
Dept: ENDOCRINOLOGY | Facility: CLINIC | Age: 68
End: 2023-05-12
Payer: MEDICARE

## 2023-05-12 NOTE — TELEPHONE ENCOUNTER
For a rene his size he is still on small insulin doses. Increase toujeo to 50 units and log to 12 units at each meal. It is also important that he eat right

## 2023-05-12 NOTE — TELEPHONE ENCOUNTER
Spoke with patient.  He states that since switching from pills to insulin he has not seen any change in his blood sugars.  They are running 200 in the morning.  Currently taking humalog 8 units with each meal and toujeo 40 units daily.

## 2023-05-12 NOTE — TELEPHONE ENCOUNTER
PT CALLED STATING HIS BS HAS BEEN RUNNING HIGH SINCE HE STARTED INSULIN. HE STATED HIS BS WAS LOWER WHEN TAKING ORAL MEDICATION. HE REQUESTED A CALL BACK TO CONSULT.

## 2023-06-04 ENCOUNTER — APPOINTMENT (OUTPATIENT)
Dept: CT IMAGING | Facility: HOSPITAL | Age: 68
End: 2023-06-04
Attending: HOSPITALIST
Payer: MEDICARE

## 2023-06-04 ENCOUNTER — HOSPITAL ENCOUNTER (EMERGENCY)
Facility: HOSPITAL | Age: 68
Discharge: HOME | End: 2023-06-04
Attending: HOSPITALIST
Payer: MEDICARE

## 2023-06-04 VITALS
DIASTOLIC BLOOD PRESSURE: 74 MMHG | HEART RATE: 72 BPM | SYSTOLIC BLOOD PRESSURE: 140 MMHG | TEMPERATURE: 97.7 F | RESPIRATION RATE: 16 BRPM | OXYGEN SATURATION: 96 % | HEIGHT: 71 IN | BODY MASS INDEX: 28 KG/M2 | WEIGHT: 200 LBS
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
## 2023-06-04 DIAGNOSIS — S22.32XA CLOSED FRACTURE OF ONE RIB OF LEFT SIDE, INITIAL ENCOUNTER: ICD-10-CM

## 2023-06-04 PROCEDURE — 6370000000 HC RX 637 (ALT 250 FOR IP): Performed by: HOSPITALIST

## 2023-06-04 PROCEDURE — 99284 EMERGENCY DEPT VISIT MOD MDM: CPT

## 2023-06-04 PROCEDURE — 98960 EDU&TRN PT SELF-MGMT NQHP 1: CPT

## 2023-06-04 PROCEDURE — 71250 CT THORAX DX C-: CPT

## 2023-06-04 RX ORDER — REPAGLINIDE 1 MG/1
1 TABLET ORAL
COMMUNITY

## 2023-06-04 RX ORDER — OXYCODONE HYDROCHLORIDE AND ACETAMINOPHEN 5; 325 MG/1; MG/1
1 TABLET ORAL ONCE
Status: COMPLETED | OUTPATIENT
Start: 2023-06-04 | End: 2023-06-04

## 2023-06-04 RX ORDER — NICOTINE POLACRILEX 4 MG/1
20 GUM, CHEWING ORAL DAILY
COMMUNITY

## 2023-06-04 RX ORDER — TADALAFIL 5 MG/1
5 TABLET ORAL PRN
COMMUNITY

## 2023-06-04 RX ORDER — CLONAZEPAM 1 MG/1
1 TABLET ORAL NIGHTLY PRN
COMMUNITY

## 2023-06-04 RX ORDER — OXYCODONE HYDROCHLORIDE AND ACETAMINOPHEN 5; 325 MG/1; MG/1
1 TABLET ORAL EVERY 6 HOURS PRN
Qty: 12 TABLET | Refills: 0 | Status: SHIPPED | OUTPATIENT
Start: 2023-06-04 | End: 2023-06-07

## 2023-06-04 RX ORDER — DEXTROMETHORPHAN HYDROBROMIDE AND QUINIDINE SULFATE 20; 10 MG/1; MG/1
1 CAPSULE, GELATIN COATED ORAL DAILY
COMMUNITY

## 2023-06-04 RX ADMIN — OXYCODONE AND ACETAMINOPHEN 1 TABLET: 5; 325 TABLET ORAL at 18:20

## 2023-06-04 ASSESSMENT — PAIN DESCRIPTION - DESCRIPTORS: DESCRIPTORS: ACHING

## 2023-06-04 ASSESSMENT — PAIN DESCRIPTION - ORIENTATION: ORIENTATION: LEFT

## 2023-06-04 ASSESSMENT — PAIN DESCRIPTION - ONSET: ONSET: SUDDEN

## 2023-06-04 ASSESSMENT — PAIN DESCRIPTION - LOCATION
LOCATION: RIB CAGE
LOCATION: RIB CAGE

## 2023-06-04 ASSESSMENT — PAIN DESCRIPTION - FREQUENCY: FREQUENCY: CONTINUOUS

## 2023-06-04 ASSESSMENT — PAIN SCALES - GENERAL
PAINLEVEL_OUTOF10: 4
PAINLEVEL_OUTOF10: 3
PAINLEVEL_OUTOF10: 4

## 2023-06-04 ASSESSMENT — PAIN - FUNCTIONAL ASSESSMENT: PAIN_FUNCTIONAL_ASSESSMENT: 0-10

## 2023-06-04 ASSESSMENT — PAIN DESCRIPTION - PAIN TYPE: TYPE: ACUTE PAIN

## 2023-09-20 ENCOUNTER — HOSPITAL ENCOUNTER (OUTPATIENT)
Facility: HOSPITAL | Age: 68
Discharge: HOME | End: 2023-09-20
Payer: MEDICARE

## 2023-09-20 LAB
ALBUMIN SERPL-MCNC: 4.8 G/DL (ref 3.4–4.8)
ALBUMIN/GLOB SERPL: 2 {RATIO} (ref 0.8–2)
ALP SERPL-CCNC: 86 U/L (ref 25–100)
ALT SERPL-CCNC: 13 U/L (ref 4–36)
ANION GAP SERPL CALCULATED.3IONS-SCNC: 15 MMOL/L (ref 3–16)
AST SERPL-CCNC: 17 U/L (ref 8–33)
BASOPHILS # BLD: 0.1 K/UL (ref 0–0.1)
BASOPHILS NFR BLD: 0.8 %
BILIRUB SERPL-MCNC: 0.5 MG/DL (ref 0.3–1.2)
BUN SERPL-MCNC: 26 MG/DL (ref 6–20)
CALCIUM SERPL-MCNC: 9.1 MG/DL (ref 8.5–10.5)
CHLORIDE SERPL-SCNC: 101 MMOL/L (ref 98–107)
CHOLEST SERPL-MCNC: 86 MG/DL (ref 0–200)
CO2 SERPL-SCNC: 23 MMOL/L (ref 20–30)
CREAT SERPL-MCNC: 1.5 MG/DL (ref 0.4–1.2)
EOSINOPHIL # BLD: 0 K/UL (ref 0–0.4)
EOSINOPHIL NFR BLD: 0.4 %
ERYTHROCYTE [DISTWIDTH] IN BLOOD BY AUTOMATED COUNT: 13.2 % (ref 11–16)
FOLATE SERPL-MCNC: 9.68 NG/ML
GFR SERPLBLD CREATININE-BSD FMLA CKD-EPI: 51 ML/MIN/{1.73_M2}
GLOBULIN SER CALC-MCNC: 2.4 G/DL
GLUCOSE SERPL-MCNC: 162 MG/DL (ref 74–106)
HBA1C MFR BLD: 5.9 %
HCT VFR BLD AUTO: 37.6 % (ref 40–54)
HDLC SERPL-MCNC: 33 MG/DL (ref 40–60)
HGB BLD-MCNC: 12.8 G/DL (ref 13–18)
IMM GRANULOCYTES # BLD: 0 K/UL
IMM GRANULOCYTES NFR BLD: 0.4 % (ref 0–5)
LDLC SERPL CALC-MCNC: -6 MG/DL
LYMPHOCYTES # BLD: 1.4 K/UL (ref 1.5–4)
LYMPHOCYTES NFR BLD: 18.1 %
MCH RBC QN AUTO: 29.8 PG (ref 27–32)
MCHC RBC AUTO-ENTMCNC: 34 G/DL (ref 31–35)
MCV RBC AUTO: 87.4 FL (ref 80–100)
MONOCYTES # BLD: 0.5 K/UL (ref 0.2–0.8)
MONOCYTES NFR BLD: 6.5 %
NEUTROPHILS # BLD: 5.9 K/UL (ref 2–7.5)
NEUTS SEG NFR BLD: 73.8 %
PLATELET # BLD AUTO: 176 K/UL (ref 150–400)
PMV BLD AUTO: 10.5 FL (ref 6–10)
POTASSIUM SERPL-SCNC: 4.4 MMOL/L (ref 3.4–5.1)
PROT SERPL-MCNC: 7.2 G/DL (ref 6.4–8.3)
PSA SERPL DL<=0.01 NG/ML-MCNC: 1.77 NG/ML (ref 0–4)
RBC # BLD AUTO: 4.3 M/UL (ref 4.5–6)
SODIUM SERPL-SCNC: 139 MMOL/L (ref 136–145)
TRIGL SERPL-MCNC: 296 MG/DL (ref 0–249)
TSH SERPL DL<=0.005 MIU/L-ACNC: 3.42 UIU/ML (ref 0.27–4.2)
VIT B12 SERPL-MCNC: 622 PG/ML (ref 211–911)
VLDLC SERPL CALC-MCNC: 59 MG/DL
WBC # BLD AUTO: 8 K/UL (ref 4–11)

## 2023-09-20 PROCEDURE — 85025 COMPLETE CBC W/AUTO DIFF WBC: CPT

## 2023-09-20 PROCEDURE — 80053 COMPREHEN METABOLIC PANEL: CPT

## 2023-09-20 PROCEDURE — 84153 ASSAY OF PSA TOTAL: CPT

## 2023-09-20 PROCEDURE — 84443 ASSAY THYROID STIM HORMONE: CPT

## 2023-09-20 PROCEDURE — 80061 LIPID PANEL: CPT

## 2023-09-20 PROCEDURE — 82746 ASSAY OF FOLIC ACID SERUM: CPT

## 2023-09-20 PROCEDURE — 82607 VITAMIN B-12: CPT

## 2023-09-20 PROCEDURE — 83036 HEMOGLOBIN GLYCOSYLATED A1C: CPT

## 2023-10-10 ENCOUNTER — HOSPITAL ENCOUNTER (OUTPATIENT)
Facility: HOSPITAL | Age: 68
Setting detail: OBSERVATION
Discharge: HOME | End: 2023-10-11
Attending: FAMILY MEDICINE | Admitting: INTERNAL MEDICINE
Payer: MEDICARE

## 2023-10-10 ENCOUNTER — APPOINTMENT (OUTPATIENT)
Dept: GENERAL RADIOLOGY | Facility: HOSPITAL | Age: 68
End: 2023-10-10
Attending: FAMILY MEDICINE
Payer: MEDICARE

## 2023-10-10 ENCOUNTER — APPOINTMENT (OUTPATIENT)
Dept: CT IMAGING | Facility: HOSPITAL | Age: 68
End: 2023-10-10
Attending: FAMILY MEDICINE
Payer: MEDICARE

## 2023-10-10 DIAGNOSIS — I95.1 ORTHOSTATIC HYPOTENSION: Primary | ICD-10-CM

## 2023-10-10 DIAGNOSIS — R53.83 FATIGUE, UNSPECIFIED TYPE: ICD-10-CM

## 2023-10-10 DIAGNOSIS — E86.0 DEHYDRATION: ICD-10-CM

## 2023-10-10 PROBLEM — R19.7 DIARRHEA: Status: ACTIVE | Noted: 2023-10-10

## 2023-10-10 PROBLEM — R42 DIZZINESS: Status: ACTIVE | Noted: 2023-10-10

## 2023-10-10 LAB
25(OH)D3 SERPL-MCNC: 74.1 NG/ML (ref 32–100)
ALBUMIN SERPL-MCNC: 3.9 G/DL (ref 3.4–4.8)
ALBUMIN/GLOB SERPL: 1.5 {RATIO} (ref 0.8–2)
ALP SERPL-CCNC: 83 U/L (ref 25–100)
ALT SERPL-CCNC: 10 U/L (ref 4–36)
AMPHET UR QL SCN: NORMAL
ANION GAP SERPL CALCULATED.3IONS-SCNC: 12 MMOL/L (ref 3–16)
AST SERPL-CCNC: 17 U/L (ref 8–33)
BACTERIA URNS QL MICRO: ABNORMAL /HPF
BARBITURATES UR QL SCN: NORMAL
BASOPHILS # BLD: 0 K/UL (ref 0–0.1)
BASOPHILS NFR BLD: 0.5 %
BENZODIAZ UR QL SCN: NORMAL
BILIRUB SERPL-MCNC: 0.4 MG/DL (ref 0.3–1.2)
BILIRUB UR QL STRIP.AUTO: ABNORMAL
BILIRUB UR QL STRIP.AUTO: NEGATIVE
BUN SERPL-MCNC: 20 MG/DL (ref 6–20)
BUPRENORPHINE QUAL, URINE: NORMAL
CALCIUM SERPL-MCNC: 8.5 MG/DL (ref 8.5–10.5)
CANNABINOIDS UR QL SCN: NORMAL
CHLORIDE SERPL-SCNC: 102 MMOL/L (ref 98–107)
CK SERPL-CCNC: 96 U/L (ref 26–174)
CLARITY UR: CLEAR
CLARITY UR: CLEAR
CO2 SERPL-SCNC: 24 MMOL/L (ref 20–30)
COARSE GRAN CASTS #/AREA URNS LPF: ABNORMAL /LPF (ref 0–2)
COCAINE UR QL SCN: NORMAL
COLOR UR: YELLOW
COLOR UR: YELLOW
CREAT SERPL-MCNC: 1.6 MG/DL (ref 0.4–1.2)
DRUG SCREEN COMMENT UR-IMP: NORMAL
EOSINOPHIL # BLD: 0.1 K/UL (ref 0–0.4)
EOSINOPHIL NFR BLD: 0.6 %
EPI CELLS #/AREA URNS HPF: ABNORMAL /HPF (ref 0–5)
EPI CELLS #/AREA URNS HPF: ABNORMAL /HPF (ref 0–5)
ERYTHROCYTE [DISTWIDTH] IN BLOOD BY AUTOMATED COUNT: 13.2 % (ref 11–16)
FOLATE SERPL-MCNC: 11.31 NG/ML
GFR SERPLBLD CREATININE-BSD FMLA CKD-EPI: 47 ML/MIN/{1.73_M2}
GLOBULIN SER CALC-MCNC: 2.6 G/DL
GLUCOSE BLD-MCNC: 123 MG/DL (ref 74–106)
GLUCOSE BLD-MCNC: 241 MG/DL (ref 74–106)
GLUCOSE BLD-MCNC: 263 MG/DL (ref 74–106)
GLUCOSE SERPL-MCNC: 170 MG/DL (ref 74–106)
GLUCOSE UR STRIP.AUTO-MCNC: NEGATIVE MG/DL
GLUCOSE UR STRIP.AUTO-MCNC: NEGATIVE MG/DL
HCT VFR BLD AUTO: 33.7 % (ref 40–54)
HGB BLD-MCNC: 11.7 G/DL (ref 13–18)
HGB UR QL STRIP.AUTO: NEGATIVE
HGB UR QL STRIP.AUTO: NEGATIVE
HYALINE CASTS #/AREA URNS LPF: ABNORMAL /LPF (ref 0–2)
HYALINE CASTS #/AREA URNS LPF: ABNORMAL /LPF (ref 0–2)
IMM GRANULOCYTES # BLD: 0 K/UL
IMM GRANULOCYTES NFR BLD: 0.3 % (ref 0–5)
KETONES UR STRIP.AUTO-MCNC: ABNORMAL MG/DL
KETONES UR STRIP.AUTO-MCNC: NEGATIVE MG/DL
LEUKOCYTE ESTERASE UR QL STRIP.AUTO: NEGATIVE
LEUKOCYTE ESTERASE UR QL STRIP.AUTO: NEGATIVE
LYMPHOCYTES # BLD: 1.5 K/UL (ref 1.5–4)
LYMPHOCYTES NFR BLD: 18.1 %
MCH RBC QN AUTO: 30.3 PG (ref 27–32)
MCHC RBC AUTO-ENTMCNC: 34.7 G/DL (ref 31–35)
MCV RBC AUTO: 87.3 FL (ref 80–100)
METHADONE UR QL SCN: NORMAL
METHAMPHET UR QL SCN: NORMAL
MONOCYTES # BLD: 0.5 K/UL (ref 0.2–0.8)
MONOCYTES NFR BLD: 6.8 %
MUCOUS THREADS URNS QL MICRO: ABNORMAL /LPF
NEUTROPHILS # BLD: 5.9 K/UL (ref 2–7.5)
NEUTS SEG NFR BLD: 73.7 %
NITRITE UR QL STRIP.AUTO: NEGATIVE
NITRITE UR QL STRIP.AUTO: NEGATIVE
OPIATES UR QL SCN: NORMAL
OXYCODONE UR QL SCN: NORMAL
PCP UR QL SCN: NORMAL
PERFORMED ON: ABNORMAL
PH UR STRIP.AUTO: 5.5 [PH] (ref 5–8)
PH UR STRIP.AUTO: 5.5 [PH] (ref 5–8)
PLATELET # BLD AUTO: 139 K/UL (ref 150–400)
PMV BLD AUTO: 10 FL (ref 6–10)
POTASSIUM SERPL-SCNC: 3.8 MMOL/L (ref 3.4–5.1)
PROPOXYPH UR QL SCN: NORMAL
PROT SERPL-MCNC: 6.5 G/DL (ref 6.4–8.3)
PROT UR STRIP.AUTO-MCNC: 30 MG/DL
PROT UR STRIP.AUTO-MCNC: 30 MG/DL
RBC # BLD AUTO: 3.86 M/UL (ref 4.5–6)
RBC #/AREA URNS HPF: ABNORMAL /HPF (ref 0–4)
RBC #/AREA URNS HPF: ABNORMAL /HPF (ref 0–4)
SARS-COV-2 RDRP RESP QL NAA+PROBE: NOT DETECTED
SODIUM SERPL-SCNC: 138 MMOL/L (ref 136–145)
SP GR UR STRIP.AUTO: >=1.03 (ref 1–1.03)
SP GR UR STRIP.AUTO: >=1.03 (ref 1–1.03)
TRICYCLICS UR QL SCN: NORMAL
UA COMPLETE W REFLEX CULTURE PNL UR: ABNORMAL
UA COMPLETE W REFLEX CULTURE PNL UR: ABNORMAL
UA DIPSTICK W REFLEX MICRO PNL UR: YES
UA DIPSTICK W REFLEX MICRO PNL UR: YES
URN SPEC COLLECT METH UR: ABNORMAL
URN SPEC COLLECT METH UR: ABNORMAL
UROBILINOGEN UR STRIP-ACNC: 1 E.U./DL
UROBILINOGEN UR STRIP-ACNC: 2 E.U./DL
VIT B12 SERPL-MCNC: 438 PG/ML (ref 211–911)
WBC # BLD AUTO: 8 K/UL (ref 4–11)
WBC #/AREA URNS HPF: ABNORMAL /HPF (ref 0–5)
WBC #/AREA URNS HPF: ABNORMAL /HPF (ref 0–5)

## 2023-10-10 PROCEDURE — 96361 HYDRATE IV INFUSION ADD-ON: CPT

## 2023-10-10 PROCEDURE — 2580000003 HC RX 258: Performed by: FAMILY MEDICINE

## 2023-10-10 PROCEDURE — 6360000002 HC RX W HCPCS: Performed by: PHYSICIAN ASSISTANT

## 2023-10-10 PROCEDURE — 82607 VITAMIN B-12: CPT

## 2023-10-10 PROCEDURE — 6370000000 HC RX 637 (ALT 250 FOR IP): Performed by: FAMILY MEDICINE

## 2023-10-10 PROCEDURE — 87506 IADNA-DNA/RNA PROBE TQ 6-11: CPT

## 2023-10-10 PROCEDURE — 36415 COLL VENOUS BLD VENIPUNCTURE: CPT

## 2023-10-10 PROCEDURE — 93005 ELECTROCARDIOGRAM TRACING: CPT

## 2023-10-10 PROCEDURE — 96360 HYDRATION IV INFUSION INIT: CPT

## 2023-10-10 PROCEDURE — 71045 X-RAY EXAM CHEST 1 VIEW: CPT

## 2023-10-10 PROCEDURE — 82306 VITAMIN D 25 HYDROXY: CPT

## 2023-10-10 PROCEDURE — 85025 COMPLETE CBC W/AUTO DIFF WBC: CPT

## 2023-10-10 PROCEDURE — 70450 CT HEAD/BRAIN W/O DYE: CPT

## 2023-10-10 PROCEDURE — G0378 HOSPITAL OBSERVATION PER HR: HCPCS

## 2023-10-10 PROCEDURE — 87324 CLOSTRIDIUM AG IA: CPT

## 2023-10-10 PROCEDURE — 80307 DRUG TEST PRSMV CHEM ANLYZR: CPT

## 2023-10-10 PROCEDURE — 99285 EMERGENCY DEPT VISIT HI MDM: CPT

## 2023-10-10 PROCEDURE — 6370000000 HC RX 637 (ALT 250 FOR IP): Performed by: PHYSICIAN ASSISTANT

## 2023-10-10 PROCEDURE — 2580000003 HC RX 258: Performed by: PHYSICIAN ASSISTANT

## 2023-10-10 PROCEDURE — 81001 URINALYSIS AUTO W/SCOPE: CPT

## 2023-10-10 PROCEDURE — 96372 THER/PROPH/DIAG INJ SC/IM: CPT

## 2023-10-10 PROCEDURE — 82550 ASSAY OF CK (CPK): CPT

## 2023-10-10 PROCEDURE — 82746 ASSAY OF FOLIC ACID SERUM: CPT

## 2023-10-10 PROCEDURE — 87449 NOS EACH ORGANISM AG IA: CPT

## 2023-10-10 PROCEDURE — 87635 SARS-COV-2 COVID-19 AMP PRB: CPT

## 2023-10-10 PROCEDURE — 80053 COMPREHEN METABOLIC PANEL: CPT

## 2023-10-10 RX ORDER — INSULIN LISPRO 100 [IU]/ML
0-4 INJECTION, SOLUTION INTRAVENOUS; SUBCUTANEOUS
Status: DISCONTINUED | OUTPATIENT
Start: 2023-10-10 | End: 2023-10-11 | Stop reason: HOSPADM

## 2023-10-10 RX ORDER — POLYETHYLENE GLYCOL 3350 17 G/17G
17 POWDER, FOR SOLUTION ORAL DAILY PRN
Status: DISCONTINUED | OUTPATIENT
Start: 2023-10-10 | End: 2023-10-11 | Stop reason: HOSPADM

## 2023-10-10 RX ORDER — ONDANSETRON 4 MG/1
4 TABLET, ORALLY DISINTEGRATING ORAL EVERY 8 HOURS PRN
Status: DISCONTINUED | OUTPATIENT
Start: 2023-10-10 | End: 2023-10-11 | Stop reason: HOSPADM

## 2023-10-10 RX ORDER — SODIUM CHLOR/HYPOCHLOROUS ACID 0.033 %
SOLUTION, IRRIGATION IRRIGATION DAILY
Status: DISCONTINUED | OUTPATIENT
Start: 2023-10-10 | End: 2023-10-11 | Stop reason: HOSPADM

## 2023-10-10 RX ORDER — INSULIN LISPRO 100 [IU]/ML
12 INJECTION, SOLUTION INTRAVENOUS; SUBCUTANEOUS
COMMUNITY

## 2023-10-10 RX ORDER — LISINOPRIL AND HYDROCHLOROTHIAZIDE 25; 20 MG/1; MG/1
TABLET ORAL
Status: ON HOLD | COMMUNITY
Start: 2023-01-06 | End: 2023-10-11 | Stop reason: SDUPTHER

## 2023-10-10 RX ORDER — INSULIN LISPRO 100 [IU]/ML
INJECTION, SOLUTION INTRAVENOUS; SUBCUTANEOUS 3 TIMES DAILY
Status: ON HOLD | COMMUNITY
End: 2023-10-11

## 2023-10-10 RX ORDER — DONEPEZIL HYDROCHLORIDE 5 MG/1
10 TABLET, FILM COATED ORAL NIGHTLY
Status: DISCONTINUED | OUTPATIENT
Start: 2023-10-10 | End: 2023-10-11 | Stop reason: HOSPADM

## 2023-10-10 RX ORDER — ACETAMINOPHEN 325 MG/1
650 TABLET ORAL EVERY 6 HOURS PRN
Status: DISCONTINUED | OUTPATIENT
Start: 2023-10-10 | End: 2023-10-11 | Stop reason: HOSPADM

## 2023-10-10 RX ORDER — CITALOPRAM 40 MG/1
60 TABLET ORAL DAILY
COMMUNITY

## 2023-10-10 RX ORDER — DEXTROMETHORPHAN HYDROBROMIDE AND QUINIDINE SULFATE 20; 10 MG/1; MG/1
1 CAPSULE, GELATIN COATED ORAL DAILY
Status: ON HOLD | COMMUNITY
End: 2023-10-11

## 2023-10-10 RX ORDER — CHOLESTYRAMINE LIGHT 4 G/5.7G
4 POWDER, FOR SUSPENSION ORAL DAILY
Status: DISCONTINUED | OUTPATIENT
Start: 2023-10-10 | End: 2023-10-11 | Stop reason: HOSPADM

## 2023-10-10 RX ORDER — METRONIDAZOLE 500 MG/1
500 TABLET ORAL EVERY 8 HOURS SCHEDULED
Status: DISCONTINUED | OUTPATIENT
Start: 2023-10-10 | End: 2023-10-11 | Stop reason: HOSPADM

## 2023-10-10 RX ORDER — ERGOCALCIFEROL 1.25 MG/1
50000 CAPSULE ORAL WEEKLY
Status: ON HOLD | COMMUNITY
Start: 2023-07-27 | End: 2023-10-10

## 2023-10-10 RX ORDER — PRAMIPEXOLE 1.5 MG/1
1.5 TABLET, EXTENDED RELEASE ORAL DAILY
Status: DISCONTINUED | OUTPATIENT
Start: 2023-10-10 | End: 2023-10-11 | Stop reason: HOSPADM

## 2023-10-10 RX ORDER — DEXTROSE MONOHYDRATE 100 MG/ML
INJECTION, SOLUTION INTRAVENOUS CONTINUOUS PRN
Status: DISCONTINUED | OUTPATIENT
Start: 2023-10-10 | End: 2023-10-11 | Stop reason: HOSPADM

## 2023-10-10 RX ORDER — ERGOCALCIFEROL 1.25 MG/1
50000 CAPSULE ORAL WEEKLY
COMMUNITY

## 2023-10-10 RX ORDER — PRAMIPEXOLE 1.5 MG/1
1.5 TABLET, EXTENDED RELEASE ORAL DAILY
COMMUNITY

## 2023-10-10 RX ORDER — INSULIN GLARGINE 100 [IU]/ML
25 INJECTION, SOLUTION SUBCUTANEOUS NIGHTLY
Status: DISCONTINUED | OUTPATIENT
Start: 2023-10-10 | End: 2023-10-11 | Stop reason: HOSPADM

## 2023-10-10 RX ORDER — CLONAZEPAM 0.5 MG/1
1.5 TABLET ORAL NIGHTLY PRN
Status: DISCONTINUED | OUTPATIENT
Start: 2023-10-10 | End: 2023-10-11 | Stop reason: HOSPADM

## 2023-10-10 RX ORDER — 0.9 % SODIUM CHLORIDE 0.9 %
1000 INTRAVENOUS SOLUTION INTRAVENOUS ONCE
Status: COMPLETED | OUTPATIENT
Start: 2023-10-10 | End: 2023-10-10

## 2023-10-10 RX ORDER — DONEPEZIL HYDROCHLORIDE 23 MG/1
23 TABLET, FILM COATED ORAL NIGHTLY
COMMUNITY

## 2023-10-10 RX ORDER — CITALOPRAM 20 MG/1
40 TABLET ORAL DAILY
Status: DISCONTINUED | OUTPATIENT
Start: 2023-10-10 | End: 2023-10-11

## 2023-10-10 RX ORDER — PANTOPRAZOLE SODIUM 40 MG/1
40 TABLET, DELAYED RELEASE ORAL
Status: DISCONTINUED | OUTPATIENT
Start: 2023-10-11 | End: 2023-10-11 | Stop reason: HOSPADM

## 2023-10-10 RX ORDER — ACETAMINOPHEN 650 MG/1
650 SUPPOSITORY RECTAL EVERY 6 HOURS PRN
Status: DISCONTINUED | OUTPATIENT
Start: 2023-10-10 | End: 2023-10-11 | Stop reason: HOSPADM

## 2023-10-10 RX ORDER — IBUPROFEN 600 MG/1
1 TABLET ORAL PRN
Status: DISCONTINUED | OUTPATIENT
Start: 2023-10-10 | End: 2023-10-11 | Stop reason: HOSPADM

## 2023-10-10 RX ORDER — MONTELUKAST SODIUM 4 MG/1
1 TABLET, CHEWABLE ORAL
COMMUNITY
Start: 2023-10-02

## 2023-10-10 RX ORDER — MECLIZINE HCL 12.5 MG/1
25 TABLET ORAL ONCE
Status: COMPLETED | OUTPATIENT
Start: 2023-10-10 | End: 2023-10-10

## 2023-10-10 RX ORDER — CLONAZEPAM 1 MG/1
1.5 TABLET ORAL NIGHTLY PRN
COMMUNITY

## 2023-10-10 RX ORDER — GABAPENTIN 300 MG/1
300 CAPSULE ORAL
COMMUNITY
Start: 2023-10-02

## 2023-10-10 RX ORDER — OMEPRAZOLE 40 MG/1
CAPSULE, DELAYED RELEASE ORAL
COMMUNITY
Start: 2023-09-28

## 2023-10-10 RX ORDER — FLURBIPROFEN SODIUM 0.3 MG/ML
1 SOLUTION/ DROPS OPHTHALMIC 4 TIMES DAILY
COMMUNITY
Start: 2023-08-31

## 2023-10-10 RX ORDER — LACTOBACILLUS RHAMNOSUS GG 10B CELL
1 CAPSULE ORAL
Status: DISCONTINUED | OUTPATIENT
Start: 2023-10-11 | End: 2023-10-11 | Stop reason: HOSPADM

## 2023-10-10 RX ORDER — ONDANSETRON 2 MG/ML
4 INJECTION INTRAMUSCULAR; INTRAVENOUS EVERY 6 HOURS PRN
Status: DISCONTINUED | OUTPATIENT
Start: 2023-10-10 | End: 2023-10-11 | Stop reason: HOSPADM

## 2023-10-10 RX ORDER — SODIUM CHLORIDE 9 MG/ML
INJECTION, SOLUTION INTRAVENOUS CONTINUOUS
Status: DISCONTINUED | OUTPATIENT
Start: 2023-10-10 | End: 2023-10-11 | Stop reason: HOSPADM

## 2023-10-10 RX ORDER — ENOXAPARIN SODIUM 100 MG/ML
40 INJECTION SUBCUTANEOUS DAILY
Status: DISCONTINUED | OUTPATIENT
Start: 2023-10-10 | End: 2023-10-11 | Stop reason: HOSPADM

## 2023-10-10 RX ORDER — GABAPENTIN 300 MG/1
300 CAPSULE ORAL
Status: DISCONTINUED | OUTPATIENT
Start: 2023-10-10 | End: 2023-10-11 | Stop reason: HOSPADM

## 2023-10-10 RX ORDER — ROSUVASTATIN CALCIUM 20 MG/1
20 TABLET, COATED ORAL DAILY
Status: DISCONTINUED | OUTPATIENT
Start: 2023-10-10 | End: 2023-10-11 | Stop reason: HOSPADM

## 2023-10-10 RX ORDER — BLOOD SUGAR DIAGNOSTIC
1 STRIP MISCELLANEOUS 2 TIMES DAILY WITH MEALS
COMMUNITY
Start: 2023-08-31

## 2023-10-10 RX ORDER — INSULIN LISPRO 100 [IU]/ML
0-4 INJECTION, SOLUTION INTRAVENOUS; SUBCUTANEOUS NIGHTLY
Status: DISCONTINUED | OUTPATIENT
Start: 2023-10-10 | End: 2023-10-11 | Stop reason: HOSPADM

## 2023-10-10 RX ORDER — INSULIN LISPRO 100 [IU]/ML
5 INJECTION, SOLUTION INTRAVENOUS; SUBCUTANEOUS
Status: DISCONTINUED | OUTPATIENT
Start: 2023-10-10 | End: 2023-10-11 | Stop reason: HOSPADM

## 2023-10-10 RX ORDER — HONEY 100 %
PASTE (ML) TOPICAL DAILY
Status: DISCONTINUED | OUTPATIENT
Start: 2023-10-10 | End: 2023-10-11 | Stop reason: HOSPADM

## 2023-10-10 RX ADMIN — DONEPEZIL HYDROCHLORIDE 10 MG: 5 TABLET, FILM COATED ORAL at 20:18

## 2023-10-10 RX ADMIN — SODIUM CHLORIDE: 9 INJECTION, SOLUTION INTRAVENOUS at 14:14

## 2023-10-10 RX ADMIN — CHOLESTYRAMINE 4 G: 4 POWDER, FOR SUSPENSION ORAL at 14:11

## 2023-10-10 RX ADMIN — GABAPENTIN 300 MG: 300 CAPSULE ORAL at 20:18

## 2023-10-10 RX ADMIN — METRONIDAZOLE 500 MG: 500 TABLET ORAL at 21:58

## 2023-10-10 RX ADMIN — SODIUM CHLORIDE: 9 INJECTION, SOLUTION INTRAVENOUS at 12:45

## 2023-10-10 RX ADMIN — MECLIZINE 25 MG: 12.5 TABLET ORAL at 10:07

## 2023-10-10 RX ADMIN — METRONIDAZOLE 500 MG: 500 TABLET ORAL at 14:11

## 2023-10-10 RX ADMIN — INSULIN LISPRO 2 UNITS: 100 INJECTION, SOLUTION INTRAVENOUS; SUBCUTANEOUS at 17:17

## 2023-10-10 RX ADMIN — SODIUM CHLORIDE 1000 ML: 9 INJECTION, SOLUTION INTRAVENOUS at 10:07

## 2023-10-10 RX ADMIN — Medication: at 16:47

## 2023-10-10 RX ADMIN — ENOXAPARIN SODIUM 40 MG: 100 INJECTION SUBCUTANEOUS at 12:44

## 2023-10-10 RX ADMIN — SODIUM CHLORIDE 1000 ML: 9 INJECTION, SOLUTION INTRAVENOUS at 10:40

## 2023-10-10 RX ADMIN — CARBIDOPA AND LEVODOPA 1 TABLET: 25; 100 TABLET ORAL at 21:58

## 2023-10-10 RX ADMIN — CARBIDOPA AND LEVODOPA 1 TABLET: 25; 100 TABLET ORAL at 20:18

## 2023-10-10 RX ADMIN — INSULIN LISPRO 5 UNITS: 100 INJECTION, SOLUTION INTRAVENOUS; SUBCUTANEOUS at 17:16

## 2023-10-10 RX ADMIN — INSULIN GLARGINE 25 UNITS: 100 INJECTION, SOLUTION SUBCUTANEOUS at 20:23

## 2023-10-10 ASSESSMENT — PAIN - FUNCTIONAL ASSESSMENT: PAIN_FUNCTIONAL_ASSESSMENT: NONE - DENIES PAIN

## 2023-10-10 ASSESSMENT — LIFESTYLE VARIABLES
HOW MANY STANDARD DRINKS CONTAINING ALCOHOL DO YOU HAVE ON A TYPICAL DAY: PATIENT DOES NOT DRINK
HOW OFTEN DO YOU HAVE A DRINK CONTAINING ALCOHOL: NEVER

## 2023-10-10 ASSESSMENT — PATIENT HEALTH QUESTIONNAIRE - PHQ9
2. FEELING DOWN, DEPRESSED OR HOPELESS: 0
SUM OF ALL RESPONSES TO PHQ QUESTIONS 1-9: 0
SUM OF ALL RESPONSES TO PHQ9 QUESTIONS 1 & 2: 0
SUM OF ALL RESPONSES TO PHQ QUESTIONS 1-9: 0
1. LITTLE INTEREST OR PLEASURE IN DOING THINGS: 0

## 2023-10-10 NOTE — PROGRESS NOTES
Patient arrived to medical unit att. Vital signs taken and WNL. Terry MAHAJAN notified of patient arrival. Patient placed in bed with items within reach. Safety measures in place.

## 2023-10-10 NOTE — ED PROVIDER NOTES
[address] ENCOUNTER        Pt Name: Elias Romero  MRN: [id number]  [address] [facility] 1955  Date of evaluation: 10/10/2023  Provider: Rosangela Sparks MD  PCP: LESLY Yung CNP  Note Started: 9:34 AM EDT 10/10/23    CHIEF COMPLAINT       Chief Complaint   Patient presents with    Fatigue    Dizziness              HISTORY OF PRESENT ILLNESS: 1 or more Elements     History from : Patient and EMS    Limitations to history : None    Elias Romero is a 78 y.o. male who presents with a significant past medical history of vertigo and Parkinson's disease presents with generalized fatigue and dizziness that started yesterday. Patient was try to go to his PCPs office and they called EMS because patient was staggering. Patient states he has not passed out. Denies any headache no visual changes no focal neurological deficits no tinnitus nausea no vomiting no fevers no chills. Denies any chest pain no palpitations no shortness of breath. Denies any dysuria hematuria flank pain. States that he is tolerating p.o. well. Denies any ill contacts or recent travel no exposure to COVID-19. Nursing Notes were all reviewed and agreed with or any disagreements were addressed in the HPI. REVIEW OF SYSTEMS :      Review of Systems    All systems reviewed and negative except as in HPI/MDM    SURGICAL HISTORY     Past Surgical History:   Procedure Laterality Date    BRAIN SURGERY  03/2018    DBS system     DEEP BRAIN STIMULATOR PLACEMENT      KNEE ARTHROPLASTY Left        CURRENTMEDICATIONS       Previous Medications    CARBIDOPA-LEVODOPA (SINEMET)  MG PER TABLET    Take 0.5 tablets by mouth in the morning and 0.5 tablets in the evening. CHOLECALCIFEROL (VITAMIN D PO)    Take 1.25 mg by mouth Takes on sunday    CITALOPRAM (CELEXA) 20 MG TABLET    Take 1 tablet by mouth daily    CLONAZEPAM (KLONOPIN) 1 MG TABLET    Take 1 tablet by mouth nightly as needed.  Max AM      PATIENT REFERRED TO:  No follow-up provider specified. DISCHARGE MEDICATIONS:  New Prescriptions    No medications on file       DISCONTINUED MEDICATIONS:  Discontinued Medications    No medications on file              (Please note that portions of this note were completed with a voice recognition program.  Efforts were made to edit the dictations but occasionally words are mis-transcribed. )    Sarah Holbrook MD (electronically signed)           Sarah Holbrook MD  10/10/23 6051

## 2023-10-10 NOTE — PROGRESS NOTES
4 Eyes Skin Assessment     NAME:  Simi Gee  YOB: 1955  MEDICAL RECORD NUMBER:  [id number]    The patient is being assessed for  Admission    I agree that at least one RN has performed a thorough Head to Toe Skin Assessment on the patient. ALL assessment sites listed below have been assessed. Areas assessed by both nurses:    Head, Face, Ears, Shoulders, Back, Chest, Arms, Elbows, Hands, Sacrum. Buttock, Coccyx, Ischium, and Legs. Feet and Heels        Does the Patient have a Wound? Yes wound(s) were present on assessment.  LDA wound assessment was Initiated and completed by RN       Arias Prevention initiated by RN: Yes  Wound Care Orders initiated by RN: Yes    Pressure Injury (Stage 3,4, Unstageable, DTI, NWPT, and Complex wounds) if present, place Wound referral order by RN under : Yes    New Ostomies, if present place, Ostomy referral order under : No     Nurse 1 eSignature: Electronically signed by Yohan Schwartz LPN on 41/73/26 at 79:89 PM EDT    **SHARE this note so that the co-signing nurse can place an eSignature**    Nurse 2 eSignature: Electronically signed by Kristy Espino RN on 10/10/23 at 1:21 PM EDT

## 2023-10-10 NOTE — PROGRESS NOTES
Medication Reconciliation completed with fill history from [facility] and [facility].  -added Donepezil 23 mg qhs last filled 9/1 90 day supply from [place]  -added Nuedexta 20-0 qd last filled 9/28 30 day supply from [place]  -added Citalopram 40 mg 1 1/2 tablet qd last filled 7/27 90 day supply  -added Clonazepam 1 mg 1 1/2 tablet qd last filled 9/20 30 day supply.  -changed Colestipol to 1 gm po TID with meals  -added gabapentin 300 mg po at bedtime  -added pramipexole ER 1.5 mg po daily  -added Carbidopa/Levodopa 25mg/100mg 1 po 5 times daily (last filled 5/2023 for 3 month supply as patient said he still takes)

## 2023-10-10 NOTE — FLOWSHEET NOTE
10/10/23 1145   Assessment   Charting Type Admission   Psychosocial   Psychosocial (WDL) WDL   Neurological   Neuro (WDL) WDL   Level of Consciousness 0   Orientation Level Oriented X4   Cognition Appropriate safety awareness; Appropriate judgement; Appropriate attention/concentration; Appropriate for developmental age; Follows commands   Speech Clear   R Hand  Strong   L Hand  Strong   Metter Coma Scale   Eye Opening 4   Best Verbal Response 5   Best Motor Response 6   Metter Coma Scale Score 15   HEENT (Head, Ears, Eyes, Nose, & Throat)   HEENT (WDL) X   Teeth Dentures upper   Respiratory   Respiratory (WDL) WDL   Respiratory Interventions Cough & deep breathe;H. O.B. elevated   Cardiac   Cardiac (WDL) WDL   Cardiac Regularity Regular   Heart Sounds S1, S2   Cardiac Rhythm Sinus rhythm   Rhythm Interpretation   Pulse 66   Cardiac Monitor   Cardiac/Telemetry Monitor On Portable telemetry pack applied   Telemetry Box Number [id number]   Alarm Audible Yes   Alarms Set Yes   Electrodes Replaced Yes   Bedside Monitor Alarm Parameters 50/120   Telemetry Monitor Alarm Parameters 50/120   Pacemaker   Pacemaker No   Gastrointestinal   Abdominal (WDL) X   Abdomen Inspection Distended;Rotund   GI Symptoms Diarrhea   Last BM (including prior to admit) 10/10/23   RUQ Bowel Sounds Active   LUQ Bowel Sounds Active   RLQ Bowel Sounds Active   LLQ Bowel Sounds Active   Genitourinary   Genitourinary (WDL) WDL   Peripheral Vascular   Peripheral Vascular (WDL) WDL   Edema None   Dual Clinician Skin Assessment   Dual Skin Assessment (4 Eyes) X   Second Clinical  (First and Last Name) Monico Batista   Pressure Injury Documentation Pressure Injury Noted-See Wound LDA to Document   Skin Integumentary    Skin Integumentary (WDL) X   Skin Color Pink   Skin Condition/Temp Warm   Skin Integrity Wound (see LDA)   Location Right foot, 3rd toe   Wound 10/10/23 Toe (Comment  which one) Right   Date First Assessed/Time First Assessed:

## 2023-10-10 NOTE — FLOWSHEET NOTE
10/10/23 1145   Wound 10/10/23 Toe (Comment  which one) Right   Date First Assessed/Time First Assessed: 10/10/23 1234   Present on Original Admission: Yes  Primary Wound Type: Diabetic Ulcer  Location: (c) Toe (Comment  which one)  Wound Location Orientation: Right   Wound Image     Wound Etiology Diabetic   Wound Cleansed Vashe   Wound Length (cm) 2 cm   Wound Width (cm) 3 cm   Wound Surface Area (cm^2) 6 cm^2   Wound Assessment Pink/red; Other (Comment)  (flaky, callus, small brown area)   Drainage Amount Scant (moist but unmeasurable)   Odor None   Martha-wound Assessment Warm;Dry/flaky     In to assess patient wound, patient states that wound has been in place for around 9 months and that he regularly goes to Dr. Denson Callas (podiatry) to have the wound \"shaved down\" and to have his toenails trimmed. States that he has diabetic foot wear and wears them around 4 days/week. States that he just went to see Dr. Denson Callas yesterday. States that Dr. Denson Callas has mentioned surgery to flatten his toes out. States that his blood sugars run around 100 in the mornings. And his most recent A1C was 5.4, per records A1C was 5.9 in September 2023. Patient denied any pain while assessing and cleansing wound. Requested records from Dr. Shanelle Cooper office. See orders for further management.

## 2023-10-11 VITALS
RESPIRATION RATE: 16 BRPM | SYSTOLIC BLOOD PRESSURE: 123 MMHG | HEART RATE: 66 BPM | WEIGHT: 210 LBS | HEIGHT: 70 IN | TEMPERATURE: 97.7 F | BODY MASS INDEX: 30.06 KG/M2 | DIASTOLIC BLOOD PRESSURE: 72 MMHG | OXYGEN SATURATION: 98 %

## 2023-10-11 PROBLEM — N17.9 ACUTE RENAL FAILURE (ARF) (HCC): Status: ACTIVE | Noted: 2023-10-11

## 2023-10-11 LAB
ALBUMIN SERPL-MCNC: 3.7 G/DL (ref 3.4–4.8)
ALBUMIN/GLOB SERPL: 1.7 {RATIO} (ref 0.8–2)
ALP SERPL-CCNC: 74 U/L (ref 25–100)
ALT SERPL-CCNC: <5 U/L (ref 4–36)
ANION GAP SERPL CALCULATED.3IONS-SCNC: 8 MMOL/L (ref 3–16)
AST SERPL-CCNC: 13 U/L (ref 8–33)
BASOPHILS # BLD: 0 K/UL (ref 0–0.1)
BASOPHILS NFR BLD: 0.3 %
BILIRUB SERPL-MCNC: 0.4 MG/DL (ref 0.3–1.2)
BUN SERPL-MCNC: 17 MG/DL (ref 6–20)
CALCIUM SERPL-MCNC: 8.1 MG/DL (ref 8.5–10.5)
CHLORIDE SERPL-SCNC: 102 MMOL/L (ref 98–107)
CO2 SERPL-SCNC: 26 MMOL/L (ref 20–30)
CREAT SERPL-MCNC: 1.2 MG/DL (ref 0.4–1.2)
EOSINOPHIL # BLD: 0.1 K/UL (ref 0–0.4)
EOSINOPHIL NFR BLD: 0.8 %
ERYTHROCYTE [DISTWIDTH] IN BLOOD BY AUTOMATED COUNT: 13.2 % (ref 11–16)
FERRITIN SERPL IA-MCNC: 95.7 NG/ML (ref 22–322)
GFR SERPLBLD CREATININE-BSD FMLA CKD-EPI: >60 ML/MIN/{1.73_M2}
GLOBULIN SER CALC-MCNC: 2.2 G/DL
GLUCOSE BLD-MCNC: 142 MG/DL (ref 74–106)
GLUCOSE BLD-MCNC: 226 MG/DL (ref 74–106)
GLUCOSE SERPL-MCNC: 172 MG/DL (ref 74–106)
HCT VFR BLD AUTO: 32.7 % (ref 40–54)
HGB BLD-MCNC: 11.2 G/DL (ref 13–18)
IMM GRANULOCYTES # BLD: 0 K/UL
IMM GRANULOCYTES NFR BLD: 0.3 % (ref 0–5)
IRON SATN MFR SERPL: 23 % (ref 20–50)
IRON SERPL-MCNC: 52 UG/DL (ref 59–158)
LYMPHOCYTES # BLD: 1.5 K/UL (ref 1.5–4)
LYMPHOCYTES NFR BLD: 23.1 %
MCH RBC QN AUTO: 29.8 PG (ref 27–32)
MCHC RBC AUTO-ENTMCNC: 34.3 G/DL (ref 31–35)
MCV RBC AUTO: 87 FL (ref 80–100)
MONOCYTES # BLD: 0.5 K/UL (ref 0.2–0.8)
MONOCYTES NFR BLD: 7 %
NEUTROPHILS # BLD: 4.4 K/UL (ref 2–7.5)
NEUTS SEG NFR BLD: 68.5 %
PERFORMED ON: ABNORMAL
PERFORMED ON: ABNORMAL
PLATELET # BLD AUTO: 111 K/UL (ref 150–400)
PMV BLD AUTO: 10.4 FL (ref 6–10)
POTASSIUM SERPL-SCNC: 3.9 MMOL/L (ref 3.4–5.1)
PROT SERPL-MCNC: 5.9 G/DL (ref 6.4–8.3)
RBC # BLD AUTO: 3.76 M/UL (ref 4.5–6)
SODIUM SERPL-SCNC: 136 MMOL/L (ref 136–145)
TIBC SERPL-MCNC: 228 UG/DL (ref 250–450)
TSH SERPL-MCNC: 2.47 UIU/ML (ref 0.27–4.2)
WBC # BLD AUTO: 6.4 K/UL (ref 4–11)

## 2023-10-11 PROCEDURE — 96361 HYDRATE IV INFUSION ADD-ON: CPT

## 2023-10-11 PROCEDURE — G0378 HOSPITAL OBSERVATION PER HR: HCPCS

## 2023-10-11 PROCEDURE — 97802 MEDICAL NUTRITION INDIV IN: CPT

## 2023-10-11 PROCEDURE — 83550 IRON BINDING TEST: CPT

## 2023-10-11 PROCEDURE — 97165 OT EVAL LOW COMPLEX 30 MIN: CPT

## 2023-10-11 PROCEDURE — 6370000000 HC RX 637 (ALT 250 FOR IP): Performed by: PHYSICIAN ASSISTANT

## 2023-10-11 PROCEDURE — 84443 ASSAY THYROID STIM HORMONE: CPT

## 2023-10-11 PROCEDURE — 83540 ASSAY OF IRON: CPT

## 2023-10-11 PROCEDURE — 97535 SELF CARE MNGMENT TRAINING: CPT

## 2023-10-11 PROCEDURE — 82728 ASSAY OF FERRITIN: CPT

## 2023-10-11 PROCEDURE — 6360000002 HC RX W HCPCS: Performed by: PHYSICIAN ASSISTANT

## 2023-10-11 PROCEDURE — 36415 COLL VENOUS BLD VENIPUNCTURE: CPT

## 2023-10-11 PROCEDURE — 97161 PT EVAL LOW COMPLEX 20 MIN: CPT

## 2023-10-11 PROCEDURE — 2580000003 HC RX 258: Performed by: PHYSICIAN ASSISTANT

## 2023-10-11 PROCEDURE — 96372 THER/PROPH/DIAG INJ SC/IM: CPT

## 2023-10-11 PROCEDURE — 85025 COMPLETE CBC W/AUTO DIFF WBC: CPT

## 2023-10-11 PROCEDURE — 80053 COMPREHEN METABOLIC PANEL: CPT

## 2023-10-11 PROCEDURE — 97116 GAIT TRAINING THERAPY: CPT

## 2023-10-11 RX ORDER — METRONIDAZOLE 500 MG/1
500 TABLET ORAL EVERY 8 HOURS SCHEDULED
Qty: 15 TABLET | Refills: 0 | Status: SHIPPED | OUTPATIENT
Start: 2023-10-11 | End: 2023-10-16

## 2023-10-11 RX ORDER — FERROUS SULFATE TAB EC 324 MG (65 MG FE EQUIVALENT) 324 (65 FE) MG
324 TABLET DELAYED RESPONSE ORAL
Status: DISCONTINUED | OUTPATIENT
Start: 2023-10-12 | End: 2023-10-11 | Stop reason: HOSPADM

## 2023-10-11 RX ORDER — SODIUM CHLOR/HYPOCHLOROUS ACID 0.033 %
1 SOLUTION, IRRIGATION IRRIGATION DAILY
Qty: 475 ML | Refills: 0 | Status: SHIPPED | OUTPATIENT
Start: 2023-10-12

## 2023-10-11 RX ORDER — LACTOBACILLUS RHAMNOSUS GG 10B CELL
1 CAPSULE ORAL
Qty: 30 CAPSULE | Refills: 0 | Status: SHIPPED | OUTPATIENT
Start: 2023-10-12

## 2023-10-11 RX ORDER — CITALOPRAM 20 MG/1
60 TABLET ORAL DAILY
Status: DISCONTINUED | OUTPATIENT
Start: 2023-10-11 | End: 2023-10-11 | Stop reason: HOSPADM

## 2023-10-11 RX ORDER — LISINOPRIL AND HYDROCHLOROTHIAZIDE 25; 20 MG/1; MG/1
0.5 TABLET ORAL DAILY
Qty: 30 TABLET | Refills: 0 | Status: SHIPPED
Start: 2023-10-13

## 2023-10-11 RX ORDER — FERROUS SULFATE TAB EC 324 MG (65 MG FE EQUIVALENT) 324 (65 FE) MG
324 TABLET DELAYED RESPONSE ORAL
Qty: 30 TABLET | Refills: 0 | Status: SHIPPED | OUTPATIENT
Start: 2023-10-12

## 2023-10-11 RX ORDER — HONEY 100 %
1 PASTE (ML) TOPICAL DAILY
Qty: 44 ML | Refills: 0 | Status: SHIPPED | OUTPATIENT
Start: 2023-10-12

## 2023-10-11 RX ORDER — DEXTROMETHORPHAN HYDROBROMIDE AND QUINIDINE SULFATE 20; 10 MG/1; MG/1
1 CAPSULE, GELATIN COATED ORAL 2 TIMES DAILY
COMMUNITY
Start: 2023-04-26

## 2023-10-11 RX ADMIN — CARBIDOPA AND LEVODOPA 1 TABLET: 25; 100 TABLET ORAL at 10:14

## 2023-10-11 RX ADMIN — Medication: at 10:05

## 2023-10-11 RX ADMIN — ROSUVASTATIN 20 MG: 20 TABLET, FILM COATED ORAL at 08:34

## 2023-10-11 RX ADMIN — CITALOPRAM HYDROBROMIDE 60 MG: 20 TABLET ORAL at 08:49

## 2023-10-11 RX ADMIN — ENOXAPARIN SODIUM 40 MG: 100 INJECTION SUBCUTANEOUS at 08:34

## 2023-10-11 RX ADMIN — METRONIDAZOLE 500 MG: 500 TABLET ORAL at 06:12

## 2023-10-11 RX ADMIN — PANTOPRAZOLE SODIUM 40 MG: 40 TABLET, DELAYED RELEASE ORAL at 06:12

## 2023-10-11 RX ADMIN — INSULIN LISPRO 5 UNITS: 100 INJECTION, SOLUTION INTRAVENOUS; SUBCUTANEOUS at 11:50

## 2023-10-11 RX ADMIN — SODIUM CHLORIDE: 9 INJECTION, SOLUTION INTRAVENOUS at 06:15

## 2023-10-11 RX ADMIN — Medication 1 CAPSULE: at 08:34

## 2023-10-11 RX ADMIN — INSULIN LISPRO 1 UNITS: 100 INJECTION, SOLUTION INTRAVENOUS; SUBCUTANEOUS at 11:50

## 2023-10-11 RX ADMIN — CARBIDOPA AND LEVODOPA 1 TABLET: 25; 100 TABLET ORAL at 06:11

## 2023-10-11 RX ADMIN — Medication: at 10:13

## 2023-10-11 RX ADMIN — CHOLESTYRAMINE 4 G: 4 POWDER, FOR SUSPENSION ORAL at 10:05

## 2023-10-11 RX ADMIN — INSULIN LISPRO 5 UNITS: 100 INJECTION, SOLUTION INTRAVENOUS; SUBCUTANEOUS at 08:46

## 2023-10-11 NOTE — PROGRESS NOTES
Occupational Therapy  Facility/Department: [facility]  Occupational Therapy Initial Assessment    Name: Olvin Dates  : 1955  MRN: [id number]  Date of Service: 10/11/2023    Discharge Recommendations:     OT Equipment Recommendations  Equipment Needed: No       Patient Diagnosis(es): The primary encounter diagnosis was Orthostatic hypotension. Diagnoses of Dehydration and Fatigue, unspecified type were also pertinent to this visit. Past Medical History:  has a past medical history of Diabetes mellitus ([address]), GERD (gastroesophageal reflux disease), Hyperlipidemia, Parkinson disease, and Vertigo. Past Surgical History:  has a past surgical history that includes Knee Arthroplasty (Left); brain surgery (2018); and Deep brain stimulator placement. Assessment   Performance deficits / Impairments: Decreased balance  Assessment: This 78year old male was referred to OT services upon admission following onset of Dizziness. Pt transferred to sitting at EOB with MOD I. Pt sat unsupported at EOB. Pt is alert and oriented x3. Pt able to sit, standing without reports of dizziness. Pt is pleasant and cooperative. Pt brief soiled upon entry pt reports he had called out and no one had come to assist and he didn't want to get up unassisted. Pt come to stand with SBA. Pt ambulated to bathroom and doffed brief with MOD I. Pt completed toileting tasks with MOD I. Pt washed hands with MOD I standing at sink. Pt ambulated 400 feet with RW SBA. Pt tolerated well. Pt with no LOB. pt initially started with cane he typically uses at home but was provided with RW. (Pt has RW at home) Pt demo improved stability with ambulation with RW. Pt educated on use of RW to decrease risk of falls. Pt appears to be at baseline functional level. No skilled OT services warranted at this time. Prognosis: Good  Decision Making: Low Complexity  No Skilled OT: At baseline function; Safe to return home  REQUIRES OT FOLLOW-UP: No  Activity Tolerance  Activity Tolerance: Patient Tolerated treatment well        Plan         Restrictions  Restrictions/Precautions  Restrictions/Precautions: Fall Risk, General Precautions  Required Braces or Orthoses?: No    Subjective   General  Chart Reviewed: Yes  Patient assessed for rehabilitation services?: Yes  Family / Caregiver Present: No  Referring Practitioner: Wilian Mace PA-C  Diagnosis: Dizziness  Subjective  Subjective: Pt agreeable to OT services. No c/o pain. Social/Functional History  Social/Functional History  Lives With: Alone  Type of Home: Apartment  Home Layout: One level  Home Access: Level entry  Bathroom Shower/Tub: Tub/Shower unit  Bathroom Toilet: Standard  Bathroom Equipment: Grab bars in shower  Bathroom Accessibility: Walker accessible  Home Equipment: Walker, rolling, Wheelchair-electric, [address] bed (walking stick)  Has the patient had two or more falls in the past year or any fall with injury in the past year?: Yes  Receives Help From: Family  ADL Assistance: [address] Independent  Homemaking Responsibilities: Yes  Ambulation Assistance: Independent (community ambulator with cane)  Transfer Assistance: Independent  Active : No  Patient's  Info: Brother assists with transportation       Objective   Pulse: 66  Heart Rate Source: Monitor  BP: 123/72  BP Location: Left upper arm  MAP (Calculated): 89  Respirations: 16  SpO2: 98 %  O2 Device: None (Room air)          Observation/Palpation  Observation: Room air, NAD, brief and arjun soiled upon entry,     Balance  Sitting: Intact  Standing: Intact  Gait  Overall Level of Assistance: Stand-by assistance  Distance (ft): 400 Feet  Assistive Device: Gait belt;Walker, rolling     AROM: Within functional limits  PROM: Within functional limits  Strength:  Within functional limits  Coordination: Within functional limits  Tone: Normal  Sensation: Intact  ADL  LE Dressing:

## 2023-10-11 NOTE — ACP (ADVANCE CARE PLANNING)
Advance Care Planning     General Advance Care Planning (ACP) Conversation    Date of Conversation: 10/11/2023  Conducted with: Patient with Decision Making Capacity    Healthcare Decision Maker:    Primary Decision Maker: Donnie Staples - [phone]  Click here to complete [address] including selection of the Healthcare Decision Maker Relationship (ie \"Primary\"). Today we documented Decision Maker(s) consistent with Legal Next of Kin hierarchy.     Content/Action Overview:  Has NO ACP documents/care preferences - information provided, considering goals and options  Reviewed DNR/DNI and patient elects Full Code (Attempt Resuscitation)  artificial nutrition, ventilation preferences, and resuscitation preferences      Length of Voluntary ACP Conversation in minutes:  <16 minutes (Non-Billable)    Monisha Mcfarland

## 2023-10-11 NOTE — PROGRESS NOTES
Physical Therapy  Facility/Department: [facility]  Physical Therapy Initial Assessment/Discharge Summary    Name: Yeison Schmidt  : 1955  MRN: [id number]  Date of Service: 10/11/2023    Discharge Recommendations:  Home with assist PRN          Patient Diagnosis(es): The primary encounter diagnosis was Orthostatic hypotension. Diagnoses of Dehydration and Fatigue, unspecified type were also pertinent to this visit. Past Medical History:  has a past medical history of Diabetes mellitus ([address]), GERD (gastroesophageal reflux disease), Hyperlipidemia, Parkinson disease, and Vertigo. Past Surgical History:  has a past surgical history that includes Knee Arthroplasty (Left); brain surgery (2018); and Deep brain stimulator placement. Assessment   Assessment: PT eval completed on this patient admitted to hospital with primary diagnosis of dizziness. He is received lying in bed on RA. NAD. Pleasant and cooperative. Received with arjun and depend soiled. Patient stated he rang for help to bathroom but no one came. Orthostatic BP taken and found to be: 123/71 supine, 122/70 sitting EOB, 123/66 standing. No reported dizziness with change of position today. Patient is able to perform bed mobility with mod I. Sit to stand, transfers and able to ambulate 400' with walking stick/RW. Ambulated into bathroom with walking stick and was noted to be unsteady and unsafe. Able to complete hygiene tasks and toilet transfer with SBA. Completed most of ambulation with RW with improved balance, safety and endurance noted. Slight right foot drop/drag with ambulation. Patient returned to his room and left sitting up in chair with alarm and brother present at bedside. Patient with no reports of dizziness today with upright posture or changes of position. He appears to be at his baseline functional level and does not require continued skilled PT intervention. Recommend use of RW vs cane for mobility for improved balance.   Therapy

## 2023-10-11 NOTE — DISCHARGE INSTRUCTIONS
Disposition: home    Discharged Condition: Stable    Activity: activity as tolerated with walker    Diet: cardiac diet and diabetic diet    Follow Up: Primary Care Provider in one week  Monitor your blood pressure everyday and write down readings to take to your primary care provider. Due to low blood pressure and orthostatic hypotension your lisinopril hctz pill was held and will discharge on half tablet daily starting Friday. If systolic blood pressure staying higher than 160 can restart taking whole tablet daily and discuss with pcp.

## 2023-10-11 NOTE — PLAN OF CARE
Problem: Discharge Planning  Goal: Discharge to home or other facility with appropriate resources  10/10/2023 2148 by Ricky Mae RN  Outcome: Progressing  10/10/2023 1204 by Annemarie Stallings RN  Outcome: Progressing  Flowsheets (Taken 10/10/2023 1145 by Sandra Hemphill LPN)  Discharge to home or other facility with appropriate resources: Identify barriers to discharge with patient and caregiver     Problem: Safety - Adult  Goal: Free from fall injury  10/10/2023 2148 by Ricky Mae RN  Outcome: Progressing  10/10/2023 1204 by Annemarie Stallings RN  Outcome: Progressing     Problem: ABCDS Injury Assessment  Goal: Absence of physical injury  10/10/2023 2148 by Ricky Mae RN  Outcome: Progressing  10/10/2023 1204 by Annemarie Stallings RN  Outcome: Progressing     Problem: Chronic Conditions and Co-morbidities  Goal: Patient's chronic conditions and co-morbidity symptoms are monitored and maintained or improved  Outcome: Progressing

## 2023-10-11 NOTE — H&P
Short Stay Summary      Patient ID: Lynn Cloes       Patient's PCP: Katharine Cooper, APRN - CNP    Admit Date: 10/10/2023     Discharge Date:   10/11/2023    Admitting Physician: Eugenie Jacques MD    Discharge Physician: ZAINA Padgett     Reason for this admission:   Dizziness and orthostatic hypotension     Discharge Diagnoses: Active Hospital Problems    Diagnosis Date Noted    Acute renal failure (ARF) ([address]) [N17.9] 10/11/2023    Dizziness [R42] 10/10/2023    Diarrhea [R19.7] 10/10/2023    Parkinson disease [G20. A1]     Orthostatic hypotension [I95.1]     Diabetes mellitus ([address]) [E11.9]        Procedures:  XR CHEST PORTABLE   Final Result      No acute radiographic abnormality of the chest.      CT Head WO Contrast   Final Result      No acute intracranial hemorrhage or mass effect. Consults:   IP CONSULT TO DIETITIAN  PT/OT    HISTORY OF PRESENT ILLNESS:   The patient is a 78 y.o. male with PMH of parkinson disease, DM, HTN, and others who presents due to dizziness and diarrhea. Patient states he has had diarrhea for last 2-3 days. No fever that he knows of. Did have some abdominal cramping. No known sick contacts. He felt ok when he first woke up yesterday morning but then felt weak and dizzy when he was at his primary care office for a checkup so he was sent to the ER. On arrival to ER in acute renal failure and orthostatic with dizziness. Admitted for further management. Today states he feels much better and wants to go home. Feels like diarrhea improving. He was able to walk in the hallway without dizziness. Review of system  Constitutional:  Denies fever or chills. Eyes:  Denies change in visual acuity or discharge. HENT:  Denies nasal congestion or sore throat. Respiratory:  Denies cough or shortness of breath. Cardiovascular:  Denies chest pain, palpitation or swelling in LEs. GI:  Denies abdominal pain, nausea, vomiting, bloody stools. Positive for diarrhea.    : meals)      B-D UF III MINI PEN NEEDLES 31G X 5 MM MISC Inject 1 each into the skin 4 times daily      insulin glargine, 1 unit dial, (TOUJEO) 300 UNIT/ML concentrated injection pen Inject into the skin 45 units at night SQ      rosuvastatin (CRESTOR) 20 MG tablet Take 1 tablet by mouth daily      metFORMIN (GLUCOPHAGE) 1000 MG tablet Take 1 tablet by mouth 2 times daily (with meals)               Patient was seen and examined with Dr. Brittney Chambers. Assessment and plan formulated by Dr. Brittney Chambers. Signed:  Electronically signed by ZAINA Reddy on 10/11/2023 at 1:52 PM       Thank you LESLY Mcclure CNP for the opportunity to be involved in this patient's care. If you have any questions or concerns please feel free to contact me at [phone].

## 2023-10-11 NOTE — FLOWSHEET NOTE
10/11/23 1015   Assessment   Charting Type Shift assessment   Psychosocial   Psychosocial (WDL) WDL   Neurological   Neuro (WDL) WDL   Level of Consciousness 0   Orientation Level Oriented X4   Speech Clear   [place] Coma Scale   Eye Opening 4   Best Verbal Response 5   Best Motor Response 6   [place] Coma Scale Score 15   HEENT (Head, Ears, Eyes, Nose, & Throat)   HEENT (WDL) X   Teeth Dentures upper   Right Ear Impaired hearing   Left Ear Impaired hearing   Respiratory   Respiratory (WDL) WDL   Respiratory Interventions Cough & deep breathe   Cardiac   Cardiac (WDL) WDL   Cardiac Regularity Regular   Heart Sounds S1, S2   Cardiac Rhythm Sinus rhythm   Cardiac Monitor   Cardiac/Telemetry Monitor On Portable telemetry pack applied   Telemetry Box Number [id number]   Alarm Audible Yes   Alarms Set No   Telemetry Monitor Alarm Parameters 50/120   Gastrointestinal   Abdominal (WDL) X   Abdomen Inspection Distended   Last BM (including prior to admit) 10/10/23   RUQ Bowel Sounds Active   LUQ Bowel Sounds Active   RLQ Bowel Sounds Active   LLQ Bowel Sounds Active   Genitourinary   Genitourinary (WDL) WDL   Urine Assessment   Urinary Status Voiding   Peripheral Vascular   Peripheral Vascular (WDL) X   Edema Left lower extremity;Right lower extremity   RLE Edema +1;Pitting   LLE Edema +1;Pitting   Skin Integumentary    Skin Integumentary (WDL) X   Skin Color Pink   Skin Condition/Temp Warm   Skin Integrity Wound (see LDA)   Location right third toe   Wound 10/10/23 Toe (Comment  which one) Right   Date First Assessed/Time First Assessed: 10/10/23 1234   Present on Original Admission: Yes  Primary Wound Type: Diabetic Ulcer  Location: (c) Toe (Comment  which one)  Wound Location Orientation: Right   Wound Etiology Diabetic   Dressing Status New dressing applied   Wound Cleansed Vashe   Dressing/Treatment Honey gel/honey paste; Adhesive bandage   Wound Assessment Pink/red   Drainage Amount Scant (moist but unmeasurable)   Odor

## 2023-10-11 NOTE — PLAN OF CARE
Problem: Discharge Planning  Goal: Discharge to home or other facility with appropriate resources  10/11/2023 1210 by Monico Batista RN  Outcome: Adequate for Discharge  10/11/2023 1122 by Monico Batista RN  Outcome: Progressing  Flowsheets (Taken 10/11/2023 1015)  Discharge to home or other facility with appropriate resources:   Identify barriers to discharge with patient and caregiver   Identify discharge learning needs (meds, wound care, etc)     Problem: Safety - Adult  Goal: Free from fall injury  10/11/2023 1210 by Monico Batista RN  Outcome: Adequate for Discharge  10/11/2023 1122 by Monico Batista RN  Outcome: Progressing     Problem: ABCDS Injury Assessment  Goal: Absence of physical injury  10/11/2023 1210 by Monico Batista RN  Outcome: Adequate for Discharge  10/11/2023 1122 by Monico Batista RN  Outcome: Progressing     Problem: Chronic Conditions and Co-morbidities  Goal: Patient's chronic conditions and co-morbidity symptoms are monitored and maintained or improved  10/11/2023 1210 by Monico Batista RN  Outcome: Adequate for Discharge  10/11/2023 1122 by Monico Batista RN  Outcome: Progressing     Problem: Nutrition Deficit:  Goal: Optimize nutritional status  10/11/2023 1210 by Monico Batista RN  Outcome: Adequate for Discharge  10/11/2023 1122 by Monico Batista RN  Outcome: Progressing

## 2023-10-11 NOTE — CONSULTS
Comprehensive Nutrition Assessment    Type and Reason for Visit:  Initial, Wound, Consult    Nutrition Recommendations/Plan:   Continue current diet as medically appropriate and tolerated. Continue to encourage PO intakes as appropriate. Pt PO intake of 51-75% x 1 meal.   Proteinex ordered once daily and Glucerna ordered BID. Consider MVI. RD to follow pt and available PRN. Malnutrition Assessment:  Malnutrition Status:  No malnutrition (10/11/23 1115)    Context:  Acute Illness     Findings of the 6 clinical characteristics of malnutrition:  Energy Intake:  Mild decrease in energy intake (Comment) (51-75% x 1 meal)  Weight Loss:  No significant weight loss     Body Fat Loss:        Muscle Mass Loss:       Fluid Accumulation:  Mild Extremities   Strength:  Not Performed    Nutrition Assessment:    Pt admitted with dizziness. Pt is at moderate risk for ongoing nutritional compromise r/t wound and fair-good intakes. Nutrition Related Findings:    Obesity. Dentures. + 1 pitting edema BLE. PMH DM, GERD, HLD, Parkinson's disease. Medications: lactobacillus, protonix, insulin, crestor, NaCl. Labs: glu 123-241. Wound Type: Diabetic Ulcer (Wound right third toe)       Current Nutrition Intake & Therapies:    Average Meal Intake: 51-75%  Average Supplements Intake: None Ordered  ADULT DIET; Regular; 4 carb choices (60 gm/meal)  ADULT ORAL NUTRITION SUPPLEMENT; Lunch; Wound Healing Oral Supplement  ADULT ORAL NUTRITION SUPPLEMENT; Breakfast, Dinner; Diabetic Oral Supplement    Anthropometric Measures:  Height: 5' 10\" (177.8 cm)  Ideal Body Weight (IBW): 166 lbs (75 kg)       Current Body Weight: 210 lb (95.3 kg), 126.5 % IBW. Weight Source: Standing Scale  Current BMI (kg/m2): 30.1        Weight Adjustment For: No Adjustment                 BMI Categories: Obese Class 1 (BMI 30.0-34. 9)    Estimated Daily Nutrient Needs:  Energy Requirements Based On: Formula  Weight Used for Energy Requirements:

## 2023-10-11 NOTE — PLAN OF CARE
Problem: Discharge Planning  Goal: Discharge to home or other facility with appropriate resources  10/11/2023 1122 by Judy Rivers RN  Outcome: Progressing  Flowsheets (Taken 10/11/2023 1015)  Discharge to home or other facility with appropriate resources:   Identify barriers to discharge with patient and caregiver   Identify discharge learning needs (meds, wound care, etc)  10/10/2023 2148 by Joanne Beebe RN  Outcome: Progressing     Problem: Safety - Adult  Goal: Free from fall injury  10/11/2023 1122 by Judy Rivers RN  Outcome: Progressing  10/10/2023 2148 by Joanne Beebe RN  Outcome: Progressing     Problem: ABCDS Injury Assessment  Goal: Absence of physical injury  10/11/2023 1122 by Judy Rivers RN  Outcome: Progressing  10/10/2023 2148 by Joanne Beebe RN  Outcome: Progressing     Problem: Chronic Conditions and Co-morbidities  Goal: Patient's chronic conditions and co-morbidity symptoms are monitored and maintained or improved  10/11/2023 1122 by Judy Rivers RN  Outcome: Progressing  10/10/2023 2148 by Joanne Beebe RN  Outcome: Progressing     Problem: Nutrition Deficit:  Goal: Optimize nutritional status  Outcome: Progressing

## 2023-10-11 NOTE — DISCHARGE SUMMARY
Short Stay Summary      Patient ID: Carmen Gregorio       Patient's PCP: Iris Brooke, APRN - CNP    Admit Date: 10/10/2023     Discharge Date:   10/11/2023    Admitting Physician: Kelechi Camarillo MD    Discharge Physician: ZAINA Valenzuela     Reason for this admission:   Dizziness and orthostatic hypotension     Discharge Diagnoses: Active Hospital Problems    Diagnosis Date Noted    Acute renal failure (ARF) ([address]) [N17.9] 10/11/2023    Dizziness [R42] 10/10/2023    Diarrhea [R19.7] 10/10/2023    Parkinson disease [G20. A1]     Orthostatic hypotension [I95.1]     Diabetes mellitus ([address]) [E11.9]        Procedures:  XR CHEST PORTABLE   Final Result      No acute radiographic abnormality of the chest.      CT Head WO Contrast   Final Result      No acute intracranial hemorrhage or mass effect. Consults:   IP CONSULT TO DIETITIAN  PT/OT    HISTORY OF PRESENT ILLNESS:   The patient is a 78 y.o. male with PMH of parkinson disease, DM, HTN, and others who presents due to dizziness and diarrhea. Patient states he has had diarrhea for last 2-3 days. No fever that he knows of. Did have some abdominal cramping. No known sick contacts. He felt ok when he first woke up yesterday morning but then felt weak and dizzy when he was at his primary care office for a checkup so he was sent to the ER. On arrival to ER in acute renal failure and orthostatic with dizziness. Admitted for further management. Today states he feels much better and wants to go home. Feels like diarrhea improving. He was able to walk in the hallway without dizziness. Review of system  Constitutional:  Denies fever or chills. Eyes:  Denies change in visual acuity or discharge. HENT:  Denies nasal congestion or sore throat. Respiratory:  Denies cough or shortness of breath. Cardiovascular:  Denies chest pain, palpitation or swelling in LEs. GI:  Denies abdominal pain, nausea, vomiting, bloody stools. Positive for diarrhea.    : meals)      B-D UF III MINI PEN NEEDLES 31G X 5 MM MISC Inject 1 each into the skin 4 times daily      insulin glargine, 1 unit dial, (TOUJEO) 300 UNIT/ML concentrated injection pen Inject into the skin 45 units at night SQ      rosuvastatin (CRESTOR) 20 MG tablet Take 1 tablet by mouth daily      metFORMIN (GLUCOPHAGE) 1000 MG tablet Take 1 tablet by mouth 2 times daily (with meals)               Patient was seen and examined with Dr. Kellie Walsh. Assessment and plan formulated by Dr. Kellie Walsh. Signed:  Electronically signed by ZAINA Mendoza on 10/11/2023 at 1:52 PM       Thank you Olvin Camacho, APRN - CNP for the opportunity to be involved in this patient's care. If you have any questions or concerns please feel free to contact me at [phone].

## 2023-10-11 NOTE — PROGRESS NOTES
Pt is stable and discharged at this time. IV was removed without complication, tip intact. Pt educated on new medications, next dose time and on pharmacy  or delivery. Pt educated on follow up appointments. Pt verbalized understanding and had no further questions. Pt ambulated to parking lot and left via private vehicle with family.

## 2023-10-13 LAB
C DIFF TOX A+B STL QL IA: NORMAL
GI PATHOGENS PNL STL NAA+PROBE: NORMAL

## 2023-12-04 ENCOUNTER — OFFICE VISIT (OUTPATIENT)
Dept: CARDIOLOGY | Facility: CLINIC | Age: 68
End: 2023-12-04
Payer: MEDICARE

## 2023-12-04 VITALS
RESPIRATION RATE: 18 BRPM | WEIGHT: 212 LBS | BODY MASS INDEX: 29.68 KG/M2 | HEART RATE: 76 BPM | HEIGHT: 71 IN | SYSTOLIC BLOOD PRESSURE: 112 MMHG | OXYGEN SATURATION: 97 % | DIASTOLIC BLOOD PRESSURE: 60 MMHG

## 2023-12-04 DIAGNOSIS — E11.65 UNCONTROLLED TYPE 2 DIABETES MELLITUS WITH HYPERGLYCEMIA: ICD-10-CM

## 2023-12-04 DIAGNOSIS — I10 PRIMARY HYPERTENSION: Primary | ICD-10-CM

## 2023-12-04 DIAGNOSIS — E78.00 PURE HYPERCHOLESTEROLEMIA: ICD-10-CM

## 2023-12-04 PROCEDURE — 99213 OFFICE O/P EST LOW 20 MIN: CPT | Performed by: INTERNAL MEDICINE

## 2023-12-04 NOTE — PROGRESS NOTES
[facility] Cardiology Office Follow Up Note    Vadim Mckeon  [id number]  2023    Primary Care Provider: Zuleyma Blackburn APRN    Chief Complaint: Routine follow-up    History of Present Illness:   Mr. Vadim Mckeon is a 68y.o. male who presents to the Cardiology Clinic for routine follow-up. The patient has a past medical history significant for type 2 diabetes mellitus, hypertension, lipidemia, dementia, and Parkinson's disease.  He does not have any significant past cardiac history.  He presents today for routine follow-up.  Since his last appointment, the patient reports he has been doing well without significant changes in his health.  He reports his systolic BP has been well controlled with his current antihypertensives.  He denies any significant chest pain or exertional chest discomfort.  No new complaints today.      Past Cardiac Testin. Last Coronary Angio: None  2. Prior Stress Testing: None  3. Last Echo:  2021              1.  Normal left ventricular Luis Manuel function, LVEF 65-70%              2.  Grade 1 diastolic dysfunction              3.  Mild concentric LVH  4. Prior Holter Monitor: None    Review of Systems:   Review of Systems   Constitutional:  Negative for activity change, appetite change, chills, diaphoresis, fatigue, fever, unexpected weight gain and unexpected weight loss.   Eyes:  Negative for blurred vision and double vision.   Respiratory:  Negative for cough, chest tightness, shortness of breath and wheezing.    Cardiovascular:  Negative for chest pain, palpitations and leg swelling.   Gastrointestinal:  Negative for abdominal pain, anal bleeding, blood in stool and GERD.   Endocrine: Negative for cold intolerance and heat intolerance.   Genitourinary:  Negative for hematuria.   Neurological:  Negative for dizziness, syncope, weakness and light-headedness.   Hematological:  Does not bruise/bleed easily.   Psychiatric/Behavioral:   Negative for depressed mood and stress. The patient is not nervous/anxious.        I have reviewed and/or updated the patient's past medical, past surgical, family, social history, problem list and allergies as appropriate.     Medications:     Current Outpatient Medications:     Armodafinil 250 MG tablet, Take 1 tablet by mouth Daily., Disp: 30 tablet, Rfl: 3    carbidopa-levodopa (SINEMET)  MG per tablet, 4 (Four) Times a Day., Disp: , Rfl:     citalopram (CeleXA) 40 MG tablet, Take 1 tablet by mouth Daily., Disp: , Rfl:     clonazePAM (KlonoPIN) 1 MG tablet, , Disp: , Rfl:     colestipol (COLESTID) 1 g tablet, Take 1 tablet by mouth 2 (Two) Times a Day., Disp: , Rfl:     Continuous Blood Gluc Sensor (FreeStyle Neda 2 Sensor) misc, USE ONE device every 14 DAYS, Disp: 6 each, Rfl: 3    cyanocobalamin 1000 MCG/ML injection, Inject 1 ml IM every 2 weeks for 2 doses, THEN ONCE monthly], Disp: , Rfl:     donepezil (ARICEPT) 23 MG tablet, Take 1 tablet by mouth every night at bedtime., Disp: , Rfl:     hydrOXYzine (ATARAX) 25 MG tablet, , Disp: , Rfl:     Insulin Lispro, 1 Unit Dial, (HumaLOG KwikPen) 100 UNIT/ML solution pen-injector, Inject 8 Units under the skin into the appropriate area as directed 3 (Three) Times a Day Before Meals. (Patient taking differently: Inject 8 Units under the skin into the appropriate area as directed 3 (Three) Times a Day Before Meals. 8-12 UNITS TID BEFORE MEALS), Disp: 24 mL, Rfl: 3    Insulin Pen Needle (Easy Touch Pen Needles) 31G X 8 MM misc, Use to give insulin qid. Diagnoses code e11.65, Disp: 400 each, Rfl: 3    lisinopril-hydrochlorothiazide (PRINZIDE,ZESTORETIC) 20-25 MG per tablet, TAKE ONE TABLET BY MOUTH everyday, Disp: , Rfl:     loperamide (IMODIUM) 2 MG capsule, , Disp: , Rfl:     metFORMIN (GLUCOPHAGE) 1000 MG tablet, Take 1 tablet by mouth 2 (Two) Times a Day., Disp: , Rfl:     Nuedexta 20-10 MG capsule capsule, , Disp: , Rfl:     Omega-3 Fatty Acids (FISH OIL  "TRIPLE STRENGTH) 1400 MG capsule, Take 1 capsule by mouth daily., Disp: , Rfl:     omeprazole (priLOSEC) 40 MG capsule, , Disp: , Rfl:     Pramipexole Dihydrochloride ER 1.5 MG tablet sustained-release 24 hour, Take 1.5 mg by mouth Daily., Disp: , Rfl:     ranitidine (ZANTAC) 150 MG tablet, Take 1 tablet by mouth 2 (Two) Times a Day., Disp: , Rfl:     rosuvastatin (CRESTOR) 20 MG tablet, TAKE 1 TABLET BY MOUTH ONCE AT BEDTIME, Disp: 30 tablet, Rfl: 5    tadalafil (CIALIS) 5 MG tablet, Take 1 tablet by mouth Daily., Disp: , Rfl:     triamcinolone (KENALOG) 0.5 % cream, apply TO TO RASH AREA TWO TO THREE times daily, Disp: , Rfl:     True Metrix Blood Glucose Test test strip, TEST BLOOD SUGAR TWICE A DAY, Disp: 100 each, Rfl: 11    vitamin D (ERGOCALCIFEROL) 50561 units capsule capsule, TAKE 1 CAPSULE BY MOUTH ONCE WEEKLY, Disp: 4 capsule, Rfl: 12    Insulin Glargine, 1 Unit Dial, (TOUJEO) 300 UNIT/ML solution pen-injector injection, Inject 50 Units under the skin into the appropriate area as directed Daily., Disp: 5 mL, Rfl: 5    Physical Exam:  Vital Signs:   Vitals:    12/04/23 0959   BP: 112/60   BP Location: Right arm   Patient Position: Sitting   Pulse: 76   Resp: 18   SpO2: 97%   Weight: 96.2 kg (212 lb)   Height: 180.3 cm (71\")       Physical Exam  Constitutional:       General: He is not in acute distress.     Appearance: Normal appearance. He is not diaphoretic.   HENT:      Head: Normocephalic and atraumatic.   Cardiovascular:      Rate and Rhythm: Normal rate and regular rhythm.      Heart sounds: No murmur heard.  Pulmonary:      Effort: Pulmonary effort is normal. No respiratory distress.      Breath sounds: Normal breath sounds. No stridor. No wheezing, rhonchi or rales.   Abdominal:      General: Bowel sounds are normal. There is no distension.      Palpations: Abdomen is soft.      Tenderness: There is no abdominal tenderness. There is no guarding or rebound.   Musculoskeletal:         General: No " swelling. Normal range of motion.      Cervical back: Neck supple. No tenderness.   Skin:     General: Skin is warm and dry.   Neurological:      General: No focal deficit present.      Mental Status: He is alert and oriented to person, place, and time.   Psychiatric:         Mood and Affect: Mood normal.         Behavior: Behavior normal.         Results Review:   I reviewed the patient's new clinical results.        Assessment / Plan:     1. Essential hypertension  -- BP remains adequately controlled today and on home BP checks  --Continue current antihypertensives     2.  Hyperlipidemia  -- Last lipid profile in 9/23 showed LDL -6, HDL 33, triglycerides 296  --Continue statin  --If worsening triglycerides, will consider the addition of fibrate     3. Type 2 diabetes mellitus  -- Last hemoglobin A1c 5.9% in 9/23  --Management per PCP     4.  Parkinson's disease  --Follows with neurology       Follow Up:   Return in about 1 year (around 12/4/2024).      Thank you for allowing me to participate in the care of your patient. Please to not hesitate to contact me with additional questions or concerns.     GUTIERREZ Levy MD  Interventional Cardiology   12/04/2023  09:50 EST

## 2024-01-03 ENCOUNTER — OFFICE VISIT (OUTPATIENT)
Dept: ENDOCRINOLOGY | Facility: CLINIC | Age: 69
End: 2024-01-03
Payer: MEDICARE

## 2024-01-03 VITALS
OXYGEN SATURATION: 97 % | BODY MASS INDEX: 30.1 KG/M2 | SYSTOLIC BLOOD PRESSURE: 120 MMHG | HEART RATE: 71 BPM | HEIGHT: 71 IN | DIASTOLIC BLOOD PRESSURE: 62 MMHG | WEIGHT: 215 LBS

## 2024-01-03 DIAGNOSIS — E11.65 UNCONTROLLED TYPE 2 DIABETES MELLITUS WITH HYPERGLYCEMIA: Primary | ICD-10-CM

## 2024-01-03 LAB
EXPIRATION DATE: ABNORMAL
EXPIRATION DATE: NORMAL
GLUCOSE BLDC GLUCOMTR-MCNC: 122 MG/DL (ref 70–130)
HBA1C MFR BLD: 6.9 % (ref 4.5–5.7)
Lab: ABNORMAL
Lab: NORMAL

## 2024-01-03 PROCEDURE — 3044F HG A1C LEVEL LT 7.0%: CPT | Performed by: INTERNAL MEDICINE

## 2024-01-03 PROCEDURE — 1159F MED LIST DOCD IN RCRD: CPT | Performed by: INTERNAL MEDICINE

## 2024-01-03 PROCEDURE — 82947 ASSAY GLUCOSE BLOOD QUANT: CPT | Performed by: INTERNAL MEDICINE

## 2024-01-03 PROCEDURE — 99213 OFFICE O/P EST LOW 20 MIN: CPT | Performed by: INTERNAL MEDICINE

## 2024-01-03 PROCEDURE — 1160F RVW MEDS BY RX/DR IN RCRD: CPT | Performed by: INTERNAL MEDICINE

## 2024-01-03 PROCEDURE — 83036 HEMOGLOBIN GLYCOSYLATED A1C: CPT | Performed by: INTERNAL MEDICINE

## 2024-01-03 NOTE — PROGRESS NOTES
"     Office Note      Date: 2024  Patient Name: Vadim Mckeon  MRN: [id number]  : 1955    Chief Complaint   Patient presents with    Diabetes     Uncontrolled type 2 diabetes mellitus with hyperglycemia       History of Present Illness:   Vadim Mckeon is a 68 y.o. male who presents for Diabetes type 2.   Current RX mdi and metformin     Bg checks are done:>10  times per day with deni   Hypoglycemia :occasional mild at night but nothing serious       Last A1c:  Hemoglobin A1C   Date Value Ref Range Status   2024 6.9 (A) 4.5 - 5.7 % Final   2023 5.9 See comment % Final     Comment:     Comment:  Diagnosis of Diabetes: > or = 6.5%  Increased risk of diabetes (Prediabetes): 5.7-6.4%  Glycemic Control: Nonpregnant Adults: <7.0%                    Pregnant: <6.0%       Changes in health since last visit: none. Last eye exam up to date.    Subjective              Review of Systems:   Review of Systems   Respiratory:  Negative for chest tightness and shortness of breath.        The following portions of the patient's history were reviewed and updated as appropriate: allergies, current medications, past family history, past medical history, past social history, past surgical history, and problem list.    Objective     Visit Vitals  /62 (BP Location: Left arm, Patient Position: Sitting, Cuff Size: Adult)   Pulse 71   Ht 180.3 cm (71\")   Wt 97.5 kg (215 lb)   SpO2 97%   BMI 29.99 kg/m²           Physical Exam:  Physical Exam  Vitals reviewed.   Constitutional:       Appearance: Normal appearance.   Neurological:      Mental Status: He is alert.   Psychiatric:         Mood and Affect: Mood normal.         Behavior: Behavior normal.         Thought Content: Thought content normal.         Judgment: Judgment normal.          Assessment / Plan      Assessment & Plan:  Problem List Items Addressed This Visit          Other    Uncontrolled type 2 diabetes mellitus with hyperglycemia - " Primary    Current Assessment & Plan     A1c of 6.9 today is higher than last time but still good. No changes are needed            Relevant Medications    metFORMIN (GLUCOPHAGE) 1000 MG tablet    Insulin Lispro, 1 Unit Dial, (HumaLOG KwikPen) 100 UNIT/ML solution pen-injector    Other Relevant Orders    POC Glycosylated Hemoglobin (Hb A1C) (Completed)    POC Glucose, Blood (Completed)         Electronically signed by :Matthew Bills MD   01/03/2024

## 2024-01-08 ENCOUNTER — HOSPITAL ENCOUNTER (OUTPATIENT)
Facility: HOSPITAL | Age: 69
Discharge: HOME | End: 2024-01-08
Payer: MEDICARE

## 2024-01-08 LAB
25(OH)D3 SERPL-MCNC: 63.3 NG/ML (ref 32–100)
ALBUMIN SERPL-MCNC: 4.2 G/DL (ref 3.4–4.8)
ALBUMIN/GLOB SERPL: 1.8 {RATIO} (ref 0.8–2)
ALP SERPL-CCNC: 74 U/L (ref 25–100)
ALT SERPL-CCNC: <5 U/L (ref 4–36)
ANION GAP SERPL CALCULATED.3IONS-SCNC: 11 MMOL/L (ref 3–16)
AST SERPL-CCNC: 12 U/L (ref 8–33)
BASOPHILS # BLD: 0.1 K/UL (ref 0–0.1)
BASOPHILS NFR BLD: 0.4 %
BILIRUB SERPL-MCNC: 0.6 MG/DL (ref 0.3–1.2)
BUN SERPL-MCNC: 18 MG/DL (ref 6–20)
CALCIUM SERPL-MCNC: 8.9 MG/DL (ref 8.5–10.5)
CHLORIDE SERPL-SCNC: 100 MMOL/L (ref 98–107)
CHOLEST SERPL-MCNC: 72 MG/DL (ref 0–200)
CO2 SERPL-SCNC: 27 MMOL/L (ref 20–30)
CREAT SERPL-MCNC: 1.4 MG/DL (ref 0.4–1.2)
EOSINOPHIL # BLD: 0 K/UL (ref 0–0.4)
EOSINOPHIL NFR BLD: 0.3 %
ERYTHROCYTE [DISTWIDTH] IN BLOOD BY AUTOMATED COUNT: 13.2 % (ref 11–16)
FOLATE SERPL-MCNC: 9.18 NG/ML
GFR SERPLBLD CREATININE-BSD FMLA CKD-EPI: 55 ML/MIN/{1.73_M2}
GLOBULIN SER CALC-MCNC: 2.4 G/DL
GLUCOSE SERPL-MCNC: 135 MG/DL (ref 74–106)
HBA1C MFR BLD: 6.7 %
HCT VFR BLD AUTO: 36.4 % (ref 40–54)
HDLC SERPL-MCNC: 30 MG/DL (ref 40–60)
HGB BLD-MCNC: 12.3 G/DL (ref 13–18)
IMM GRANULOCYTES # BLD: 0 K/UL
IMM GRANULOCYTES NFR BLD: 0.3 % (ref 0–5)
LDLC SERPL CALC-MCNC: 10 MG/DL
LYMPHOCYTES # BLD: 1.1 K/UL (ref 1.5–4)
LYMPHOCYTES NFR BLD: 9.7 %
MCH RBC QN AUTO: 30.1 PG (ref 27–32)
MCHC RBC AUTO-ENTMCNC: 33.8 G/DL (ref 31–35)
MCV RBC AUTO: 89 FL (ref 80–100)
MONOCYTES # BLD: 0.8 K/UL (ref 0.2–0.8)
MONOCYTES NFR BLD: 7.1 %
NEUTROPHILS # BLD: 9.7 K/UL (ref 2–7.5)
NEUTS SEG NFR BLD: 82.2 %
PLATELET # BLD AUTO: 149 K/UL (ref 150–400)
PMV BLD AUTO: 10.5 FL (ref 6–10)
POTASSIUM SERPL-SCNC: 4.4 MMOL/L (ref 3.4–5.1)
PROT SERPL-MCNC: 6.6 G/DL (ref 6.4–8.3)
RBC # BLD AUTO: 4.09 M/UL (ref 4.5–6)
SODIUM SERPL-SCNC: 138 MMOL/L (ref 136–145)
TRIGL SERPL-MCNC: 159 MG/DL (ref 0–249)
TSH SERPL DL<=0.005 MIU/L-ACNC: 1.83 UIU/ML (ref 0.27–4.2)
VIT B12 SERPL-MCNC: 302 PG/ML (ref 211–911)
VLDLC SERPL CALC-MCNC: 32 MG/DL
WBC # BLD AUTO: 11.8 K/UL (ref 4–11)

## 2024-01-08 PROCEDURE — 82746 ASSAY OF FOLIC ACID SERUM: CPT

## 2024-01-08 PROCEDURE — 80053 COMPREHEN METABOLIC PANEL: CPT

## 2024-01-08 PROCEDURE — 36415 COLL VENOUS BLD VENIPUNCTURE: CPT

## 2024-01-08 PROCEDURE — 84443 ASSAY THYROID STIM HORMONE: CPT

## 2024-01-08 PROCEDURE — 85025 COMPLETE CBC W/AUTO DIFF WBC: CPT

## 2024-01-08 PROCEDURE — 83036 HEMOGLOBIN GLYCOSYLATED A1C: CPT

## 2024-01-08 PROCEDURE — 80061 LIPID PANEL: CPT

## 2024-01-08 PROCEDURE — 82306 VITAMIN D 25 HYDROXY: CPT

## 2024-01-08 PROCEDURE — 82607 VITAMIN B-12: CPT

## 2024-01-18 ENCOUNTER — HOSPITAL ENCOUNTER (EMERGENCY)
Facility: HOSPITAL | Age: 69
Discharge: HOME | End: 2024-01-18
Attending: EMERGENCY MEDICINE
Payer: MEDICARE

## 2024-01-18 VITALS
HEIGHT: 70 IN | OXYGEN SATURATION: 97 % | WEIGHT: 215 LBS | RESPIRATION RATE: 20 BRPM | DIASTOLIC BLOOD PRESSURE: 72 MMHG | BODY MASS INDEX: 30.78 KG/M2 | SYSTOLIC BLOOD PRESSURE: 125 MMHG | HEART RATE: 75 BPM | TEMPERATURE: 98.1 F

## 2024-01-18 DIAGNOSIS — R11.2 NAUSEA, VOMITING AND DIARRHEA: Primary | ICD-10-CM

## 2024-01-18 DIAGNOSIS — R19.7 NAUSEA, VOMITING AND DIARRHEA: Primary | ICD-10-CM

## 2024-01-18 LAB
ALBUMIN SERPL-MCNC: 4.3 G/DL (ref 3.4–4.8)
ALBUMIN/GLOB SERPL: 1.5 {RATIO} (ref 0.8–2)
ALP SERPL-CCNC: 83 U/L (ref 25–100)
ALT SERPL-CCNC: <5 U/L (ref 4–36)
ANION GAP SERPL CALCULATED.3IONS-SCNC: 11 MMOL/L (ref 3–16)
AST SERPL-CCNC: 14 U/L (ref 8–33)
BACTERIA URNS QL MICRO: ABNORMAL /HPF
BASOPHILS # BLD: 0 K/UL (ref 0–0.1)
BASOPHILS NFR BLD: 0.5 %
BILIRUB SERPL-MCNC: 0.8 MG/DL (ref 0.3–1.2)
BILIRUB UR QL STRIP.AUTO: NEGATIVE
BUN SERPL-MCNC: 31 MG/DL (ref 6–20)
CALCIUM SERPL-MCNC: 9.2 MG/DL (ref 8.5–10.5)
CHLORIDE SERPL-SCNC: 101 MMOL/L (ref 98–107)
CLARITY UR: CLEAR
CO2 SERPL-SCNC: 26 MMOL/L (ref 20–30)
COLOR UR: YELLOW
CREAT SERPL-MCNC: 1.5 MG/DL (ref 0.4–1.2)
EOSINOPHIL # BLD: 0 K/UL (ref 0–0.4)
EOSINOPHIL NFR BLD: 0.1 %
ERYTHROCYTE [DISTWIDTH] IN BLOOD BY AUTOMATED COUNT: 13.1 % (ref 11–16)
FLUAV AG NPH QL: NEGATIVE
FLUBV AG NPH QL: NEGATIVE
GFR SERPLBLD CREATININE-BSD FMLA CKD-EPI: 51 ML/MIN/{1.73_M2}
GLOBULIN SER CALC-MCNC: 2.8 G/DL
GLUCOSE SERPL-MCNC: 138 MG/DL (ref 74–106)
GLUCOSE UR STRIP.AUTO-MCNC: NEGATIVE MG/DL
HCT VFR BLD AUTO: 36.2 % (ref 40–54)
HGB BLD-MCNC: 12.6 G/DL (ref 13–18)
HGB UR QL STRIP.AUTO: NEGATIVE
HYALINE CASTS #/AREA URNS LPF: ABNORMAL /LPF (ref 0–2)
IMM GRANULOCYTES # BLD: 0 K/UL
IMM GRANULOCYTES NFR BLD: 0.2 % (ref 0–5)
KETONES UR STRIP.AUTO-MCNC: NEGATIVE MG/DL
LEUKOCYTE ESTERASE UR QL STRIP.AUTO: NEGATIVE
LIPASE SERPL-CCNC: 31 U/L (ref 5.6–51.3)
LYMPHOCYTES # BLD: 1.2 K/UL (ref 1.5–4)
LYMPHOCYTES NFR BLD: 14 %
MAGNESIUM SERPL-MCNC: 1.8 MG/DL (ref 1.7–2.4)
MCH RBC QN AUTO: 30.4 PG (ref 27–32)
MCHC RBC AUTO-ENTMCNC: 34.8 G/DL (ref 31–35)
MCV RBC AUTO: 87.4 FL (ref 80–100)
MONOCYTES # BLD: 0.5 K/UL (ref 0.2–0.8)
MONOCYTES NFR BLD: 6.2 %
MUCOUS THREADS URNS QL MICRO: ABNORMAL /LPF
NEUTROPHILS # BLD: 6.5 K/UL (ref 2–7.5)
NEUTS SEG NFR BLD: 79 %
NITRITE UR QL STRIP.AUTO: NEGATIVE
PH UR STRIP.AUTO: 5.5 [PH] (ref 5–8)
PLATELET # BLD AUTO: 157 K/UL (ref 150–400)
PMV BLD AUTO: 10 FL (ref 6–10)
POTASSIUM SERPL-SCNC: 4.6 MMOL/L (ref 3.4–5.1)
PROT SERPL-MCNC: 7.1 G/DL (ref 6.4–8.3)
PROT UR STRIP.AUTO-MCNC: 30 MG/DL
RBC # BLD AUTO: 4.14 M/UL (ref 4.5–6)
RBC #/AREA URNS HPF: ABNORMAL /HPF (ref 0–4)
SARS-COV-2 RDRP RESP QL NAA+PROBE: NOT DETECTED
SODIUM SERPL-SCNC: 138 MMOL/L (ref 136–145)
SP GR UR STRIP.AUTO: >=1.03 (ref 1–1.03)
UA COMPLETE W REFLEX CULTURE PNL UR: ABNORMAL
UA DIPSTICK W REFLEX MICRO PNL UR: YES
URN SPEC COLLECT METH UR: ABNORMAL
UROBILINOGEN UR STRIP-ACNC: 1 E.U./DL
WBC # BLD AUTO: 8.2 K/UL (ref 4–11)
WBC #/AREA URNS HPF: ABNORMAL /HPF (ref 0–5)

## 2024-01-18 PROCEDURE — 87635 SARS-COV-2 COVID-19 AMP PRB: CPT

## 2024-01-18 PROCEDURE — 81001 URINALYSIS AUTO W/SCOPE: CPT

## 2024-01-18 PROCEDURE — 96374 THER/PROPH/DIAG INJ IV PUSH: CPT

## 2024-01-18 PROCEDURE — 83690 ASSAY OF LIPASE: CPT

## 2024-01-18 PROCEDURE — 83735 ASSAY OF MAGNESIUM: CPT

## 2024-01-18 PROCEDURE — 80053 COMPREHEN METABOLIC PANEL: CPT

## 2024-01-18 PROCEDURE — 36415 COLL VENOUS BLD VENIPUNCTURE: CPT

## 2024-01-18 PROCEDURE — 85025 COMPLETE CBC W/AUTO DIFF WBC: CPT

## 2024-01-18 PROCEDURE — 6360000002 HC RX W HCPCS: Performed by: EMERGENCY MEDICINE

## 2024-01-18 PROCEDURE — 87804 INFLUENZA ASSAY W/OPTIC: CPT

## 2024-01-18 PROCEDURE — 99284 EMERGENCY DEPT VISIT MOD MDM: CPT

## 2024-01-18 PROCEDURE — 2580000003 HC RX 258: Performed by: EMERGENCY MEDICINE

## 2024-01-18 RX ORDER — ONDANSETRON 2 MG/ML
4 INJECTION INTRAMUSCULAR; INTRAVENOUS ONCE
Status: COMPLETED | OUTPATIENT
Start: 2024-01-18 | End: 2024-01-18

## 2024-01-18 RX ORDER — ONDANSETRON 4 MG/1
4 TABLET, ORALLY DISINTEGRATING ORAL EVERY 8 HOURS PRN
Qty: 20 TABLET | Refills: 0 | Status: SHIPPED | OUTPATIENT
Start: 2024-01-18 | End: 2024-01-25

## 2024-01-18 RX ORDER — 0.9 % SODIUM CHLORIDE 0.9 %
1000 INTRAVENOUS SOLUTION INTRAVENOUS ONCE
Status: COMPLETED | OUTPATIENT
Start: 2024-01-18 | End: 2024-01-18

## 2024-01-18 RX ADMIN — ONDANSETRON 4 MG: 2 INJECTION INTRAMUSCULAR; INTRAVENOUS at 11:20

## 2024-01-18 RX ADMIN — SODIUM CHLORIDE 1000 ML: 9 INJECTION, SOLUTION INTRAVENOUS at 11:21

## 2024-01-18 ASSESSMENT — PAIN - FUNCTIONAL ASSESSMENT: PAIN_FUNCTIONAL_ASSESSMENT: NONE - DENIES PAIN

## 2024-01-18 NOTE — ED PROVIDER NOTES
.        [facility]PART[facility]  EMERGENCY DEPARTMENT ENCOUNTER        Pt Name: Gareth yL  MRN: [id number]  Birthdate 1955  Date of evaluation: 1/18/2024  Provider: Nicole Amezquita MD  PCP: Kathya Castelan APRN - CNP  Note Started: 11:07 AM EST 1/18/24    CHIEF COMPLAINT       Chief Complaint   Patient presents with    Fatigue    Emesis    Diarrhea       HISTORY OF PRESENT ILLNESS: 1 or more Elements     History from : Patient    Limitations to history : None    Gareth Ly is a 68 y.o. male who presents complaining of generalized weakness for the last week he has had associated nausea and vomiting he is thrown up once today with dry heaving as well has had 2 episodes of diarrhea he denies any fever or abdominal pain he was seen by his primary care about 10 days ago but those prior to the onset of this illness he states that the comes and goes was exposed to COVID-19 but that is been about 3 weeks ago he denies any blood in his vomit or stool.  He denies dysuria.    Nursing Notes were all reviewed and agreed with or any disagreements were addressed in the HPI.    REVIEW OF SYSTEMS :      Review of Systems     systems reviewed and negative except as in HPI/MDM    SURGICAL HISTORY     Past Surgical History:   Procedure Laterality Date    BRAIN SURGERY  03/2018    DBS system     DEEP BRAIN STIMULATOR PLACEMENT      KNEE ARTHROPLASTY Left        CURRENTMEDICATIONS       Previous Medications    B-D UF III MINI PEN NEEDLES 31G X 5 MM MISC    Inject 1 each into the skin 4 times daily    CARBIDOPA-LEVODOPA (SINEMET)  MG PER TABLET    Take 1 tablet by mouth 5 times daily    CITALOPRAM (CELEXA) 40 MG TABLET    Take 1.5 tablets by mouth daily    CLONAZEPAM (KLONOPIN) 1 MG TABLET    Take 1.5 tablets by mouth nightly as needed for Anxiety.    COLESTIPOL (COLESTID) 1 G TABLET    Take 1 tablet by mouth 3 times daily (with meals)    CONTINUOUS BLOOD GLUC SENSOR (FREESTYLE LYNDON 2 SENSOR) [facility]

## 2024-01-18 NOTE — ED NOTES
Discharge instructions and medications reviewed with patient and brother, verbalize understanding. IV removed without complication. Patient alert and oriented x 4, no further questions or concerns. Patient discharged home via POV. Brother present to transport.

## 2024-01-18 NOTE — ED TRIAGE NOTES
Patient presents today with nausea, vomiting, diarrhea, weakness, and fatigue for almost 1 week. He reports that this morning he woke up feeling worse and came to the ED. He denies runny nose, cough, or congestion. He reports that he was exposed to covid at the  [employer] a few weeks ago, but has not been anyone sick recently.

## 2024-01-23 ENCOUNTER — TELEPHONE (OUTPATIENT)
Dept: ENDOCRINOLOGY | Facility: CLINIC | Age: 69
End: 2024-01-23
Payer: MEDICARE

## 2024-01-23 NOTE — TELEPHONE ENCOUNTER
"Pt returned call, states his BG has been running \"low\" for a couple of weeks. Fasting BG 58-80, BG throughout the day has stayed <150. Pt has not been taking Humalog with meals, he is taking Toujeo 50U in the evening unless BG is <100.  "

## 2024-01-29 RX ORDER — ROSUVASTATIN CALCIUM 20 MG/1
20 TABLET, COATED ORAL
Qty: 30 TABLET | Refills: 0 | Status: SHIPPED | OUTPATIENT
Start: 2024-01-29

## 2024-01-29 NOTE — TELEPHONE ENCOUNTER
Rx Refill Note  Requested Prescriptions     Pending Prescriptions Disp Refills    rosuvastatin (CRESTOR) 20 MG tablet [Pharmacy Med Name: ROSUVASTATIN CALCIUM 20 MG 20 Tablet] 30 tablet 0     Sig: TAKE ONE TABLET BY MOUTH ONCE DAILY AT BEDTIME      Last office visit with prescribing clinician:1/3/24    Next office visit with prescribing clinician: 7/3/24        Shyann Jimenez MA  01/29/24, 13:09 EST

## 2024-02-23 RX ORDER — ROSUVASTATIN CALCIUM 20 MG/1
20 TABLET, COATED ORAL
Qty: 30 TABLET | Refills: 0 | Status: SHIPPED | OUTPATIENT
Start: 2024-02-23

## 2024-02-23 NOTE — TELEPHONE ENCOUNTER
Rx Refill Note  Requested Prescriptions     Pending Prescriptions Disp Refills    rosuvastatin (CRESTOR) 20 MG tablet [Pharmacy Med Name: ROSUVASTATIN CALCIUM 20 MG 20 Tablet] 30 tablet 0     Sig: TAKE ONE TABLET BY MOUTH ONCE DAILY AT BEDTIME          Last office visit with prescribing clinician: 1/3/2024     Next office visit with prescribing clinician: 7/3/2024         Yvonne Salcido MA  02/23/24, 10:41 EST

## 2024-04-01 RX ORDER — ROSUVASTATIN CALCIUM 20 MG/1
20 TABLET, COATED ORAL
Qty: 30 TABLET | Refills: 0 | Status: SHIPPED | OUTPATIENT
Start: 2024-04-01

## 2024-04-01 NOTE — TELEPHONE ENCOUNTER
Rx Refill Note  Requested Prescriptions     Pending Prescriptions Disp Refills    rosuvastatin (CRESTOR) 20 MG tablet [Pharmacy Med Name: ROSUVASTATIN CALCIUM 20 MG 20 Tablet] 30 tablet 0     Sig: TAKE ONE TABLET BY MOUTH ONCE DAILY AT BEDTIME          Last office visit with prescribing clinician: 1/3/2024     Next office visit with prescribing clinician: 7/3/2024         Yvonne Salcido MA  04/01/24, 11:17 EDT

## 2024-05-01 ENCOUNTER — HOSPITAL ENCOUNTER (OUTPATIENT)
Facility: HOSPITAL | Age: 69
Discharge: HOME | End: 2024-05-01
Payer: MEDICARE

## 2024-05-01 LAB
ALBUMIN SERPL-MCNC: 4.6 G/DL (ref 3.4–4.8)
ALBUMIN/GLOB SERPL: 2.2 {RATIO} (ref 0.8–2)
ALP SERPL-CCNC: 73 U/L (ref 25–100)
ALT SERPL-CCNC: 16 U/L (ref 4–36)
ANION GAP SERPL CALCULATED.3IONS-SCNC: 9 MMOL/L (ref 3–16)
AST SERPL-CCNC: 15 U/L (ref 8–33)
BASOPHILS # BLD: 0 K/UL (ref 0–0.1)
BASOPHILS NFR BLD: 0.5 %
BILIRUB SERPL-MCNC: 0.5 MG/DL (ref 0.3–1.2)
BUN SERPL-MCNC: 25 MG/DL (ref 6–20)
CALCIUM SERPL-MCNC: 9 MG/DL (ref 8.5–10.5)
CHLORIDE SERPL-SCNC: 102 MMOL/L (ref 98–107)
CHOLEST SERPL-MCNC: 80 MG/DL (ref 0–200)
CO2 SERPL-SCNC: 26 MMOL/L (ref 20–30)
CREAT SERPL-MCNC: 1.3 MG/DL (ref 0.4–1.2)
EOSINOPHIL # BLD: 0 K/UL (ref 0–0.4)
EOSINOPHIL NFR BLD: 0.5 %
ERYTHROCYTE [DISTWIDTH] IN BLOOD BY AUTOMATED COUNT: 12.8 % (ref 11–16)
FOLATE SERPL-MCNC: 8.62 NG/ML
GFR SERPLBLD CREATININE-BSD FMLA CKD-EPI: 60 ML/MIN/{1.73_M2}
GLOBULIN SER CALC-MCNC: 2.1 G/DL
GLUCOSE SERPL-MCNC: 165 MG/DL (ref 74–106)
HBA1C MFR BLD: 7.6 %
HCT VFR BLD AUTO: 36.3 % (ref 40–54)
HDLC SERPL-MCNC: 29 MG/DL (ref 40–60)
HGB BLD-MCNC: 12.2 G/DL (ref 13–18)
IMM GRANULOCYTES # BLD: 0 K/UL
IMM GRANULOCYTES NFR BLD: 0.3 % (ref 0–5)
LDLC SERPL CALC-MCNC: 14 MG/DL
LYMPHOCYTES # BLD: 1.4 K/UL (ref 1.5–4)
LYMPHOCYTES NFR BLD: 19.1 %
MCH RBC QN AUTO: 30.5 PG (ref 27–32)
MCHC RBC AUTO-ENTMCNC: 33.6 G/DL (ref 31–35)
MCV RBC AUTO: 90.8 FL (ref 80–100)
MONOCYTES # BLD: 0.5 K/UL (ref 0.2–0.8)
MONOCYTES NFR BLD: 7 %
NEUTROPHILS # BLD: 5.4 K/UL (ref 2–7.5)
NEUTS SEG NFR BLD: 72.6 %
PLATELET # BLD AUTO: 154 K/UL (ref 150–400)
PMV BLD AUTO: 11.2 FL (ref 6–10)
POTASSIUM SERPL-SCNC: 4.5 MMOL/L (ref 3.4–5.1)
PROT SERPL-MCNC: 6.7 G/DL (ref 6.4–8.3)
RBC # BLD AUTO: 4 M/UL (ref 4.5–6)
SODIUM SERPL-SCNC: 137 MMOL/L (ref 136–145)
TRIGL SERPL-MCNC: 185 MG/DL (ref 0–249)
TSH SERPL DL<=0.005 MIU/L-ACNC: 3.4 UIU/ML (ref 0.27–4.2)
VIT B12 SERPL-MCNC: 266 PG/ML (ref 211–911)
VLDLC SERPL CALC-MCNC: 37 MG/DL
WBC # BLD AUTO: 7.4 K/UL (ref 4–11)

## 2024-05-01 PROCEDURE — 83036 HEMOGLOBIN GLYCOSYLATED A1C: CPT

## 2024-05-01 PROCEDURE — 85025 COMPLETE CBC W/AUTO DIFF WBC: CPT

## 2024-05-01 PROCEDURE — 84443 ASSAY THYROID STIM HORMONE: CPT

## 2024-05-01 PROCEDURE — 82746 ASSAY OF FOLIC ACID SERUM: CPT

## 2024-05-01 PROCEDURE — 80061 LIPID PANEL: CPT

## 2024-05-01 PROCEDURE — 82607 VITAMIN B-12: CPT

## 2024-05-01 PROCEDURE — 36415 COLL VENOUS BLD VENIPUNCTURE: CPT

## 2024-05-01 PROCEDURE — 80053 COMPREHEN METABOLIC PANEL: CPT

## 2024-05-14 RX ORDER — ROSUVASTATIN CALCIUM 20 MG/1
20 TABLET, COATED ORAL
Qty: 30 TABLET | Refills: 2 | Status: SHIPPED | OUTPATIENT
Start: 2024-05-14

## 2024-05-14 NOTE — TELEPHONE ENCOUNTER
Rx Refill Note  Requested Prescriptions     Pending Prescriptions Disp Refills    rosuvastatin (CRESTOR) 20 MG tablet [Pharmacy Med Name: ROSUVASTATIN CALCIUM 20 MG 20 Tablet] 30 tablet 0     Sig: TAKE ONE TABLET BY MOUTH ONCE DAILY AT BEDTIME        Last office visit with prescribing clinician: 1/3/2024      Next office visit with prescribing clinician: 7/3/2024       Macarena Frankel (Jodi)  05/14/24, 10:56 EDT

## 2024-05-30 ENCOUNTER — TELEPHONE (OUTPATIENT)
Dept: UROLOGY | Facility: CLINIC | Age: 69
End: 2024-05-30
Payer: MEDICARE

## 2024-05-30 NOTE — TELEPHONE ENCOUNTER
Have patient come in and see one of the nurse practitioners.  It has been over a year since he saw Navya

## 2024-05-30 NOTE — TELEPHONE ENCOUNTER
Provider: DR MCCABE    Caller: JEZ TRIVEDI    Relationship to Patient: SELF    Pharmacy: [facility], [place]    Phone Number: [phone]    Reason for Call: PT NEEDS REFILL ON MEDICATION ALPROSTADIL.    PLEASE CALL TO CONFIRM MEDICATION HAS BEEN SENT TO PHARMACY.

## 2024-06-05 ENCOUNTER — OFFICE VISIT (OUTPATIENT)
Dept: UROLOGY | Facility: CLINIC | Age: 69
End: 2024-06-05
Payer: MEDICARE

## 2024-06-05 VITALS
HEART RATE: 53 BPM | SYSTOLIC BLOOD PRESSURE: 138 MMHG | BODY MASS INDEX: 31.16 KG/M2 | HEIGHT: 69 IN | DIASTOLIC BLOOD PRESSURE: 84 MMHG | WEIGHT: 210.4 LBS | TEMPERATURE: 97.1 F

## 2024-06-05 DIAGNOSIS — N52.9 ERECTILE DYSFUNCTION, UNSPECIFIED ERECTILE DYSFUNCTION TYPE: Primary | ICD-10-CM

## 2024-06-05 PROCEDURE — 1160F RVW MEDS BY RX/DR IN RCRD: CPT | Performed by: NURSE PRACTITIONER

## 2024-06-05 PROCEDURE — 1159F MED LIST DOCD IN RCRD: CPT | Performed by: NURSE PRACTITIONER

## 2024-06-05 PROCEDURE — 99214 OFFICE O/P EST MOD 30 MIN: CPT | Performed by: NURSE PRACTITIONER

## 2024-06-05 RX ORDER — GABAPENTIN 300 MG/1
1 CAPSULE ORAL NIGHTLY
COMMUNITY
Start: 2024-03-14

## 2024-06-05 RX ORDER — ONDANSETRON 4 MG/1
TABLET, ORALLY DISINTEGRATING ORAL
COMMUNITY
Start: 2024-01-18

## 2024-06-05 NOTE — PROGRESS NOTES
Office Visit Established Male Patient     Patient Name: Vadim Mckeon  : 1955   MRN: [id number]     Chief Complaint:   Chief Complaint   Patient presents with    Follow-up     Yearly med refill    Erectile Dysfunction     History of Present Illness: Mr. Vadim Mckeon is a 68 y.o. male who presents today for follow up with history of diabetes type II and ED.  Has been using Trimix and using a prescribed volume of 0.2 ml.  He has been having good success with ICI but admits to it being awkward at times.  Cialis was not successful in the past.  PSA is ordered annually by PCP and was normal.  BRIANDA score is 21.      Subjective      Review of System: ROS reviewed by me and is negative unless otherwise noted in HPI.      Past Medical History:   Past Medical History:   Diagnosis Date    Hypercholesterolemia     Hyperlipidemia     Hypertension     Movement disorder     APOLINAR (obstructive sleep apnea)     Parkinson's disease     Parkinsons     Sprained ankle     History of    Type II diabetes mellitus, uncontrolled      Past Surgical History:   Past Surgical History:   Procedure Laterality Date    RENE HOLE FOR INSERTION DBS LEADS      KNEE ARTHROSCOPY W/ MENISCAL REPAIR      PACEMAKER REPLACEMENT       Family History:   Family History   Problem Relation Age of Onset    Heart attack Father     Prostate cancer Father     Cancer Mother     Diabetes Maternal Aunt     Diabetes Maternal Uncle     Cancer Brother      Social History:   Social History     Socioeconomic History    Marital status:    Tobacco Use    Smoking status: Never     Passive exposure: Never    Smokeless tobacco: Never   Vaping Use    Vaping status: Never Used   Substance and Sexual Activity    Alcohol use: Yes     Comment: 2 drinks/month    Drug use: No    Sexual activity: Defer     Medications:     Current Outpatient Medications:     Armodafinil 250 MG tablet, Take 1 tablet by mouth Daily., Disp: 30 tablet, Rfl: 3     carbidopa-levodopa (SINEMET)  MG per tablet, 4 (Four) Times a Day., Disp: , Rfl:     citalopram (CeleXA) 40 MG tablet, Take 1 tablet by mouth Daily., Disp: , Rfl:     clonazePAM (KlonoPIN) 1 MG tablet, , Disp: , Rfl:     colestipol (COLESTID) 1 g tablet, Take 1 tablet by mouth 2 (Two) Times a Day., Disp: , Rfl:     Continuous Blood Gluc Sensor (FreeStyle Neda 2 Sensor) misc, USE ONE device every 14 DAYS, Disp: 6 each, Rfl: 3    cyanocobalamin 1000 MCG/ML injection, Inject 1 ml IM every 2 weeks for 2 doses, THEN ONCE monthly], Disp: , Rfl:     donepezil (ARICEPT) 23 MG tablet, Take 1 tablet by mouth every night at bedtime., Disp: , Rfl:     gabapentin (NEURONTIN) 300 MG capsule, Take 1 capsule by mouth Every Night., Disp: , Rfl:     hydrOXYzine (ATARAX) 25 MG tablet, , Disp: , Rfl:     Insulin Lispro, 1 Unit Dial, (HumaLOG KwikPen) 100 UNIT/ML solution pen-injector, Inject 8 Units under the skin into the appropriate area as directed 3 (Three) Times a Day Before Meals. (Patient taking differently: Inject 8 Units under the skin into the appropriate area as directed 3 (Three) Times a Day Before Meals. 8-12 UNITS TID BEFORE MEALS), Disp: 24 mL, Rfl: 3    Insulin Pen Needle (Easy Touch Pen Needles) 31G X 8 MM misc, Use to give insulin qid. Diagnoses code e11.65, Disp: 400 each, Rfl: 3    lisinopril-hydrochlorothiazide (PRINZIDE,ZESTORETIC) 20-25 MG per tablet, TAKE ONE TABLET BY MOUTH everyday, Disp: , Rfl:     loperamide (IMODIUM) 2 MG capsule, , Disp: , Rfl:     metFORMIN (GLUCOPHAGE) 1000 MG tablet, Take 1 tablet by mouth 2 (Two) Times a Day., Disp: , Rfl:     Nuedexta 20-10 MG capsule capsule, , Disp: , Rfl:     Omega-3 Fatty Acids (FISH OIL TRIPLE STRENGTH) 1400 MG capsule, Take 1 capsule by mouth daily., Disp: , Rfl:     omeprazole (priLOSEC) 40 MG capsule, , Disp: , Rfl:     ondansetron ODT (ZOFRAN-ODT) 4 MG disintegrating tablet, , Disp: , Rfl:     Pramipexole Dihydrochloride ER 1.5 MG tablet  "sustained-release 24 hour, Take 1.5 mg by mouth Daily., Disp: , Rfl:     ranitidine (ZANTAC) 150 MG tablet, Take 1 tablet by mouth 2 (Two) Times a Day., Disp: , Rfl:     rosuvastatin (CRESTOR) 20 MG tablet, TAKE ONE TABLET BY MOUTH ONCE DAILY AT BEDTIME, Disp: 30 tablet, Rfl: 2    tadalafil (CIALIS) 5 MG tablet, Take 1 tablet by mouth Daily., Disp: , Rfl:     triamcinolone (KENALOG) 0.5 % cream, apply TO TO RASH AREA TWO TO THREE times daily, Disp: , Rfl:     True Metrix Blood Glucose Test test strip, TEST BLOOD SUGAR TWICE A DAY, Disp: 100 each, Rfl: 11    vitamin D (ERGOCALCIFEROL) 88637 units capsule capsule, TAKE 1 CAPSULE BY MOUTH ONCE WEEKLY, Disp: 4 capsule, Rfl: 12    Insulin Glargine, 1 Unit Dial, (TOUJEO) 300 UNIT/ML solution pen-injector injection, Inject 50 Units under the skin into the appropriate area as directed Daily., Disp: 5 mL, Rfl: 5    Allergies:   No Known Allergies    Objective     Physical Exam:   Vital Signs:   Vitals:    06/05/24 0833 06/05/24 0839   BP: 138/84    BP Location: Left arm    Patient Position: Sitting    Cuff Size: Adult    Pulse: 53    Temp: 97.1 °F (36.2 °C)    TempSrc: Temporal    Weight: 95.4 kg (210 lb 6.4 oz)    Height: 180.3 cm (71\") 175.1 cm (68.94\")     Body mass index is 31.13 kg/m².   Physical Exam  Vitals and nursing note reviewed.   Constitutional:       General: He is not in acute distress.     Appearance: Normal appearance. He is not ill-appearing.   HENT:      Head: Normocephalic and atraumatic.   Pulmonary:      Effort: Pulmonary effort is normal.   Skin:     General: Skin is warm and dry.   Neurological:      Mental Status: He is alert and oriented to person, place, and time.      Gait: Gait abnormal (slow).   Psychiatric:         Mood and Affect: Mood normal.         Behavior: Behavior normal.     Labs  Brief Urine Lab Results  (Last result in the past 365 days)        Color   Clarity   Blood   Leuk Est   Nitrite   Protein   CREAT   Urine HCG        01/18/24 " 1152 Yellow   Clear   Negative   Negative   Negative                   Lab Results   Component Value Date    WBC 7.4 05/01/2024    HGB 12.2 (L) 05/01/2024    HCT 36.3 (L) 05/01/2024    MCV 90.8 05/01/2024     05/01/2024     Radiographic Studies  No Images in the past 120 days found.    I have reviewed the above labs and imaging.     Assessment / Plan      Assessment/Plan:  Mr. Vadim Mckeon is a 68 y.o. male who presents today for follow up with history of diabetes type II and ED.  Has been using Trimix and using a prescribed volume of 0.2 ml.  He has been having good success with ICI but admits to it being awkward at times.  Cialis was not successful in the past.  PSA is ordered annually by PCP and was normal.  Will continue Trimix at a dose of 0.2 ml.    Diagnoses and all orders for this visit:    1. Erectile dysfunction, unspecified erectile dysfunction type (Primary)    Plan:  1) continue Trimix 0.2 ml. Discussed what to do in the event that he has an erection lasting longer than 2 hours.    2) Follow up in 1 year.     Follow Up:   Return in about 1 year (around 6/5/2025) for Next scheduled follow up.    Kaila Gray APRN, MSN, FNP-C  [facility]

## 2024-07-03 ENCOUNTER — OFFICE VISIT (OUTPATIENT)
Age: 69
End: 2024-07-03
Payer: MEDICARE

## 2024-07-03 VITALS
BODY MASS INDEX: 30.81 KG/M2 | DIASTOLIC BLOOD PRESSURE: 68 MMHG | OXYGEN SATURATION: 98 % | WEIGHT: 208 LBS | HEIGHT: 69 IN | HEART RATE: 75 BPM | SYSTOLIC BLOOD PRESSURE: 132 MMHG

## 2024-07-03 DIAGNOSIS — E11.65 UNCONTROLLED TYPE 2 DIABETES MELLITUS WITH HYPERGLYCEMIA: Primary | ICD-10-CM

## 2024-07-03 LAB
EXPIRATION DATE: ABNORMAL
GLUCOSE BLDC GLUCOMTR-MCNC: 302 MG/DL (ref 70–130)
Lab: ABNORMAL

## 2024-07-03 PROCEDURE — 82947 ASSAY GLUCOSE BLOOD QUANT: CPT | Performed by: INTERNAL MEDICINE

## 2024-07-03 PROCEDURE — 1159F MED LIST DOCD IN RCRD: CPT | Performed by: INTERNAL MEDICINE

## 2024-07-03 PROCEDURE — 99213 OFFICE O/P EST LOW 20 MIN: CPT | Performed by: INTERNAL MEDICINE

## 2024-07-03 PROCEDURE — 1160F RVW MEDS BY RX/DR IN RCRD: CPT | Performed by: INTERNAL MEDICINE

## 2024-07-03 PROCEDURE — 3044F HG A1C LEVEL LT 7.0%: CPT | Performed by: INTERNAL MEDICINE

## 2024-07-03 RX ORDER — INSULIN LISPRO 100 [IU]/ML
20 INJECTION, SOLUTION INTRAVENOUS; SUBCUTANEOUS
Qty: 54 ML | Refills: 3 | Status: SHIPPED | OUTPATIENT
Start: 2024-07-03

## 2024-07-03 NOTE — PROGRESS NOTES
"     Office Note      Date: 2024  Patient Name: Vadim Mckeon  MRN: [id number]  : 1955    Chief Complaint   Patient presents with    Diabetes       History of Present Illness:   Vadim Mckeon is a 68 y.o. male who presents for Diabetes type 2.   Current RX basal bolus insulin and metformin     Bg checks are done:with deni   Hypoglycemia :rare       Last A1c:  Hemoglobin A1C   Date Value Ref Range Status   2024 7.6 (H) See comment % Final     Comment:     Comment:  Diagnosis of Diabetes: > or = 6.5%  Increased risk of diabetes (Prediabetes): 5.7-6.4%  Glycemic Control: Nonpregnant Adults: <7.0%                    Pregnant: <6.0%       Changes in health since last visit: notes leg swelling . Last eye exam up to date.    Subjective            Review of Systems:   Review of Systems   Cardiovascular:  Positive for leg swelling.       The following portions of the patient's history were reviewed and updated as appropriate: allergies, current medications, past family history, past medical history, past social history, past surgical history, and problem list.    Objective     Visit Vitals  /68 (BP Location: Left arm, Patient Position: Sitting, Cuff Size: Adult)   Pulse 75   Ht 175.3 cm (69\")   Wt 94.3 kg (208 lb)   SpO2 98%   BMI 30.72 kg/m²           Physical Exam:  Physical Exam  Vitals reviewed.   Constitutional:       Appearance: Normal appearance.   Musculoskeletal:      Right lower leg: Edema present.      Left lower leg: Edema present.   Neurological:      Mental Status: He is alert.   Psychiatric:         Mood and Affect: Mood normal.         Behavior: Behavior normal.         Thought Content: Thought content normal.         Judgment: Judgment normal.          Assessment / Plan      Assessment & Plan:  Problem List Items Addressed This Visit       Uncontrolled type 2 diabetes mellitus with hyperglycemia - Primary    Current Assessment & Plan      Deteriorated  A1c is up. " Data off of his deni show tht his blood sugars are high after meals  Will have him increase meal time insulin     He has leg edema but it does not appear to be heart failure, kidney or liver disease  I think it's from bad veins. He will discuss with dr ignacio          Relevant Medications    metFORMIN (GLUCOPHAGE) 1000 MG tablet    Insulin Lispro, 1 Unit Dial, (HumaLOG KwikPen) 100 UNIT/ML solution pen-injector    Other Relevant Orders    POC Glucose, Blood (Completed)         Electronically signed by : Matthew Bills MD  07/03/2024

## 2024-07-03 NOTE — ASSESSMENT & PLAN NOTE
Deteriorated  A1c is up. Data off of his deni show tht his blood sugars are high after meals  Will have him increase meal time insulin     He has leg edema but it does not appear to be heart failure, kidney or liver disease  I think it's from bad veins. He will discuss with dr ignacio

## 2024-07-12 RX ORDER — ROSUVASTATIN CALCIUM 20 MG/1
20 TABLET, COATED ORAL
Qty: 90 TABLET | Refills: 3 | Status: SHIPPED | OUTPATIENT
Start: 2024-07-12

## 2024-07-12 NOTE — TELEPHONE ENCOUNTER
Rx Refill Note  Requested Prescriptions     Pending Prescriptions Disp Refills    rosuvastatin (CRESTOR) 20 MG tablet [Pharmacy Med Name: ROSUVASTATIN CALCIUM 20 MG 20 Tablet] 30 tablet 2     Sig: TAKE ONE TABLET BY MOUTH ONCE DAILY AT BEDTIME          Last office visit with prescribing clinician: 7/3/2024     Next office visit with prescribing clinician: 1/6/2025         Yvonne Salcido MA  07/12/24, 13:35 EDT

## 2024-07-31 ENCOUNTER — HOSPITAL ENCOUNTER (OUTPATIENT)
Facility: HOSPITAL | Age: 69
Discharge: HOME | End: 2024-07-31
Payer: MEDICARE

## 2024-07-31 LAB
ALBUMIN SERPL-MCNC: 4.4 G/DL (ref 3.4–4.8)
ALBUMIN/GLOB SERPL: 1.8 {RATIO} (ref 0.8–2)
ALP SERPL-CCNC: 80 U/L (ref 25–100)
ALT SERPL-CCNC: 13 U/L (ref 4–36)
ANION GAP SERPL CALCULATED.3IONS-SCNC: 12 MMOL/L (ref 3–16)
AST SERPL-CCNC: 16 U/L (ref 8–33)
BASOPHILS # BLD: 0 K/UL (ref 0–0.1)
BASOPHILS NFR BLD: 0.5 %
BILIRUB SERPL-MCNC: 0.5 MG/DL (ref 0.3–1.2)
BUN SERPL-MCNC: 26 MG/DL (ref 6–20)
CALCIUM SERPL-MCNC: 8.7 MG/DL (ref 8.5–10.5)
CHLORIDE SERPL-SCNC: 101 MMOL/L (ref 98–107)
CHOLEST SERPL-MCNC: 69 MG/DL (ref 0–200)
CO2 SERPL-SCNC: 23 MMOL/L (ref 20–30)
CREAT SERPL-MCNC: 1.9 MG/DL (ref 0.4–1.2)
EOSINOPHIL # BLD: 0 K/UL (ref 0–0.4)
EOSINOPHIL NFR BLD: 0.5 %
ERYTHROCYTE [DISTWIDTH] IN BLOOD BY AUTOMATED COUNT: 13.4 % (ref 11–16)
FOLATE SERPL-MCNC: 10.22 NG/ML
GFR SERPLBLD CREATININE-BSD FMLA CKD-EPI: 38 ML/MIN/{1.73_M2}
GLOBULIN SER CALC-MCNC: 2.4 G/DL
GLUCOSE SERPL-MCNC: 200 MG/DL (ref 74–106)
HBA1C MFR BLD: 7.6 %
HCT VFR BLD AUTO: 34.4 % (ref 40–54)
HDLC SERPL-MCNC: 32 MG/DL (ref 40–60)
HGB BLD-MCNC: 11.4 G/DL (ref 13–18)
IMM GRANULOCYTES # BLD: 0 K/UL
IMM GRANULOCYTES NFR BLD: 0.3 % (ref 0–5)
LDLC SERPL CALC-MCNC: -4 MG/DL
LYMPHOCYTES # BLD: 1.3 K/UL (ref 1.5–4)
LYMPHOCYTES NFR BLD: 17.7 %
MCH RBC QN AUTO: 30.3 PG (ref 27–32)
MCHC RBC AUTO-ENTMCNC: 33.1 G/DL (ref 31–35)
MCV RBC AUTO: 91.5 FL (ref 80–100)
MONOCYTES # BLD: 0.4 K/UL (ref 0.2–0.8)
MONOCYTES NFR BLD: 5 %
NEUTROPHILS # BLD: 5.6 K/UL (ref 2–7.5)
NEUTS SEG NFR BLD: 76 %
PLATELET # BLD AUTO: 156 K/UL (ref 150–400)
PMV BLD AUTO: 11.1 FL (ref 6–10)
POTASSIUM SERPL-SCNC: 4.6 MMOL/L (ref 3.4–5.1)
PROT SERPL-MCNC: 6.8 G/DL (ref 6.4–8.3)
RBC # BLD AUTO: 3.76 M/UL (ref 4.5–6)
SODIUM SERPL-SCNC: 136 MMOL/L (ref 136–145)
TRIGL SERPL-MCNC: 203 MG/DL (ref 0–249)
TSH SERPL DL<=0.005 MIU/L-ACNC: 2.78 UIU/ML (ref 0.27–4.2)
VIT B12 SERPL-MCNC: 271 PG/ML (ref 211–911)
VLDLC SERPL CALC-MCNC: 41 MG/DL
WBC # BLD AUTO: 7.4 K/UL (ref 4–11)

## 2024-07-31 PROCEDURE — 80061 LIPID PANEL: CPT

## 2024-07-31 PROCEDURE — 85025 COMPLETE CBC W/AUTO DIFF WBC: CPT

## 2024-07-31 PROCEDURE — 82607 VITAMIN B-12: CPT

## 2024-07-31 PROCEDURE — 82746 ASSAY OF FOLIC ACID SERUM: CPT

## 2024-07-31 PROCEDURE — 36415 COLL VENOUS BLD VENIPUNCTURE: CPT

## 2024-07-31 PROCEDURE — 80053 COMPREHEN METABOLIC PANEL: CPT

## 2024-07-31 PROCEDURE — 84443 ASSAY THYROID STIM HORMONE: CPT

## 2024-07-31 PROCEDURE — 83036 HEMOGLOBIN GLYCOSYLATED A1C: CPT

## 2024-08-14 ENCOUNTER — HOSPITAL ENCOUNTER (OUTPATIENT)
Facility: HOSPITAL | Age: 69
Discharge: HOME | End: 2024-08-14
Payer: MEDICARE

## 2024-08-14 LAB
ALBUMIN SERPL-MCNC: 4.2 G/DL (ref 3.4–4.8)
ALBUMIN/GLOB SERPL: 1.8 {RATIO} (ref 0.8–2)
ALP SERPL-CCNC: 75 U/L (ref 25–100)
ALT SERPL-CCNC: 22 U/L (ref 4–36)
ANION GAP SERPL CALCULATED.3IONS-SCNC: 12 MMOL/L (ref 3–16)
AST SERPL-CCNC: 23 U/L (ref 8–33)
BASOPHILS # BLD: 0 K/UL (ref 0–0.1)
BASOPHILS NFR BLD: 0.5 %
BILIRUB SERPL-MCNC: 0.6 MG/DL (ref 0.3–1.2)
BUN SERPL-MCNC: 29 MG/DL (ref 6–20)
CALCIUM SERPL-MCNC: 8.6 MG/DL (ref 8.5–10.5)
CHLORIDE SERPL-SCNC: 103 MMOL/L (ref 98–107)
CO2 SERPL-SCNC: 24 MMOL/L (ref 20–30)
CREAT SERPL-MCNC: 1.6 MG/DL (ref 0.4–1.2)
EOSINOPHIL # BLD: 0 K/UL (ref 0–0.4)
EOSINOPHIL NFR BLD: 0.4 %
ERYTHROCYTE [DISTWIDTH] IN BLOOD BY AUTOMATED COUNT: 13.3 % (ref 11–16)
FERRITIN SERPL IA-MCNC: 68.4 NG/ML (ref 22–322)
FOLATE SERPL-MCNC: 9.37 NG/ML
GFR SERPLBLD CREATININE-BSD FMLA CKD-EPI: 47 ML/MIN/{1.73_M2}
GLOBULIN SER CALC-MCNC: 2.4 G/DL
GLUCOSE SERPL-MCNC: 145 MG/DL (ref 74–106)
HCT VFR BLD AUTO: 33.7 % (ref 40–54)
HGB BLD-MCNC: 11 G/DL (ref 13–18)
IMM GRANULOCYTES # BLD: 0 K/UL
IMM GRANULOCYTES NFR BLD: 0.4 % (ref 0–5)
LYMPHOCYTES # BLD: 1.6 K/UL (ref 1.5–4)
LYMPHOCYTES NFR BLD: 19.6 %
MCH RBC QN AUTO: 29.6 PG (ref 27–32)
MCHC RBC AUTO-ENTMCNC: 32.6 G/DL (ref 31–35)
MCV RBC AUTO: 90.6 FL (ref 80–100)
MONOCYTES # BLD: 0.5 K/UL (ref 0.2–0.8)
MONOCYTES NFR BLD: 6.5 %
NEUTROPHILS # BLD: 5.7 K/UL (ref 2–7.5)
NEUTS SEG NFR BLD: 72.6 %
PLATELET # BLD AUTO: 151 K/UL (ref 150–400)
PMV BLD AUTO: 10.5 FL (ref 6–10)
POTASSIUM SERPL-SCNC: 4.6 MMOL/L (ref 3.4–5.1)
PROT SERPL-MCNC: 6.6 G/DL (ref 6.4–8.3)
RBC # BLD AUTO: 3.72 M/UL (ref 4.5–6)
SODIUM SERPL-SCNC: 139 MMOL/L (ref 136–145)
TIBC SERPL-MCNC: 338 UG/DL (ref 250–450)
WBC # BLD AUTO: 7.9 K/UL (ref 4–11)

## 2024-08-14 PROCEDURE — 83550 IRON BINDING TEST: CPT

## 2024-08-14 PROCEDURE — 82728 ASSAY OF FERRITIN: CPT

## 2024-08-14 PROCEDURE — 80053 COMPREHEN METABOLIC PANEL: CPT

## 2024-08-14 PROCEDURE — 36415 COLL VENOUS BLD VENIPUNCTURE: CPT

## 2024-08-14 PROCEDURE — 82746 ASSAY OF FOLIC ACID SERUM: CPT

## 2024-08-14 PROCEDURE — 85025 COMPLETE CBC W/AUTO DIFF WBC: CPT

## 2024-09-27 ENCOUNTER — APPOINTMENT (OUTPATIENT)
Dept: GENERAL RADIOLOGY | Facility: HOSPITAL | Age: 69
End: 2024-09-27
Payer: MEDICARE

## 2024-09-27 ENCOUNTER — HOSPITAL ENCOUNTER (EMERGENCY)
Facility: HOSPITAL | Age: 69
Discharge: HOME | End: 2024-09-27
Attending: HOSPITALIST
Payer: MEDICARE

## 2024-09-27 VITALS
TEMPERATURE: 99.3 F | HEIGHT: 70 IN | BODY MASS INDEX: 28.63 KG/M2 | WEIGHT: 200 LBS | HEART RATE: 104 BPM | DIASTOLIC BLOOD PRESSURE: 60 MMHG | RESPIRATION RATE: 20 BRPM | OXYGEN SATURATION: 97 % | SYSTOLIC BLOOD PRESSURE: 123 MMHG

## 2024-09-27 DIAGNOSIS — R11.2 NAUSEA AND VOMITING, UNSPECIFIED VOMITING TYPE: Primary | ICD-10-CM

## 2024-09-27 LAB
ALBUMIN SERPL-MCNC: 4.5 G/DL (ref 3.4–4.8)
ALBUMIN/GLOB SERPL: 1.5 {RATIO} (ref 0.8–2)
ALP SERPL-CCNC: 93 U/L (ref 25–100)
ALT SERPL-CCNC: 12 U/L (ref 4–36)
ANION GAP SERPL CALCULATED.3IONS-SCNC: 17 MMOL/L (ref 3–16)
AST SERPL-CCNC: 23 U/L (ref 8–33)
BASOPHILS # BLD: 0 K/UL (ref 0–0.1)
BASOPHILS NFR BLD: 0.3 %
BILIRUB SERPL-MCNC: 0.6 MG/DL (ref 0.3–1.2)
BILIRUB UR QL STRIP.AUTO: ABNORMAL
BUN SERPL-MCNC: 14 MG/DL (ref 6–20)
CALCIUM SERPL-MCNC: 9.2 MG/DL (ref 8.5–10.5)
CHLORIDE SERPL-SCNC: 100 MMOL/L (ref 98–107)
CLARITY UR: CLEAR
CO2 SERPL-SCNC: 20 MMOL/L (ref 20–30)
COLOR UR: YELLOW
CREAT SERPL-MCNC: 1.5 MG/DL (ref 0.4–1.2)
EOSINOPHIL # BLD: 0 K/UL (ref 0–0.4)
EOSINOPHIL NFR BLD: 0.1 %
ERYTHROCYTE [DISTWIDTH] IN BLOOD BY AUTOMATED COUNT: 12.9 % (ref 11–16)
FLUAV AG NPH QL: NEGATIVE
FLUBV AG NPH QL: NEGATIVE
GFR SERPLBLD CREATININE-BSD FMLA CKD-EPI: 50 ML/MIN/{1.73_M2}
GLOBULIN SER CALC-MCNC: 3.1 G/DL
GLUCOSE SERPL-MCNC: 154 MG/DL (ref 74–106)
GLUCOSE UR STRIP.AUTO-MCNC: 100 MG/DL
HCT VFR BLD AUTO: 36.6 % (ref 40–54)
HGB BLD-MCNC: 12.1 G/DL (ref 13–18)
HGB UR QL STRIP.AUTO: NEGATIVE
IMM GRANULOCYTES # BLD: 0 K/UL
IMM GRANULOCYTES NFR BLD: 0.3 % (ref 0–5)
KETONES UR STRIP.AUTO-MCNC: ABNORMAL MG/DL
LACTATE BLDV-SCNC: 5.4 MMOL/L (ref 0.4–1.9)
LEUKOCYTE ESTERASE UR QL STRIP.AUTO: NEGATIVE
LYMPHOCYTES # BLD: 1.3 K/UL (ref 1.5–4)
LYMPHOCYTES NFR BLD: 19.5 %
MCH RBC QN AUTO: 30 PG (ref 27–32)
MCHC RBC AUTO-ENTMCNC: 33.1 G/DL (ref 31–35)
MCV RBC AUTO: 90.6 FL (ref 80–100)
MONOCYTES # BLD: 0.1 K/UL (ref 0.2–0.8)
MONOCYTES NFR BLD: 1.8 %
NEUTROPHILS # BLD: 5.2 K/UL (ref 2–7.5)
NEUTS SEG NFR BLD: 78 %
NITRITE UR QL STRIP.AUTO: NEGATIVE
PH UR STRIP.AUTO: 6 [PH] (ref 5–8)
PLATELET # BLD AUTO: 154 K/UL (ref 150–400)
PMV BLD AUTO: 11 FL (ref 6–10)
POTASSIUM SERPL-SCNC: 4.2 MMOL/L (ref 3.4–5.1)
PROT SERPL-MCNC: 7.6 G/DL (ref 6.4–8.3)
PROT UR STRIP.AUTO-MCNC: 100 MG/DL
RBC # BLD AUTO: 4.04 M/UL (ref 4.5–6)
RBC #/AREA URNS HPF: NORMAL /HPF (ref 0–4)
SARS-COV-2 RDRP RESP QL NAA+PROBE: NOT DETECTED
SODIUM SERPL-SCNC: 137 MMOL/L (ref 136–145)
SP GR UR STRIP.AUTO: 1.02 (ref 1–1.03)
UA COMPLETE W REFLEX CULTURE PNL UR: ABNORMAL
UA DIPSTICK W REFLEX MICRO PNL UR: YES
URN SPEC COLLECT METH UR: ABNORMAL
UROBILINOGEN UR STRIP-ACNC: 1 E.U./DL
WBC # BLD AUTO: 6.7 K/UL (ref 4–11)
WBC #/AREA URNS HPF: NORMAL /HPF (ref 0–5)

## 2024-09-27 PROCEDURE — 96374 THER/PROPH/DIAG INJ IV PUSH: CPT

## 2024-09-27 PROCEDURE — 83605 ASSAY OF LACTIC ACID: CPT

## 2024-09-27 PROCEDURE — 96361 HYDRATE IV INFUSION ADD-ON: CPT

## 2024-09-27 PROCEDURE — 87635 SARS-COV-2 COVID-19 AMP PRB: CPT

## 2024-09-27 PROCEDURE — 71045 X-RAY EXAM CHEST 1 VIEW: CPT

## 2024-09-27 PROCEDURE — 99285 EMERGENCY DEPT VISIT HI MDM: CPT

## 2024-09-27 PROCEDURE — 81001 URINALYSIS AUTO W/SCOPE: CPT

## 2024-09-27 PROCEDURE — 87040 BLOOD CULTURE FOR BACTERIA: CPT

## 2024-09-27 PROCEDURE — 2580000003 HC RX 258: Performed by: HOSPITALIST

## 2024-09-27 PROCEDURE — 87804 INFLUENZA ASSAY W/OPTIC: CPT

## 2024-09-27 PROCEDURE — 36415 COLL VENOUS BLD VENIPUNCTURE: CPT

## 2024-09-27 PROCEDURE — 80053 COMPREHEN METABOLIC PANEL: CPT

## 2024-09-27 PROCEDURE — 6360000002 HC RX W HCPCS

## 2024-09-27 PROCEDURE — 85025 COMPLETE CBC W/AUTO DIFF WBC: CPT

## 2024-09-27 RX ORDER — ONDANSETRON 2 MG/ML
INJECTION INTRAMUSCULAR; INTRAVENOUS
Status: COMPLETED
Start: 2024-09-27 | End: 2024-09-27

## 2024-09-27 RX ORDER — 0.9 % SODIUM CHLORIDE 0.9 %
30 INTRAVENOUS SOLUTION INTRAVENOUS ONCE
Status: COMPLETED | OUTPATIENT
Start: 2024-09-27 | End: 2024-09-27

## 2024-09-27 RX ORDER — ONDANSETRON 4 MG/1
4 TABLET, ORALLY DISINTEGRATING ORAL 3 TIMES DAILY PRN
Qty: 21 TABLET | Refills: 0 | Status: SHIPPED | OUTPATIENT
Start: 2024-09-27

## 2024-09-27 RX ORDER — ONDANSETRON 2 MG/ML
4 INJECTION INTRAMUSCULAR; INTRAVENOUS ONCE
Status: COMPLETED | OUTPATIENT
Start: 2024-09-27 | End: 2024-09-27

## 2024-09-27 RX ADMIN — ONDANSETRON 4 MG: 2 INJECTION INTRAMUSCULAR; INTRAVENOUS at 10:08

## 2024-09-27 RX ADMIN — SODIUM CHLORIDE 2721 ML: 9 INJECTION, SOLUTION INTRAVENOUS at 10:22

## 2024-09-27 RX ADMIN — ONDANSETRON 4 MG: 2 INJECTION, SOLUTION INTRAMUSCULAR; INTRAVENOUS at 10:08

## 2024-09-27 ASSESSMENT — PAIN - FUNCTIONAL ASSESSMENT: PAIN_FUNCTIONAL_ASSESSMENT: 0-10

## 2024-09-27 ASSESSMENT — LIFESTYLE VARIABLES: HOW OFTEN DO YOU HAVE A DRINK CONTAINING ALCOHOL: NEVER

## 2024-09-27 ASSESSMENT — PAIN SCALES - GENERAL: PAINLEVEL_OUTOF10: 0

## 2024-09-27 NOTE — ED PROVIDER NOTES
[facility]  EMERGENCY DEPARTMENT ENCOUNTER        Pt Name: Gareth Ly  MRN: [id number]  Birthdate 1955  Date of evaluation: 9/27/2024  Provider: Timmy Rivas DO  PCP: Kathya Castelan APRN - CNP  Note Started: 10:01 AM EDT 9/27/24    CHIEF COMPLAINT       Chief Complaint   Patient presents with    Emesis       HISTORY OF PRESENT ILLNESS: 1 or more Elements     History from : Patient    Limitations to history : None    Gareth Ly is a 68 y.o. male who presents to the emergency department for nausea vomiting and shortness of breath.  Patient states that he felt perfectly fine when he went to bed last night.  States he woke this morning was in the process of eating when he had sudden onset of nausea and vomiting.  Patient states he did not have any abdominal pain still does not have any abdominal pain with this.  States he had a bowel movement this morning which was normal.  He denied any dark tarry or bright red bloody stool.  Patient was also complaining of shortness of breath he does have a history of Parkinson's, hypertension and diabetes.  Patient used his medical alert bracelet to contact for EMS.  Upon arrival of EMS the patient was also complaining shortness of breath his pulse ox apparently was low they did not say exactly what it was but they placed him on 10 L nonrebreather.  Patient states he does not use oxygen at home does not have any lung disease that he is aware of.  Patient has a stimulator secondary to his Parkinson's disease which causes a lot of interference with the EKG and artifact.  However his pulse ox is 100% now on 2 L nasal cannula.  We initially tried him on room air but he did drop down to about 87% on the monitor.  Patient had episode of vomiting here.  He had IV established and was given Zofran 4 mg IV.  Patient denies headache with his symptoms.  States he does have an earache but denies any sore throat.  He does have nasal congestion and  drainage.  Positive for cough but nonproductive.  Denies chest pain upon arrival here was actually denying shortness of breath.  Denies abdominal pain but positive for nausea vomiting denies any diarrhea.  Denies dysuria.  He denies any body aches out of ordinary.  Denies any sensation of fevers or chills.  Denies any sick contacts.  States he is vaccinated for COVID and influenza.  Past medical history is pertinent for acute renal failure secondary to dehydration, diabetes, GERD, hyperlipidemia, Parkinson's and vertigo.  Patient does have deep brain stimulator.    Nursing Notes were all reviewed and agreed with or any disagreements were addressed in the HPI.    REVIEW OF SYSTEMS :      Review of Systems    Review of systems reviewed and negative except as in HPI/MDM    PAST MEDICAL HISTORY     Past Medical History:   Diagnosis Date    Acute renal failure (ARF) (HCC) 10/11/2023    Diabetes mellitus (HCC)     GERD (gastroesophageal reflux disease)     Hyperlipidemia     Parkinson disease (HCC)     Vertigo        SURGICAL HISTORY     Past Surgical History:   Procedure Laterality Date    BRAIN SURGERY  03/2018    DBS system     DEEP BRAIN STIMULATOR PLACEMENT      KNEE ARTHROPLASTY Left        CURRENTMEDICATIONS       Previous Medications    B-D UF III MINI PEN NEEDLES 31G X 5 MM MISC    Inject 1 each into the skin 4 times daily    CARBIDOPA-LEVODOPA (SINEMET)  MG PER TABLET    Take 1 tablet by mouth 5 times daily    CITALOPRAM (CELEXA) 40 MG TABLET    Take 1.5 tablets by mouth daily    CLONAZEPAM (KLONOPIN) 1 MG TABLET    Take 1.5 tablets by mouth nightly as needed for Anxiety.    COLESTIPOL (COLESTID) 1 G TABLET    Take 1 tablet by mouth 3 times daily (with meals)    CONTINUOUS BLOOD GLUC SENSOR (FREESTYLE LYNDON 2 SENSOR) [facility]    Apply 1 Device topically every 14 days    DEXTROMETHORPHAN-QUINIDINE (NUEDEXTA) 20-10 MG CAPS PER CAPSULE    Take 1 capsule by mouth 2 times daily    DONEPEZIL HCL (ARICEPT) 23 MG TABS

## 2024-09-27 NOTE — ED TRIAGE NOTES
Per ems pt woke this am with sob and vomiting.  He denies sick contact, denies pain.  Last bm was this am, it was normal per pt.

## 2024-09-27 NOTE — ED NOTES
Reviewed discharge plan with Gareth Ly.  Encouraged him to f/u with Kathya Castelan APRN - CNP and he understood.  NAD noted on discharge.  Reviewed discharge prescription for:     Current Discharge Medication List        START taking these medications    Details   ondansetron (ZOFRAN-ODT) 4 MG disintegrating tablet Take 1 tablet by mouth 3 times daily as needed for Nausea or Vomiting  Qty: 21 tablet, Refills: 0             Gareth states understanding of how and when to take medications.      Electronically signed by Kanchan Damian RN on 9/27/2024 at 1:21 PM

## 2024-09-28 LAB — BACTERIA BLD CULT: NORMAL

## 2024-10-01 LAB — BACTERIA BLD CULT: NORMAL

## 2024-10-14 NOTE — TELEPHONE ENCOUNTER
Rx Refill Note  Requested Prescriptions     Pending Prescriptions Disp Refills    Continuous Glucose Sensor (FreeStyle Neda 2 Sensor) misc 6 each 3          Last office visit with prescribing clinician: 7/3/2024     Next office visit with prescribing clinician: 1/6/2025         Yvonne Salcido MA  10/14/24, 14:42 EDT

## 2024-11-20 ENCOUNTER — HOSPITAL ENCOUNTER (OUTPATIENT)
Facility: HOSPITAL | Age: 69
Discharge: HOME | End: 2024-11-20
Payer: MEDICARE

## 2024-11-20 LAB
25(OH)D3 SERPL-MCNC: 63.8 NG/ML (ref 32–100)
ALBUMIN SERPL-MCNC: 4.3 G/DL (ref 3.4–4.8)
ALBUMIN/GLOB SERPL: 1.9 {RATIO} (ref 0.8–2)
ALP SERPL-CCNC: 75 U/L (ref 25–100)
ALT SERPL-CCNC: 7 U/L (ref 4–36)
ANION GAP SERPL CALCULATED.3IONS-SCNC: 13 MMOL/L (ref 3–16)
AST SERPL-CCNC: 22 U/L (ref 8–33)
BASOPHILS # BLD: 0 K/UL (ref 0–0.1)
BASOPHILS NFR BLD: 0.6 %
BILIRUB SERPL-MCNC: 0.4 MG/DL (ref 0.3–1.2)
BUN SERPL-MCNC: 18 MG/DL (ref 6–20)
CALCIUM SERPL-MCNC: 9.1 MG/DL (ref 8.5–10.5)
CHLORIDE SERPL-SCNC: 103 MMOL/L (ref 98–107)
CHOLEST SERPL-MCNC: 73 MG/DL (ref 0–200)
CO2 SERPL-SCNC: 24 MMOL/L (ref 20–30)
CREAT SERPL-MCNC: 1.5 MG/DL (ref 0.4–1.2)
EOSINOPHIL # BLD: 0 K/UL (ref 0–0.4)
EOSINOPHIL NFR BLD: 0.6 %
ERYTHROCYTE [DISTWIDTH] IN BLOOD BY AUTOMATED COUNT: 14 % (ref 11–16)
FOLATE SERPL-MCNC: 7.4 NG/ML
GFR SERPLBLD CREATININE-BSD FMLA CKD-EPI: 50 ML/MIN/{1.73_M2}
GLOBULIN SER CALC-MCNC: 2.3 G/DL
GLUCOSE SERPL-MCNC: 98 MG/DL (ref 74–106)
HBA1C MFR BLD: 8 %
HCT VFR BLD AUTO: 31.5 % (ref 40–54)
HDLC SERPL-MCNC: 32 MG/DL (ref 40–60)
HGB BLD-MCNC: 10.5 G/DL (ref 13–18)
IMM GRANULOCYTES # BLD: 0 K/UL
IMM GRANULOCYTES NFR BLD: 0.4 % (ref 0–5)
LDLC SERPL CALC-MCNC: 14 MG/DL
LYMPHOCYTES # BLD: 1.5 K/UL (ref 1.5–4)
LYMPHOCYTES NFR BLD: 21.8 %
MCH RBC QN AUTO: 29 PG (ref 27–32)
MCHC RBC AUTO-ENTMCNC: 33.3 G/DL (ref 31–35)
MCV RBC AUTO: 87 FL (ref 80–100)
MONOCYTES # BLD: 0.4 K/UL (ref 0.2–0.8)
MONOCYTES NFR BLD: 6.3 %
NEUTROPHILS # BLD: 4.8 K/UL (ref 2–7.5)
NEUTS SEG NFR BLD: 70.3 %
PLATELET # BLD AUTO: 188 K/UL (ref 150–400)
PMV BLD AUTO: 10.5 FL (ref 6–10)
POTASSIUM SERPL-SCNC: 4.3 MMOL/L (ref 3.4–5.1)
PROT SERPL-MCNC: 6.6 G/DL (ref 6.4–8.3)
PSA SERPL DL<=0.01 NG/ML-MCNC: 1.76 NG/ML (ref 0–4)
RBC # BLD AUTO: 3.62 M/UL (ref 4.5–6)
SODIUM SERPL-SCNC: 140 MMOL/L (ref 136–145)
TRIGL SERPL-MCNC: 137 MG/DL (ref 0–249)
TSH SERPL DL<=0.005 MIU/L-ACNC: 2.43 UIU/ML (ref 0.27–4.2)
VIT B12 SERPL-MCNC: 219 PG/ML (ref 211–911)
VLDLC SERPL CALC-MCNC: 27 MG/DL
WBC # BLD AUTO: 6.9 K/UL (ref 4–11)

## 2024-11-20 PROCEDURE — 85025 COMPLETE CBC W/AUTO DIFF WBC: CPT

## 2024-11-20 PROCEDURE — 83036 HEMOGLOBIN GLYCOSYLATED A1C: CPT

## 2024-11-20 PROCEDURE — 80061 LIPID PANEL: CPT

## 2024-11-20 PROCEDURE — 82607 VITAMIN B-12: CPT

## 2024-11-20 PROCEDURE — 82306 VITAMIN D 25 HYDROXY: CPT

## 2024-11-20 PROCEDURE — 84443 ASSAY THYROID STIM HORMONE: CPT

## 2024-11-20 PROCEDURE — 82746 ASSAY OF FOLIC ACID SERUM: CPT

## 2024-11-20 PROCEDURE — 36415 COLL VENOUS BLD VENIPUNCTURE: CPT

## 2024-11-20 PROCEDURE — 80053 COMPREHEN METABOLIC PANEL: CPT

## 2024-11-20 PROCEDURE — 84153 ASSAY OF PSA TOTAL: CPT

## 2024-12-02 ENCOUNTER — OFFICE VISIT (OUTPATIENT)
Dept: CARDIOLOGY | Facility: CLINIC | Age: 69
End: 2024-12-02
Payer: MEDICARE

## 2024-12-02 VITALS
BODY MASS INDEX: 31.58 KG/M2 | DIASTOLIC BLOOD PRESSURE: 64 MMHG | SYSTOLIC BLOOD PRESSURE: 106 MMHG | HEIGHT: 69 IN | OXYGEN SATURATION: 100 % | HEART RATE: 72 BPM | WEIGHT: 213.2 LBS

## 2024-12-02 DIAGNOSIS — E11.65 UNCONTROLLED TYPE 2 DIABETES MELLITUS WITH HYPERGLYCEMIA: ICD-10-CM

## 2024-12-02 DIAGNOSIS — I10 PRIMARY HYPERTENSION: Primary | ICD-10-CM

## 2024-12-02 DIAGNOSIS — E78.00 PURE HYPERCHOLESTEROLEMIA: ICD-10-CM

## 2024-12-02 PROCEDURE — 99213 OFFICE O/P EST LOW 20 MIN: CPT | Performed by: INTERNAL MEDICINE

## 2024-12-02 RX ORDER — LIFITEGRAST 50 MG/ML
SOLUTION/ DROPS OPHTHALMIC
COMMUNITY
Start: 2024-10-23

## 2024-12-02 RX ORDER — ALBUTEROL SULFATE 90 UG/1
INHALANT RESPIRATORY (INHALATION)
COMMUNITY
Start: 2024-10-17

## 2024-12-02 NOTE — PROGRESS NOTES
[facility] Cardiology Office Follow Up Note    Vadim Mckeon  [id number]  2024    Primary Care Provider: Zuleyma Blackburn APRN    Chief Complaint: Routine follow-up    History of Present Illness:   Mr. Vadim Mckeon is a 69y.o. male who presents to the Cardiology Clinic for routine follow-up. The patient has a past medical history significant for type 2 diabetes mellitus, hypertension, lipidemia, dementia, and Parkinson's disease.  He does not have any significant past cardiac history.  He returns today for routine follow-up.  The patient reports he has been doing well from a cardiac standpoint.  He has not had any significant episodes of chest pain or exertional chest discomfort.  His blood pressure appears to be well-controlled with his current medications.  No specific complaints today.     Past Cardiac Testin. Last Coronary Angio: None  2. Prior Stress Testing: None  3. Last Echo:  2021              1.  Normal left ventricular Luis Manuel function, LVEF 65-70%              2.  Grade 1 diastolic dysfunction              3.  Mild concentric LVH  4. Prior Holter Monitor: None    Review of Systems:   Review of Systems   Constitutional:  Negative for activity change, appetite change, chills, diaphoresis, fatigue, fever, unexpected weight gain and unexpected weight loss.   Eyes:  Negative for blurred vision and double vision.   Respiratory:  Negative for cough, chest tightness, shortness of breath and wheezing.    Cardiovascular:  Negative for chest pain, palpitations and leg swelling.   Gastrointestinal:  Negative for abdominal pain, anal bleeding, blood in stool and GERD.   Endocrine: Negative for cold intolerance and heat intolerance.   Genitourinary:  Negative for hematuria.   Neurological:  Negative for dizziness, syncope, weakness and light-headedness.   Hematological:  Does not bruise/bleed easily.   Psychiatric/Behavioral:  Negative for depressed mood and  stress. The patient is not nervous/anxious.        I have reviewed and/or updated the patient's past medical, past surgical, family, social history, problem list and allergies as appropriate.     Medications:     Current Outpatient Medications:     albuterol sulfate  (90 Base) MCG/ACT inhaler, , Disp: , Rfl:     carbidopa-levodopa (SINEMET)  MG per tablet, 4 (Four) Times a Day., Disp: , Rfl:     citalopram (CeleXA) 40 MG tablet, Take 1 tablet by mouth Daily., Disp: , Rfl:     clonazePAM (KlonoPIN) 1 MG tablet, , Disp: , Rfl:     colestipol (COLESTID) 1 g tablet, Take 1 tablet by mouth 2 (Two) Times a Day., Disp: , Rfl:     Continuous Glucose Sensor (FreeStyle Neda 2 Sensor) misc, Use 1 each Every 14 (Fourteen) Days., Disp: 6 each, Rfl: 3    cyanocobalamin 1000 MCG/ML injection, Inject 1 ml IM every 2 weeks for 2 doses, THEN ONCE monthly], Disp: , Rfl:     donepezil (ARICEPT) 23 MG tablet, Take 1 tablet by mouth every night at bedtime., Disp: , Rfl:     gabapentin (NEURONTIN) 300 MG capsule, Take 1 capsule by mouth Every Night., Disp: , Rfl:     hydrOXYzine (ATARAX) 25 MG tablet, , Disp: , Rfl:     Insulin Lispro, 1 Unit Dial, (HumaLOG KwikPen) 100 UNIT/ML solution pen-injector, Inject 20 Units under the skin into the appropriate area as directed 3 (Three) Times a Day Before Meals., Disp: 54 mL, Rfl: 3    Insulin Pen Needle (Easy Touch Pen Needles) 31G X 8 MM misc, Use to give insulin qid. Diagnoses code e11.65, Disp: 400 each, Rfl: 3    lisinopril-hydrochlorothiazide (PRINZIDE,ZESTORETIC) 20-25 MG per tablet, TAKE ONE TABLET BY MOUTH everyday, Disp: , Rfl:     metFORMIN (GLUCOPHAGE) 1000 MG tablet, Take 1 tablet by mouth 2 (Two) Times a Day., Disp: , Rfl:     Nuedexta 20-10 MG capsule capsule, , Disp: , Rfl:     Omega-3 Fatty Acids (FISH OIL TRIPLE STRENGTH) 1400 MG capsule, Take 1 capsule by mouth daily., Disp: , Rfl:     Pramipexole Dihydrochloride ER 1.5 MG tablet sustained-release 24 hour, Take 1.5  "mg by mouth Daily., Disp: , Rfl:     rosuvastatin (CRESTOR) 20 MG tablet, TAKE ONE TABLET BY MOUTH ONCE DAILY AT BEDTIME, Disp: 90 tablet, Rfl: 3    True Metrix Blood Glucose Test test strip, TEST BLOOD SUGAR TWICE A DAY, Disp: 100 each, Rfl: 11    vitamin D (ERGOCALCIFEROL) 58515 units capsule capsule, TAKE 1 CAPSULE BY MOUTH ONCE WEEKLY, Disp: 4 capsule, Rfl: 12    Xiidra 5 % ophthalmic solution, , Disp: , Rfl:     Armodafinil 250 MG tablet, Take 1 tablet by mouth Daily. (Patient not taking: Reported on 12/2/2024), Disp: 30 tablet, Rfl: 3    Insulin Glargine, 1 Unit Dial, (TOUJEO) 300 UNIT/ML solution pen-injector injection, Inject 50 Units under the skin into the appropriate area as directed Daily., Disp: 5 mL, Rfl: 5    loperamide (IMODIUM) 2 MG capsule, , Disp: , Rfl:     omeprazole (priLOSEC) 40 MG capsule, , Disp: , Rfl:     ondansetron ODT (ZOFRAN-ODT) 4 MG disintegrating tablet, , Disp: , Rfl:     ranitidine (ZANTAC) 150 MG tablet, Take 1 tablet by mouth 2 (Two) Times a Day. (Patient not taking: Reported on 12/2/2024), Disp: , Rfl:     tadalafil (CIALIS) 5 MG tablet, Take 1 tablet by mouth Daily. (Patient not taking: Reported on 12/2/2024), Disp: , Rfl:     triamcinolone (KENALOG) 0.5 % cream, apply TO TO RASH AREA TWO TO THREE times daily (Patient not taking: Reported on 12/2/2024), Disp: , Rfl:     Physical Exam:  Vital Signs:   Vitals:    12/02/24 0949   BP: 106/64   BP Location: Right arm   Patient Position: Sitting   Cuff Size: Adult   Pulse: 72   SpO2: 100%   Weight: 96.7 kg (213 lb 3.2 oz)   Height: 175.3 cm (69\")       Physical Exam  Constitutional:       General: He is not in acute distress.     Appearance: Normal appearance. He is not diaphoretic.   HENT:      Head: Normocephalic and atraumatic.   Cardiovascular:      Rate and Rhythm: Normal rate and regular rhythm.      Heart sounds: No murmur heard.  Pulmonary:      Effort: Pulmonary effort is normal. No respiratory distress.      Breath sounds: " Normal breath sounds. No stridor. No wheezing, rhonchi or rales.   Abdominal:      General: Bowel sounds are normal. There is no distension.      Palpations: Abdomen is soft.      Tenderness: There is no abdominal tenderness. There is no guarding or rebound.   Musculoskeletal:         General: No swelling. Normal range of motion.      Cervical back: Neck supple. No tenderness.   Skin:     General: Skin is warm and dry.   Neurological:      General: No focal deficit present.      Mental Status: He is alert and oriented to person, place, and time.   Psychiatric:         Mood and Affect: Mood normal.         Behavior: Behavior normal.         Results Review:   I reviewed the patient's new clinical results.        Assessment / Plan:     1. Essential hypertension  -- BP continues to be well-controlled  --Continue current antihypertensives     2.  Hyperlipidemia  -- Last lipid profile in 11/24 showed LDL 14, HDL 32, triglycerides 137  --Continue statin     3. Type 2 diabetes mellitus  -- Last hemoglobin A1c 8.0% in 11/24  --Management per PCP     4.  Parkinson's disease  --Follows with neurology    Follow Up:   Return in about 1 year (around 12/2/2025).      Thank you for allowing me to participate in the care of your patient. Please to not hesitate to contact me with additional questions or concerns.     GUTIERREZ Levy MD  Interventional Cardiology   12/02/2024  10:01 EST

## 2025-01-21 RX ORDER — FLURBIPROFEN SODIUM 0.3 MG/ML
1 SOLUTION/ DROPS OPHTHALMIC 4 TIMES DAILY
Qty: 200 EACH | Refills: 11 | Status: SHIPPED | OUTPATIENT
Start: 2025-01-21

## 2025-01-21 NOTE — TELEPHONE ENCOUNTER
Rx Refill Note  Requested Prescriptions     Pending Prescriptions Disp Refills    B-D UF III MINI PEN NEEDLES 31G X 5 MM misc [Pharmacy Med Name: BD UF III MINI PEN NEEDLE 3 31G X 5 MM Miscellaneous] 200 each 11     Sig: INJECT 4 TIMES DAILY      Last office visit with prescribing clinician: 7/3/2024      Next office visit with prescribing clinician: 4/18/2025       Danna Whitaker MA  01/21/25, 13:45 EST

## 2025-02-10 ENCOUNTER — TELEPHONE (OUTPATIENT)
Dept: ENDOCRINOLOGY | Facility: CLINIC | Age: 70
End: 2025-02-10
Payer: MEDICARE

## 2025-02-10 NOTE — TELEPHONE ENCOUNTER
Patient called stating his insulin is almost $100 dollars now, when he was getting it free. Advised he should contact his insurance and inquire, that he may have no met his deductible yet. He voiced understanding and will let us know.

## 2025-03-04 ENCOUNTER — HOSPITAL ENCOUNTER (OUTPATIENT)
Facility: HOSPITAL | Age: 70
Discharge: HOME | End: 2025-03-04
Payer: MEDICARE

## 2025-03-04 LAB
25(OH)D3 SERPL-MCNC: 55.4 NG/ML (ref 32–100)
ALBUMIN SERPL-MCNC: 4.1 G/DL (ref 3.4–4.8)
ALBUMIN/GLOB SERPL: 1.6 {RATIO} (ref 0.8–2)
ALP SERPL-CCNC: 80 U/L (ref 25–100)
ALT SERPL-CCNC: 12 U/L (ref 4–36)
ANION GAP SERPL CALCULATED.3IONS-SCNC: 11 MMOL/L (ref 3–16)
AST SERPL-CCNC: 24 U/L (ref 8–33)
BASOPHILS # BLD: 0 K/UL (ref 0–0.1)
BASOPHILS NFR BLD: 0.7 %
BILIRUB SERPL-MCNC: 0.4 MG/DL (ref 0.3–1.2)
BUN SERPL-MCNC: 23 MG/DL (ref 6–20)
CALCIUM SERPL-MCNC: 9 MG/DL (ref 8.3–10.6)
CHLORIDE SERPL-SCNC: 104 MMOL/L (ref 98–107)
CHOLEST SERPL-MCNC: 76 MG/DL (ref 0–200)
CO2 SERPL-SCNC: 26 MMOL/L (ref 20–30)
CREAT SERPL-MCNC: 1.6 MG/DL (ref 0.4–1.2)
EOSINOPHIL # BLD: 0.1 K/UL (ref 0–0.4)
EOSINOPHIL NFR BLD: 1.1 %
ERYTHROCYTE [DISTWIDTH] IN BLOOD BY AUTOMATED COUNT: 13.8 % (ref 11–16)
FOLATE SERPL-MCNC: 8.02 NG/ML
GFR SERPLBLD CREATININE-BSD FMLA CKD-EPI: 47 ML/MIN/{1.73_M2}
GLOBULIN SER CALC-MCNC: 2.6 G/DL
GLUCOSE SERPL-MCNC: 105 MG/DL (ref 74–106)
HBA1C MFR BLD: 7.5 %
HCT VFR BLD AUTO: 33 % (ref 40–54)
HDLC SERPL-MCNC: 31 MG/DL (ref 40–60)
HGB BLD-MCNC: 10.8 G/DL (ref 13–18)
IMM GRANULOCYTES # BLD: 0 K/UL
IMM GRANULOCYTES NFR BLD: 0.3 % (ref 0–5)
LDLC SERPL CALC-MCNC: 12 MG/DL
LYMPHOCYTES # BLD: 1 K/UL (ref 1.5–4)
LYMPHOCYTES NFR BLD: 16.7 %
MCH RBC QN AUTO: 29.3 PG (ref 27–32)
MCHC RBC AUTO-ENTMCNC: 32.7 G/DL (ref 31–35)
MCV RBC AUTO: 89.7 FL (ref 80–100)
MONOCYTES # BLD: 0.5 K/UL (ref 0.2–0.8)
MONOCYTES NFR BLD: 8 %
NEUTROPHILS # BLD: 4.5 K/UL (ref 2–7.5)
NEUTS SEG NFR BLD: 73.2 %
PLATELET # BLD AUTO: 154 K/UL (ref 150–400)
PMV BLD AUTO: 10.6 FL (ref 6–10)
POTASSIUM SERPL-SCNC: 4.1 MMOL/L (ref 3.4–5.1)
PROT SERPL-MCNC: 6.7 G/DL (ref 6.4–8.3)
RBC # BLD AUTO: 3.68 M/UL (ref 4.5–6)
SODIUM SERPL-SCNC: 141 MMOL/L (ref 136–145)
TRIGL SERPL-MCNC: 164 MG/DL (ref 0–249)
TSH SERPL DL<=0.005 MIU/L-ACNC: 2.42 UIU/ML (ref 0.27–4.2)
VIT B12 SERPL-MCNC: 202 PG/ML (ref 211–911)
VLDLC SERPL CALC-MCNC: 33 MG/DL
WBC # BLD AUTO: 6.1 K/UL (ref 4–11)

## 2025-03-04 PROCEDURE — 36415 COLL VENOUS BLD VENIPUNCTURE: CPT

## 2025-03-04 PROCEDURE — 82306 VITAMIN D 25 HYDROXY: CPT

## 2025-03-04 PROCEDURE — 82607 VITAMIN B-12: CPT

## 2025-03-04 PROCEDURE — 85025 COMPLETE CBC W/AUTO DIFF WBC: CPT

## 2025-03-04 PROCEDURE — 80053 COMPREHEN METABOLIC PANEL: CPT

## 2025-03-04 PROCEDURE — 82746 ASSAY OF FOLIC ACID SERUM: CPT

## 2025-03-04 PROCEDURE — 84443 ASSAY THYROID STIM HORMONE: CPT

## 2025-03-04 PROCEDURE — 83036 HEMOGLOBIN GLYCOSYLATED A1C: CPT

## 2025-03-04 PROCEDURE — 80061 LIPID PANEL: CPT

## 2025-04-18 ENCOUNTER — OFFICE VISIT (OUTPATIENT)
Age: 70
End: 2025-04-18
Payer: MEDICARE

## 2025-04-18 VITALS
OXYGEN SATURATION: 97 % | WEIGHT: 210.9 LBS | HEART RATE: 90 BPM | SYSTOLIC BLOOD PRESSURE: 133 MMHG | DIASTOLIC BLOOD PRESSURE: 70 MMHG | HEIGHT: 69 IN | BODY MASS INDEX: 31.24 KG/M2

## 2025-04-18 DIAGNOSIS — E11.65 UNCONTROLLED TYPE 2 DIABETES MELLITUS WITH HYPERGLYCEMIA: Primary | ICD-10-CM

## 2025-04-18 PROCEDURE — 99214 OFFICE O/P EST MOD 30 MIN: CPT | Performed by: INTERNAL MEDICINE

## 2025-04-18 PROCEDURE — 1159F MED LIST DOCD IN RCRD: CPT | Performed by: INTERNAL MEDICINE

## 2025-04-18 PROCEDURE — 1160F RVW MEDS BY RX/DR IN RCRD: CPT | Performed by: INTERNAL MEDICINE

## 2025-04-18 NOTE — PROGRESS NOTES
"     Office Note      Date: 2025  Patient Name: Vadim Mckeon  MRN: [id number]  : 1955    Chief Complaint   Patient presents with    Uncontrolled type 2 diabetes mellitus with hyperglycemia       History of Present Illness:   Vadim Mckeon is a 69 y.o. male who presents for Diabetes type 2.   Current RX TOUJEO AND HUMALOG AND METFORMIN     Bg checks are done:WITH JOSE   Hypoglycemia :OCCASIONAL NOCTURNAL       Last A1c:  Hemoglobin A1C   Date Value Ref Range Status   2025 7.5 (H) See comment % Final     Comment:     Comment:  Diagnosis of Diabetes: > or = 6.5%  Increased risk of diabetes (Prediabetes): 5.7-6.4%  Glycemic Control: Nonpregnant Adults: <7.0%                    Pregnant: <6.0%       Changes in health since last visit: HAS  MORE AND MORE TROUBLE WITH HIS FEET. FEET ARE MORE SWOLLEN.  LABS SHOWED HIGH URINE PROTIEN. CREATININE WAS 1.6  A1C WAS 7.5 .     Subjective            Review of Systems:   Review of Systems   Cardiovascular:  Positive for leg swelling.       The following portions of the patient's history were reviewed and updated as appropriate: allergies, current medications, past family history, past medical history, past social history, past surgical history, and problem list.    Objective     Visit Vitals  /70 (BP Location: Right arm, Patient Position: Sitting)   Pulse 90   Ht 175.3 cm (69\")   Wt 95.7 kg (210 lb 14.4 oz)   SpO2 97%   BMI 31.14 kg/m²           Physical Exam:  Physical Exam  Vitals reviewed.   Constitutional:       Appearance: Normal appearance.   Cardiovascular:      Comments: 1+ PRETIBIAL EDEMA   Feet:      Comments: HAMMER TOES   Neurological:      Mental Status: He is alert.   Psychiatric:         Mood and Affect: Mood normal.         Behavior: Behavior normal.         Thought Content: Thought content normal.         Judgment: Judgment normal.          Assessment / Plan      Assessment & Plan:  Problem List Items Addressed This Visit  "      Uncontrolled type 2 diabetes mellitus with hyperglycemia - Primary    Current Assessment & Plan   EYES- UP TO DATE  KIDNEYS- UP TO DATE. RENAL FUNCTION IS STABLE  FEET- CHECKED TODAY/ HE HAS NEUROPATHY AND IS ON GABAPENTIN THE BEST HE CAN DO FOR THAT IS CONTROL HIS DIABETES  WHICH IS BETTER BUT NOW TOO MANY LOWS AT NIGHT.   REDUCE  TOUJEO. TO 34 UNITS          Relevant Medications    metFORMIN (GLUCOPHAGE) 1000 MG tablet    Insulin Lispro, 1 Unit Dial, (HumaLOG KwikPen) 100 UNIT/ML solution pen-injector         Electronically signed by : Matthew Bills MD  04/18/2025

## 2025-04-18 NOTE — ASSESSMENT & PLAN NOTE
EYES- UP TO DATE  KIDNEYS- UP TO DATE. RENAL FUNCTION IS STABLE  FEET- CHECKED TODAY/ HE HAS NEUROPATHY AND IS ON GABAPENTIN THE BEST HE CAN DO FOR THAT IS CONTROL HIS DIABETES  WHICH IS BETTER BUT NOW TOO MANY LOWS AT NIGHT.   REDUCE  TOUJEO. TO 34 UNITS

## 2025-04-24 RX ORDER — ROSUVASTATIN CALCIUM 20 MG/1
20 TABLET, COATED ORAL
Qty: 90 TABLET | Refills: 2 | Status: SHIPPED | OUTPATIENT
Start: 2025-04-24

## 2025-04-24 NOTE — TELEPHONE ENCOUNTER
Rx Refill Note  Requested Prescriptions     Pending Prescriptions Disp Refills    rosuvastatin (CRESTOR) 20 MG tablet [Pharmacy Med Name: ROSUVASTATIN CALCIUM 20 MG 20 Tablet] 90 tablet 4     Sig: TAKE ONE TABLET BY MOUTH ONCE DAILY AT BEDTIME      Last office visit with prescribing clinician: Visit date not found     Next office visit with prescribing clinician: 11/3/2025    Izzy Sandoval MA  04/24/25, 11:06 EDT

## 2025-05-01 NOTE — TELEPHONE ENCOUNTER
RX refill request    Insulin Glargine, 1 Unit Dial, (TOUJEO) 300 UNIT/ML solution pen-injector injection ()         To send to:  [facility], [address] [phone] [facility] [phone]

## 2025-05-01 NOTE — TELEPHONE ENCOUNTER
Rx Refill Note  Requested Prescriptions     Pending Prescriptions Disp Refills    Insulin Glargine, 1 Unit Dial, (TOUJEO) 300 UNIT/ML solution pen-injector injection 5 mL 5     Sig: Inject 50 Units under the skin into the appropriate area as directed Daily for 180 days.      Last office visit with prescribing clinician: 4/18/2025      Next office visit with prescribing clinician: 11/3/2025       Danna Whitaker MA  05/01/25, 13:08 EDT

## 2025-06-04 NOTE — TELEPHONE ENCOUNTER
Rx Refill Note  Requested Prescriptions     Pending Prescriptions Disp Refills    Continuous Glucose Sensor (FreeStyle Neda 2 Sensor) misc [Pharmacy Med Name: FREESTYLE NEDA 2 SENSOR MI Miscellaneous] 6 each 4     Sig: USE 1 EACH EVERY 14 (FOURTEEN) DAYS.      Last office visit with prescribing clinician: 4/18/2025     Next office visit with prescribing clinician: 11/3/2025

## 2025-06-26 ENCOUNTER — HOSPITAL ENCOUNTER (OUTPATIENT)
Dept: ULTRASOUND IMAGING | Facility: HOSPITAL | Age: 70
Discharge: HOME | End: 2025-06-26
Payer: MEDICARE

## 2025-06-26 DIAGNOSIS — R22.1 NECK MASS: ICD-10-CM

## 2025-06-26 DIAGNOSIS — K11.3 ABSCESS OF SALIVARY GLAND: ICD-10-CM

## 2025-06-26 PROCEDURE — 76536 US EXAM OF HEAD AND NECK: CPT

## 2025-06-29 ENCOUNTER — APPOINTMENT (OUTPATIENT)
Dept: GENERAL RADIOLOGY | Facility: HOSPITAL | Age: 70
End: 2025-06-29
Attending: EMERGENCY MEDICINE
Payer: MEDICARE

## 2025-06-29 ENCOUNTER — HOSPITAL ENCOUNTER (EMERGENCY)
Facility: HOSPITAL | Age: 70
Discharge: HOME | End: 2025-06-29
Attending: EMERGENCY MEDICINE
Payer: MEDICARE

## 2025-06-29 VITALS
BODY MASS INDEX: 25.58 KG/M2 | TEMPERATURE: 97.8 F | SYSTOLIC BLOOD PRESSURE: 111 MMHG | HEART RATE: 87 BPM | RESPIRATION RATE: 22 BRPM | DIASTOLIC BLOOD PRESSURE: 69 MMHG | HEIGHT: 73 IN | OXYGEN SATURATION: 94 % | WEIGHT: 193 LBS

## 2025-06-29 DIAGNOSIS — S43.421A SPRAIN OF RIGHT ROTATOR CUFF CAPSULE, INITIAL ENCOUNTER: Primary | ICD-10-CM

## 2025-06-29 PROCEDURE — 6370000000 HC RX 637 (ALT 250 FOR IP): Performed by: EMERGENCY MEDICINE

## 2025-06-29 PROCEDURE — 29125 APPL SHORT ARM SPLINT STATIC: CPT

## 2025-06-29 PROCEDURE — 73070 X-RAY EXAM OF ELBOW: CPT

## 2025-06-29 PROCEDURE — 73030 X-RAY EXAM OF SHOULDER: CPT

## 2025-06-29 PROCEDURE — 99283 EMERGENCY DEPT VISIT LOW MDM: CPT

## 2025-06-29 RX ORDER — ACETAMINOPHEN 500 MG
1000 TABLET ORAL ONCE
Status: COMPLETED | OUTPATIENT
Start: 2025-06-29 | End: 2025-06-29

## 2025-06-29 RX ADMIN — ACETAMINOPHEN 1000 MG: 500 TABLET, FILM COATED ORAL at 21:18

## 2025-06-29 ASSESSMENT — PAIN - FUNCTIONAL ASSESSMENT: PAIN_FUNCTIONAL_ASSESSMENT: 0-10

## 2025-06-29 ASSESSMENT — LIFESTYLE VARIABLES
HOW OFTEN DO YOU HAVE A DRINK CONTAINING ALCOHOL: NEVER
HOW MANY STANDARD DRINKS CONTAINING ALCOHOL DO YOU HAVE ON A TYPICAL DAY: PATIENT DOES NOT DRINK

## 2025-06-29 ASSESSMENT — PAIN DESCRIPTION - PAIN TYPE: TYPE: ACUTE PAIN

## 2025-06-29 ASSESSMENT — PAIN DESCRIPTION - LOCATION: LOCATION: SHOULDER

## 2025-06-29 ASSESSMENT — PAIN SCALES - GENERAL: PAINLEVEL_OUTOF10: 9

## 2025-06-29 ASSESSMENT — PAIN DESCRIPTION - FREQUENCY: FREQUENCY: INTERMITTENT

## 2025-06-29 ASSESSMENT — PAIN DESCRIPTION - ORIENTATION: ORIENTATION: RIGHT

## 2025-06-29 NOTE — ED NOTES
Patient was getting up out of a chair and got feet got tangled up in the chair and patient fell on right elbow, patient with c/o right elbow and right shoulder pain. Patient with limited rom with shoulder and is able to extend right elbow without some pain.

## 2025-06-30 ASSESSMENT — ENCOUNTER SYMPTOMS
CHOKING: 0
RHINORRHEA: 0
DIARRHEA: 0
COUGH: 0
EYE REDNESS: 0
SHORTNESS OF BREATH: 0
SINUS PRESSURE: 0
WHEEZING: 0
TROUBLE SWALLOWING: 0
EYE ITCHING: 0
VOMITING: 0
CHEST TIGHTNESS: 0
SORE THROAT: 0
NAUSEA: 0
EYE PAIN: 0

## 2025-06-30 NOTE — DISCHARGE INSTRUCTIONS
Please apply an ice pack to the right shoulder area, keep the joint immobilized in the sling provided, take Tylenol OTC as needed for pain control, call the orthopedic surgeons office the morning and schedule follow-up appointment\.    If any new/acute symptoms or worsening of current presentation please go to the closest urgent care or emergency room for prompt reevaluation

## 2025-06-30 NOTE — ED PROVIDER NOTES
[facility]  EMERGENCY DEPARTMENT ENCOUNTER        Pt Name: Gareth Ly  MRN: [id number]  Birthdate 1955  Date of evaluation: 6/29/2025  Provider: Humberto Rodriguez MD  PCP: Kathya Castelan APRN - CNP  Note Started: 2:48 AM EDT 6/30/25    CHIEF COMPLAINT       Chief Complaint   Patient presents with    Shoulder Injury       HISTORY OF PRESENT ILLNESS: 1 or more Elements     History from : Patient    Limitations to history : None    Gareth Ly is a 69 y.o. male who presents to the emergency room via POV complaining of right shoulder pain, after fall sustained just an hour prior to arrival, while trying to stand up from sitting position and go to the bathroom.  Patient has a history of Parkinson's disease, he could not grab his walker quick enough resulting in fall.  Denies any head injury, neck pain or LOC.  No focal neurological deficit in the right upper extremity.  Patient is unable to raise his right upper extremity above the shoulder level.    Nursing Notes were all reviewed and agreed with or any disagreements were addressed in the HPI.    REVIEW OF SYSTEMS :      Review of Systems   Constitutional:  Negative for chills, diaphoresis, fatigue and fever.   HENT:  Negative for drooling, ear discharge, ear pain, postnasal drip, rhinorrhea, sinus pressure, sneezing, sore throat, tinnitus and trouble swallowing.    Eyes:  Negative for pain, redness and itching.   Respiratory:  Negative for cough, choking, chest tightness, shortness of breath and wheezing.    Cardiovascular:  Negative for chest pain.   Gastrointestinal:  Negative for diarrhea, nausea and vomiting.   Endocrine: Negative for polydipsia and polyphagia.   Genitourinary:  Negative for dysuria, frequency, genital sores, hematuria and urgency.   Musculoskeletal:  Positive for arthralgias (right shoulder + right elbow). Negative for gait problem.   Skin:  Negative for rash.   Neurological:  Negative for

## 2025-06-30 NOTE — ED NOTES
Dc instructions given to patient at this time, patient to follow up with ortho in the am and return for any other concerns.

## 2025-07-01 ENCOUNTER — TRANSCRIBE ORDERS (OUTPATIENT)
Dept: ADMINISTRATIVE | Facility: HOSPITAL | Age: 70
End: 2025-07-01
Payer: MEDICARE

## 2025-07-01 DIAGNOSIS — M75.121 COMPLETE TEAR OF RIGHT ROTATOR CUFF, UNSPECIFIED WHETHER TRAUMATIC: Primary | ICD-10-CM

## 2025-07-22 ENCOUNTER — TRANSCRIBE ORDERS (OUTPATIENT)
Dept: ADMINISTRATIVE | Facility: HOSPITAL | Age: 70
End: 2025-07-22
Payer: MEDICARE

## 2025-07-22 DIAGNOSIS — M75.121 NONTRAUMATIC COMPLETE TEAR OF RIGHT ROTATOR CUFF: Primary | ICD-10-CM
